# Patient Record
Sex: FEMALE | Race: WHITE | NOT HISPANIC OR LATINO | Employment: OTHER | URBAN - METROPOLITAN AREA
[De-identification: names, ages, dates, MRNs, and addresses within clinical notes are randomized per-mention and may not be internally consistent; named-entity substitution may affect disease eponyms.]

---

## 2023-05-25 ENCOUNTER — HOSPITAL ENCOUNTER (OUTPATIENT)
Dept: DATA CONVERSION | Facility: HOSPITAL | Age: 68
End: 2023-05-25
Attending: ANESTHESIOLOGY | Admitting: ANESTHESIOLOGY
Payer: COMMERCIAL

## 2023-05-25 DIAGNOSIS — Z79.52 LONG TERM (CURRENT) USE OF SYSTEMIC STEROIDS: ICD-10-CM

## 2023-05-25 DIAGNOSIS — M54.50 LOW BACK PAIN, UNSPECIFIED: ICD-10-CM

## 2023-05-25 DIAGNOSIS — Z91.040 LATEX ALLERGY STATUS: ICD-10-CM

## 2023-05-25 DIAGNOSIS — M54.16 RADICULOPATHY, LUMBAR REGION: ICD-10-CM

## 2023-09-07 VITALS — WEIGHT: 169.75 LBS | BODY MASS INDEX: 32.07 KG/M2

## 2023-09-21 ENCOUNTER — HOSPITAL ENCOUNTER (OUTPATIENT)
Dept: DATA CONVERSION | Facility: HOSPITAL | Age: 68
End: 2023-09-21
Attending: ANESTHESIOLOGY | Admitting: ANESTHESIOLOGY
Payer: COMMERCIAL

## 2023-09-21 DIAGNOSIS — M54.16 RADICULOPATHY, LUMBAR REGION: ICD-10-CM

## 2023-09-29 VITALS — WEIGHT: 169.75 LBS | BODY MASS INDEX: 32.07 KG/M2

## 2023-12-11 DIAGNOSIS — I10 PRIMARY HYPERTENSION: ICD-10-CM

## 2023-12-11 RX ORDER — CHLORTHALIDONE 25 MG/1
25 TABLET ORAL DAILY
Qty: 100 TABLET | Refills: 2 | Status: SHIPPED | OUTPATIENT
Start: 2023-12-11

## 2023-12-22 ENCOUNTER — OFFICE VISIT (OUTPATIENT)
Dept: PAIN MEDICINE | Facility: CLINIC | Age: 68
End: 2023-12-22
Payer: MEDICARE

## 2023-12-22 VITALS
HEART RATE: 66 BPM | WEIGHT: 169 LBS | TEMPERATURE: 97 F | HEIGHT: 61 IN | SYSTOLIC BLOOD PRESSURE: 148 MMHG | BODY MASS INDEX: 31.91 KG/M2 | DIASTOLIC BLOOD PRESSURE: 86 MMHG | RESPIRATION RATE: 15 BRPM

## 2023-12-22 DIAGNOSIS — M51.26 LUMBAR DISC HERNIATION: Primary | ICD-10-CM

## 2023-12-22 DIAGNOSIS — M48.061 LUMBAR FORAMINAL STENOSIS: ICD-10-CM

## 2023-12-22 DIAGNOSIS — M47.817 LUMBOSACRAL SPONDYLOSIS WITHOUT MYELOPATHY: ICD-10-CM

## 2023-12-22 DIAGNOSIS — M54.16 LUMBAR NEURITIS: ICD-10-CM

## 2023-12-22 PROCEDURE — 99214 OFFICE O/P EST MOD 30 MIN: CPT | Performed by: ANESTHESIOLOGY

## 2023-12-22 RX ORDER — TIZANIDINE 4 MG/1
TABLET ORAL 3 TIMES DAILY PRN
COMMUNITY

## 2023-12-22 RX ORDER — PREDNISONE 5 MG/1
5 TABLET ORAL
COMMUNITY
Start: 2023-12-06

## 2023-12-22 RX ORDER — DULOXETIN HYDROCHLORIDE 60 MG/1
60 CAPSULE, DELAYED RELEASE ORAL
COMMUNITY
Start: 2016-06-07 | End: 2024-09-05

## 2023-12-22 RX ORDER — DICLOFENAC SODIUM 50 MG/1
1 TABLET, DELAYED RELEASE ORAL 2 TIMES DAILY PRN
COMMUNITY
Start: 2023-12-06

## 2023-12-22 RX ORDER — ARIPIPRAZOLE 2 MG/1
1 TABLET ORAL
COMMUNITY
Start: 2022-05-02

## 2023-12-22 RX ORDER — PREDNISONE 1 MG/1
2 TABLET ORAL
COMMUNITY
Start: 2023-12-06

## 2023-12-22 RX ORDER — DULOXETIN HYDROCHLORIDE 30 MG/1
CAPSULE, DELAYED RELEASE ORAL
COMMUNITY
Start: 2018-04-10

## 2023-12-22 RX ORDER — BUPROPION HCL 300 MG
1 TABLET, EXTENDED RELEASE 24 HR ORAL DAILY
COMMUNITY
Start: 2023-03-27

## 2023-12-22 RX ORDER — GABAPENTIN 300 MG/1
300 CAPSULE ORAL 3 TIMES DAILY
COMMUNITY
Start: 2023-09-08

## 2023-12-22 RX ORDER — TRAZODONE HYDROCHLORIDE 50 MG/1
TABLET ORAL
COMMUNITY
Start: 2018-04-18

## 2023-12-22 SDOH — ECONOMIC STABILITY: FOOD INSECURITY: WITHIN THE PAST 12 MONTHS, THE FOOD YOU BOUGHT JUST DIDN'T LAST AND YOU DIDN'T HAVE MONEY TO GET MORE.: NEVER TRUE

## 2023-12-22 SDOH — ECONOMIC STABILITY: FOOD INSECURITY: WITHIN THE PAST 12 MONTHS, YOU WORRIED THAT YOUR FOOD WOULD RUN OUT BEFORE YOU GOT MONEY TO BUY MORE.: NEVER TRUE

## 2023-12-22 ASSESSMENT — COLUMBIA-SUICIDE SEVERITY RATING SCALE - C-SSRS
1. IN THE PAST MONTH, HAVE YOU WISHED YOU WERE DEAD OR WISHED YOU COULD GO TO SLEEP AND NOT WAKE UP?: NO
2. HAVE YOU ACTUALLY HAD ANY THOUGHTS OF KILLING YOURSELF?: NO
6. HAVE YOU EVER DONE ANYTHING, STARTED TO DO ANYTHING, OR PREPARED TO DO ANYTHING TO END YOUR LIFE?: NO

## 2023-12-22 ASSESSMENT — LIFESTYLE VARIABLES
HOW OFTEN DO YOU HAVE SIX OR MORE DRINKS ON ONE OCCASION: NEVER
AUDIT-C TOTAL SCORE: 0
SKIP TO QUESTIONS 9-10: 1
HOW MANY STANDARD DRINKS CONTAINING ALCOHOL DO YOU HAVE ON A TYPICAL DAY: PATIENT DOES NOT DRINK
HOW OFTEN DO YOU HAVE A DRINK CONTAINING ALCOHOL: NEVER

## 2023-12-22 ASSESSMENT — PATIENT HEALTH QUESTIONNAIRE - PHQ9
2. FEELING DOWN, DEPRESSED OR HOPELESS: SEVERAL DAYS
SUM OF ALL RESPONSES TO PHQ9 QUESTIONS 1 AND 2: 2
1. LITTLE INTEREST OR PLEASURE IN DOING THINGS: SEVERAL DAYS

## 2023-12-22 ASSESSMENT — PAIN SCALES - GENERAL: PAINLEVEL: 3

## 2023-12-22 NOTE — PROGRESS NOTES
History Of Present Illness  Jahaira Moore is a 68 y.o. female presenting with   Chief Complaint   Patient presents with    Back Pain       Patient who returns for chronic low back pain to the LEFT GROIN and LEFT HIP AREA. She continues to report improved chronic neck pain.     She also has low back pain that does RADIATE INTO HER LEFT buttock and GROIN AREA. The pain WAS in her leg and has since resolved with her injections. She reports her back pain got worse in December of 2020 after a busy holiday work schedule at [employer].      The pain is constant, worse standing or with head tilting activity and better with rest. The pain is sharp, nagging and shooting to the LUE. Denies LE paresthesias, weakness, saddle anesthesia, bowel or bladder incontinence. To manage this pain the patient has attempted over 6 weeks of physical therapy at [facility] in November/December of 2020.      The patients chronic depression on Cymbalta and Wellbutrin and Trazodone. She is on oral daily prednisone from her Rheumatologist.      PAIN SCORE: 3-8 /10.     PSHx  -Hysterectomy  -Right tympanic membrane repair  -Bilateral TKA     SocHx  -Retired occupation/ massotherapy  -Positive HX OF Tob 1 PPW QUIT AS OF DEC 2020.   -Occ EtoH 4-5 glasses of wine a week.      PCP: Dr. Rupesh Campbell   Ref: Neuro  Rheum: Dr. Edgard Patino / Dr. Haydee Wade        Past Medical History  She has a past medical history of Other specified disorders of adrenal gland (CMS/[facility]) (06/25/2018), Personal history of other diseases of the respiratory system, Personal history of other diseases of the respiratory system, Personal history of other infectious and parasitic diseases, Personal history of other mental and behavioral disorders, Personal history of other mental and behavioral disorders, Personal history of other specified conditions, Personal history of other specified conditions, Syncope and collapse, and Unspecified osteoarthritis, unspecified site.    Surgical  History  She has a past surgical history that includes Hysterectomy (11/28/2015); Other surgical history (11/28/2015); and Knee surgery (05/30/2018).     Social History  She reports that she has been smoking cigarettes. She has been smoking an average of .25 packs per day. She has never used smokeless tobacco. She reports that she does not drink alcohol and does not use drugs.    Family History  No family history on file.     Allergies  Latex    Review of Systems    All other systems reviewed and negative for any deficits. Pertinent positives and negatives were considered in the medical decision making process.        Physical Exam  /86   Pulse 66   Temp 36.1 °C (97 °F)   Resp 15   Wt 76.7 kg (169 lb)   General: Pt appears stated age     Eyes: Conjunctiva non-icteric and lids without obvious rash or drooping. Pupils are symmetric     ENT: External ears are without deformity or rash. Hearing is grossly intact     Neck: No JVD noted, tracheal position midline.      ---pain on tilting her head      Musculoskeletal: Gait is grossly normal     Digits/nails show no clubbing or cyanosis     Exam of muscles/joints/bones shows no gross atrophy and no abnormal/involuntary movements in the head/neckNo asymmetry or masses noted in the head/neck     Stability: no subluxation noted on movement of bilateral upper extremities or head/neck     Strength: 4+/5 in B/L upper extremities , 4/5 in bilateral LE      Range of Motion: WNL      Sensation: In tact      Cranial nerves 2-12 are grossly intact     Psychiatric: Pt is alert and oriented to time, place and person.       Assessment/Plan   1. Lumbar disc herniation        2. Lumbar neuritis        3. Lumbar foraminal stenosis        4. Lumbosacral spondylosis without myelopathy               Diagnoses/Problems     · Lumbosacral neuritis (724.4) (M54.17)   · Chronic low back pain (724.2,338.29) (M54.50,G89.29)   · Lumbar disc herniation (722.10) (M51.26)   · Lumbar neuritis  (724.4) (M54.16)   · Lumbar spondylosis (721.3) (M47.816)   · Lumbar stenosis (724.02) (M48.061)     Provider Impressions     1. I have previously provided the patient with a list of physical therapy exercises to learn and perform.     She does her stretches twice every day.      We also discussed leg lefts to maintain her muscle mass.      She has continued on over 6 weeks of home therapy exercises with no improvement in her low back pain. She has also failed over 6 weeks of Cymbalta and OTC NSAIDs including Voltaren and Tizanidine.      2. I again reviewed the patients MRI (2-4-2021) findings in detail, including review of the actual images and provided a detailed explanation of the findings using a spine model.      There are disc herniations at L3/4 and L4/5 with foraminal stenosis and a grade I anterolisthesis at both of these levels.      3. I previously reviewed the patients X-ray (2021) findings in detail, including review of the actual images and provided a detailed explanation of the findings using a spine model.      There is a grade I spondylolisthesis of L4 on L5, There is extensive degenerative changes at the L1/2 and L2/3 level. There is mild scoliosis centered at L3.      There was also incidental notation of a thoracic disc herniation at T11/12      4. I would recommend the pt CONTINUE on CYMBALTA to help with nerve related pain. We discussed the risks, benefits, and side effects to this medication including the mechanism of action and the pt understands and agrees.     5. I reviewed the patients cervical x-ray findings in detail, including review of the actual images and provided a detailed explanation of the findings using a spine model. There is grade I spondylolisthesis of the cervical spine at C3/4 and C5/6, multilevel cervical spondylosis and cervical foraminal stenosis.      Given the foraminal stenosis on x-ray I would recommend a Cervical MRI. There is spondylolisthesis and multilevel  degenerative changes. She has also failed PT and oral Cymbalta for over 6 weeks.      6. The patient is a candidate for an DIXON at C7/T1 versus Cervical facet to treat back and radicular pain. I spent time with the patient discussing all of the risks, benefits, and alternatives to this measure. Including but not limited to spinal infection, epidural hematoma/abscess, paralysis, nerve injury, steroid effects, and spinal headache. The patient understands and agrees to proceed as needed.      7. She quit tobacco as of December 2020 based on our advice.      8. The patient is a candidate for an LESI at L3/4 vs L5/S1 vs T11/12 TESI to treat back and radicular pain. I spent time with the patient discussing all of the risks, benefits, and alternatives to this measure. Including but not limited to spinal infection, epidural hematoma/abscess, paralysis, nerve injury, steroid effects, and spinal headache. The patient understands and agrees to proceed.     Her last lumbar injection was done on 9- (L5/S1 LESI BEST ONE YET)  5- and she reported 80% relief lasting for several weeks, however, her pain has since returned but is more towards her groin area. She reports she was able to stand and walk with less pain, however, since it has returned being active is very painful for her.      9. Recall: The pt did report left hip pain I did previously order bilateral hip joint x-rays done on 4-2-2022 and it showed mild bilateral OA.      I spent time with the patient reviewing their imaging and discussing the risks benefits and alternatives to the above plan. A total of 30 minutes was spent reviewing the data and greater than 50% of that time was with the patient during the face to face encounter discussing treatment options both surgical, non-surgical, and minimally invasive techniques.          Mario Odonnell MD

## 2024-02-05 ENCOUNTER — TELEPHONE (OUTPATIENT)
Dept: PAIN MEDICINE | Facility: CLINIC | Age: 69
End: 2024-02-05
Payer: COMMERCIAL

## 2024-02-05 DIAGNOSIS — M54.16 LUMBAR NEURITIS: Primary | ICD-10-CM

## 2024-02-07 ENCOUNTER — TELEPHONE (OUTPATIENT)
Dept: PAIN MEDICINE | Facility: CLINIC | Age: 69
End: 2024-02-07
Payer: COMMERCIAL

## 2024-02-12 ENCOUNTER — APPOINTMENT (OUTPATIENT)
Dept: PAIN MEDICINE | Facility: CLINIC | Age: 69
End: 2024-02-12
Payer: COMMERCIAL

## 2024-02-22 ENCOUNTER — HOSPITAL ENCOUNTER (OUTPATIENT)
Dept: RADIOLOGY | Facility: CLINIC | Age: 69
Discharge: HOME | End: 2024-02-22
Payer: MEDICARE

## 2024-02-22 ENCOUNTER — HOSPITAL ENCOUNTER (OUTPATIENT)
Dept: PAIN MEDICINE | Facility: CLINIC | Age: 69
Discharge: HOME | End: 2024-02-22
Payer: MEDICARE

## 2024-02-22 VITALS
DIASTOLIC BLOOD PRESSURE: 85 MMHG | SYSTOLIC BLOOD PRESSURE: 142 MMHG | RESPIRATION RATE: 18 BRPM | HEART RATE: 65 BPM | TEMPERATURE: 98.2 F | OXYGEN SATURATION: 96 %

## 2024-02-22 DIAGNOSIS — M54.16 LUMBAR NEURITIS: ICD-10-CM

## 2024-02-22 PROCEDURE — 62323 NJX INTERLAMINAR LMBR/SAC: CPT

## 2024-02-22 PROCEDURE — 2500000005 HC RX 250 GENERAL PHARMACY W/O HCPCS

## 2024-02-22 PROCEDURE — 2500000004 HC RX 250 GENERAL PHARMACY W/ HCPCS (ALT 636 FOR OP/ED)

## 2024-02-22 PROCEDURE — A4216 STERILE WATER/SALINE, 10 ML: HCPCS

## 2024-02-22 RX ORDER — SODIUM CHLORIDE 9 MG/ML
INJECTION, SOLUTION INTRAMUSCULAR; INTRAVENOUS; SUBCUTANEOUS
Status: COMPLETED
Start: 2024-02-22 | End: 2024-02-22

## 2024-02-22 RX ORDER — PREDNISONE 10 MG/1
10 TABLET ORAL DAILY
COMMUNITY

## 2024-02-22 RX ORDER — LIDOCAINE HYDROCHLORIDE 5 MG/ML
INJECTION, SOLUTION INFILTRATION; INTRAVENOUS
Status: COMPLETED
Start: 2024-02-22 | End: 2024-02-22

## 2024-02-22 RX ORDER — TRIAMCINOLONE ACETONIDE 40 MG/ML
INJECTION, SUSPENSION INTRA-ARTICULAR; INTRAMUSCULAR
Status: COMPLETED
Start: 2024-02-22 | End: 2024-02-22

## 2024-02-22 RX ADMIN — LIDOCAINE HYDROCHLORIDE 250 MG: 5 INJECTION, SOLUTION INFILTRATION at 16:03

## 2024-02-22 RX ADMIN — TRIAMCINOLONE ACETONIDE 40 MG: 40 INJECTION, SUSPENSION INTRA-ARTICULAR; INTRAMUSCULAR at 16:02

## 2024-02-22 RX ADMIN — SODIUM CHLORIDE 10 ML: 9 INJECTION, SOLUTION INTRAMUSCULAR; INTRAVENOUS; SUBCUTANEOUS at 16:02

## 2024-02-22 ASSESSMENT — ENCOUNTER SYMPTOMS
CHILLS: 0
NUMBNESS: 1
DIARRHEA: 0
WHEEZING: 0
CONSTIPATION: 0
SHORTNESS OF BREATH: 0
SEIZURES: 0
ARTHRALGIAS: 1
DIFFICULTY URINATING: 0
DIZZINESS: 0
DEPRESSION: 0
CHEST TIGHTNESS: 0
FEVER: 0
BLOOD IN STOOL: 0
EYE DISCHARGE: 0
LOSS OF SENSATION IN FEET: 0
WEAKNESS: 0
NECK STIFFNESS: 0
SPEECH DIFFICULTY: 0
VOMITING: 0
NECK PAIN: 0
COUGH: 0
BACK PAIN: 1
DIAPHORESIS: 0
OCCASIONAL FEELINGS OF UNSTEADINESS: 1
NAUSEA: 0

## 2024-02-22 ASSESSMENT — PATIENT HEALTH QUESTIONNAIRE - PHQ9
SUM OF ALL RESPONSES TO PHQ9 QUESTIONS 1 AND 2: 0
1. LITTLE INTEREST OR PLEASURE IN DOING THINGS: NOT AT ALL
2. FEELING DOWN, DEPRESSED OR HOPELESS: NOT AT ALL

## 2024-02-22 ASSESSMENT — PAIN SCALES - GENERAL
PAINLEVEL_OUTOF10: 0 - NO PAIN
PAINLEVEL_OUTOF10: 10 - WORST POSSIBLE PAIN

## 2024-02-22 ASSESSMENT — PAIN DESCRIPTION - DESCRIPTORS: DESCRIPTORS: ACHING;SORE;SHARP

## 2024-02-22 ASSESSMENT — PAIN - FUNCTIONAL ASSESSMENT: PAIN_FUNCTIONAL_ASSESSMENT: 0-10

## 2024-02-22 NOTE — H&P
History Of Present Illness  Jahaira Moore is a 68 y.o. female presenting with chronic low back pain.     Past Medical History  Past Medical History:   Diagnosis Date    Other specified disorders of adrenal gland (CMS/HCC) 06/25/2018    Adrenal nodule    Personal history of other diseases of the respiratory system     History of bronchitis    Personal history of other diseases of the respiratory system     History of pharyngitis    Personal history of other infectious and parasitic diseases     History of viral infection    Personal history of other mental and behavioral disorders     History of depression    Personal history of other mental and behavioral disorders     History of depression    Personal history of other specified conditions     History of diarrhea    Personal history of other specified conditions     History of abdominal pain    Syncope and collapse     Vasovagal syncope    Unspecified osteoarthritis, unspecified site     Osteoarthritis       Surgical History  Past Surgical History:   Procedure Laterality Date    HYSTERECTOMY  11/28/2015    Hysterectomy    KNEE SURGERY  05/30/2018    Knee Surgery    OTHER SURGICAL HISTORY  11/28/2015    Tympanic Membrane Repair        Social History  She reports that she has been smoking cigarettes. She has been smoking an average of .25 packs per day. She has never used smokeless tobacco. She reports that she does not drink alcohol and does not use drugs.    Family History  No family history on file.     Allergies  Latex    Review of Systems   Constitutional:  Negative for chills, diaphoresis and fever.   HENT:  Negative for ear discharge and tinnitus.    Eyes:  Negative for discharge.   Respiratory:  Negative for cough, chest tightness, shortness of breath and wheezing.    Cardiovascular:  Negative for chest pain.   Gastrointestinal:  Negative for blood in stool, constipation, diarrhea, nausea and vomiting.   Endocrine: Negative for polyuria.   Genitourinary:  Negative  for difficulty urinating.   Musculoskeletal:  Positive for arthralgias, back pain and gait problem. Negative for neck pain and neck stiffness.   Skin:  Negative for rash.   Neurological:  Positive for numbness. Negative for dizziness, seizures, speech difficulty and weakness.        Physical Exam  Constitutional:       Appearance: Normal appearance.   HENT:      Head: Normocephalic.      Nose: Nose normal.      Mouth/Throat:      Mouth: Mucous membranes are moist.      Pharynx: Oropharynx is clear.   Eyes:      Extraocular Movements: Extraocular movements intact.      Conjunctiva/sclera: Conjunctivae normal.      Pupils: Pupils are equal, round, and reactive to light.   Cardiovascular:      Rate and Rhythm: Normal rate.   Pulmonary:      Effort: Pulmonary effort is normal. No respiratory distress.      Breath sounds: No wheezing.   Chest:      Chest wall: No tenderness.   Abdominal:      Palpations: Abdomen is soft.   Musculoskeletal:      Cervical back: No rigidity.   Skin:     General: Skin is warm and dry.      Findings: No rash.   Neurological:      Mental Status: She is alert and oriented to person, place, and time.      Sensory: Sensory deficit present.      Motor: Weakness present.      Gait: Gait abnormal.   Psychiatric:         Mood and Affect: Mood normal.         Behavior: Behavior normal.          Last Recorded Vitals  Blood pressure 142/85, pulse 67, temperature 36.8 °C (98.2 °F), temperature source Temporal, resp. rate 16, SpO2 96 %.       Assessment/Plan   1. Lumbar neuritis  Epidural Steroid Injection    Epidural Steroid Injection           Mario Odonnell MD

## 2024-02-22 NOTE — OP NOTE
2/22/2024    Pre procedure Diagnosis: Lumbar neuritis  Post procedure Diagnosis: Lumbar neuritis    Procedure:     1. L5/S1 interlaminar epidural steroid injection   2. Fluoroscopic guidance     Complications: None    Assistants: None     EBL: None    PROCEDURE: The patient was identified in the preoperative area. After risks and benefits were explained, informed consent was obtained. The patient was brought back to the operating room and placed in the prone position on the operating table. Standard ASA monitors were applied and monitored throughout the procedure. Their vital signs remained stable throughout the procedure. The patient's lumbosacral spine was prepped and draped in usual sterile fashion. Using fluoroscopic guidance the skin overlying the trajectory to the L5/S1 space was anesthetized with a total of 5 ml of 0.5% Lidocaine. Thereafter, a 3.5 in long 20G Touhy needle was advanced through the anesthitized skin. Then, the needle was advanced into the L5/S1 ligamentum flavum under multiplanar fluoroscopic guidance.  Then using a loss of resistance syringe and technique the epidural space was accessed.  After negative aspiration for heme, or CSF a total of 4mL of Preservative Free Normal Saline and 40 mg of KENALOG was injected. The needle was removed and a sterile dressing was applied. The patient tolerated the procedure well and was transported to PACU in good condition.    PLAN: The pt will follow up in the office in one to two months to report their results with the procedure. Discharge instructions were reviewed in recovery and provided to the patient in writing. They were advised to call should they have any questions or concerns.

## 2024-05-22 ENCOUNTER — OFFICE VISIT (OUTPATIENT)
Dept: PAIN MEDICINE | Facility: CLINIC | Age: 69
End: 2024-05-22
Payer: MEDICARE

## 2024-05-22 VITALS
DIASTOLIC BLOOD PRESSURE: 86 MMHG | TEMPERATURE: 97.9 F | WEIGHT: 169 LBS | SYSTOLIC BLOOD PRESSURE: 130 MMHG | BODY MASS INDEX: 31.93 KG/M2 | HEART RATE: 76 BPM | OXYGEN SATURATION: 98 % | RESPIRATION RATE: 15 BRPM

## 2024-05-22 DIAGNOSIS — M54.50 CHRONIC LOW BACK PAIN, UNSPECIFIED BACK PAIN LATERALITY, UNSPECIFIED WHETHER SCIATICA PRESENT: ICD-10-CM

## 2024-05-22 DIAGNOSIS — M54.16 LUMBAR NEURITIS: Primary | ICD-10-CM

## 2024-05-22 DIAGNOSIS — M51.26 LUMBAR DISC HERNIATION: ICD-10-CM

## 2024-05-22 DIAGNOSIS — G89.29 CHRONIC LOW BACK PAIN, UNSPECIFIED BACK PAIN LATERALITY, UNSPECIFIED WHETHER SCIATICA PRESENT: ICD-10-CM

## 2024-05-22 DIAGNOSIS — M48.061 LUMBAR FORAMINAL STENOSIS: ICD-10-CM

## 2024-05-22 DIAGNOSIS — M47.816 LUMBAR SPONDYLOSIS: ICD-10-CM

## 2024-05-22 DIAGNOSIS — M48.062 SPINAL STENOSIS OF LUMBAR REGION WITH NEUROGENIC CLAUDICATION: ICD-10-CM

## 2024-05-22 PROCEDURE — 99214 OFFICE O/P EST MOD 30 MIN: CPT | Performed by: ANESTHESIOLOGY

## 2024-05-22 ASSESSMENT — ENCOUNTER SYMPTOMS
OCCASIONAL FEELINGS OF UNSTEADINESS: 1
LOSS OF SENSATION IN FEET: 0

## 2024-05-22 ASSESSMENT — PAIN SCALES - GENERAL: PAINLEVEL: 7

## 2024-05-22 NOTE — PROGRESS NOTES
History Of Present Illness  Jahaira Moore is a 69 y.o. female presenting with   Chief Complaint   Patient presents with    Follow-up       Patient who returns for chronic low back pain to the LEFT GROIN and LEFT HIP AREA. She continues to report improved chronic neck pain.     She also has low back pain that does RADIATE INTO HER LEFT buttock and GROIN AREA. The pain WAS in her leg and has since resolved with her injections. She reports her back pain got worse in December of 2020 after a busy holiday work schedule at [employer].      The pain is constant, worse standing or with head tilting activity and better with rest. The pain is sharp, nagging and shooting to the LUE. Denies LE paresthesias, weakness, saddle anesthesia, bowel or bladder incontinence. To manage this pain the patient has attempted over 6 weeks of physical therapy at [facility] in November/December of 2020.      The patients chronic depression on Cymbalta and Wellbutrin and Trazodone. She is on oral daily prednisone from her Rheumatologist.      PAIN SCORE: 3-8 /10.     PSHx  -Hysterectomy  -Right tympanic membrane repair  -Bilateral TKA     SocHx  -Retired occupation/ massotherapy  -Positive HX OF Tob 1 PPW QUIT AS OF DEC 2020.   -Occ EtoH 4-5 glasses of wine a week.      PCP: Dr. Rupesh Campbell   Ref: Neuro  Rheum: Dr. Edgard Patino / Dr. Haydee Wade        Past Medical History  She has a past medical history of Other specified disorders of adrenal gland ([facility]) (06/25/2018), Personal history of other diseases of the respiratory system, Personal history of other diseases of the respiratory system, Personal history of other infectious and parasitic diseases, Personal history of other mental and behavioral disorders, Personal history of other mental and behavioral disorders, Personal history of other specified conditions, Personal history of other specified conditions, Syncope and collapse, and Unspecified osteoarthritis, unspecified site.    Surgical  History  She has a past surgical history that includes Hysterectomy (11/28/2015); Other surgical history (11/28/2015); and Knee surgery (05/30/2018).     Social History  She reports that she has been smoking cigarettes. She has never used smokeless tobacco. She reports that she does not drink alcohol and does not use drugs.    Family History  No family history on file.     Allergies  Latex    Review of Systems    All other systems reviewed and negative for any deficits. Pertinent positives and negatives were considered in the medical decision making process.        Physical Exam  /86   Pulse 76   Temp 36.6 °C (97.9 °F)   Resp 15   Wt 76.7 kg (169 lb)   SpO2 98%   General: Pt appears stated age     Eyes: Conjunctiva non-icteric and lids without obvious rash or drooping. Pupils are symmetric     ENT: External ears are without deformity or rash. Hearing is grossly intact     Neck: No JVD noted, tracheal position midline.      ---pain on tilting her head      Musculoskeletal: Gait is grossly normal     Digits/nails show no clubbing or cyanosis     Exam of muscles/joints/bones shows no gross atrophy and no abnormal/involuntary movements in the head/neckNo asymmetry or masses noted in the head/neck     Stability: no subluxation noted on movement of bilateral upper extremities or head/neck     Strength: 4+/5 in B/L upper extremities , 4/5 in bilateral LE      Range of Motion: WNL      Sensation: In tact      Cranial nerves 2-12 are grossly intact     Psychiatric: Pt is alert and oriented to time, place and person.       Assessment/Plan   1. Lumbar neuritis        2. Lumbar disc herniation        3. Lumbar foraminal stenosis        4. Chronic low back pain, unspecified back pain laterality, unspecified whether sciatica present        5. Spinal stenosis of lumbar region with neurogenic claudication        6. Lumbar spondylosis             Diagnoses/Problems   · Lumbosacral neuritis (724.4) (M54.17)   · Chronic low  back pain (724.2,338.29) (M54.50,G89.29)   · Lumbar disc herniation (722.10) (M51.26)   · Lumbar neuritis (724.4) (M54.16)   · Lumbar spondylosis (721.3) (M47.816)   · Lumbar stenosis (724.02) (M48.061)     Provider Impressions     1. I have previously provided the patient with a list of physical therapy exercises to learn and perform.     She does her stretches twice every day.      We also discussed leg lefts to maintain her muscle mass.      She has continued on over 6 weeks of home therapy exercises with no improvement in her low back pain. She has also failed over 6 weeks of Cymbalta and OTC NSAIDs including Voltaren and Tizanidine.      2. I again reviewed the patients MRI (2-4-2021) findings in detail, including review of the actual images and provided a detailed explanation of the findings using a spine model.      There are disc herniations at L3/4 and L4/5 with foraminal stenosis and a grade I anterolisthesis at both of these levels.      3. I previously reviewed the patients X-ray (2021) findings in detail, including review of the actual images and provided a detailed explanation of the findings using a spine model.      There is a grade I spondylolisthesis of L4 on L5, There is extensive degenerative changes at the L1/2 and L2/3 level. There is mild scoliosis centered at L3.      There was also incidental notation of a thoracic disc herniation at T11/12      4. I would recommend the pt CONTINUE on CYMBALTA to help with nerve related pain. We discussed the risks, benefits, and side effects to this medication including the mechanism of action and the pt understands and agrees.     5. I reviewed the patients cervical x-ray findings in detail, including review of the actual images and provided a detailed explanation of the findings using a spine model. There is grade I spondylolisthesis of the cervical spine at C3/4 and C5/6, multilevel cervical spondylosis and cervical foraminal stenosis.      Given the  foraminal stenosis on x-ray I would recommend a Cervical MRI. There is spondylolisthesis and multilevel degenerative changes. She has also failed PT and oral Cymbalta for over 6 weeks.      6. The patient is a candidate for an DIXON at C7/T1 versus Cervical facet to treat back and radicular pain. I spent time with the patient discussing all of the risks, benefits, and alternatives to this measure. Including but not limited to spinal infection, epidural hematoma/abscess, paralysis, nerve injury, steroid effects, and spinal headache. The patient understands and agrees to proceed as needed.      7. She quit tobacco as of December 2020 based on our advice.      8. The patient is a candidate for an LESI at L3/4 vs L5/S1 vs T11/12 TESI to treat back and radicular pain. I spent time with the patient discussing all of the risks, benefits, and alternatives to this measure. Including but not limited to spinal infection, epidural hematoma/abscess, paralysis, nerve injury, steroid effects, and spinal headache. The patient understands and agrees to proceed.     Her last lumbar injection was done on 2-, 9- (L5/S1 LESI BEST ONE YET)  5- and she reported 50-80% relief lasting for several weeks, however, her pain has slowly started returned but is more towards her groin area. She reports she was able to stand and walk with less pain, however, since it has returned being active is very painful for her.      9. Recall: The pt did report left hip pain I did previously order bilateral hip joint x-rays done on 4-2-2022 and it showed mild bilateral OA.      I spent time with the patient reviewing their imaging and discussing the risks benefits and alternatives to the above plan. A total of 30 minutes was spent reviewing the data and greater than 50% of that time was with the patient during the face to face encounter discussing treatment options both surgical, non-surgical, and minimally invasive techniques.         Mario Odonnell MD

## 2024-05-22 NOTE — PROGRESS NOTES
Low left back pain into the buttox and hip and sometimes into the groin.  Denies Back and neck surgery

## 2024-07-18 ENCOUNTER — TELEPHONE (OUTPATIENT)
Dept: PAIN MEDICINE | Facility: CLINIC | Age: 69
End: 2024-07-18
Payer: COMMERCIAL

## 2024-07-18 DIAGNOSIS — M54.16 LUMBAR NEURITIS: Primary | ICD-10-CM

## 2024-07-18 NOTE — TELEPHONE ENCOUNTER
HISTORY OF PRESENT ILLNESS  Surya Winslow is a 77 y.o. female who comes in for follow up by Kamila Kimble MD for a thyroid nodule  Thyroid Problem   Pertinent negatives include no chest pain, no abdominal pain, no headaches and no shortness of breath. She had a cold in March 2020 and noted a tender right neck mass. She saw Kamila Kimble MD and had labs and an US. The US demonstrated a 2.2 cm mid thyroid nodule, partially cystic and several other smaller nodules. Biopsy of the larger lesion was recommended. She was euthyroid on labs. She states that the tenderness has resolved and denies dysphagia, dysphonia, neck pressure, pain, palpitations, fever, chills or sweats, prior ionizing radiation exposure, family hx thyroid disease. I did an US FNA 4/1/2020 which was non diagnostic but I had aspirated a thick viscous fluid from the lesion. She had a f/u US at [address] [place] [address] 11/17/2020 demonstrating a 7 x 10 x 5 mm hypoechoic nodule with single punctate echogenic focus that was TI_RADS 5 in the anterolateral inferior aspect of the right lobe. Past Medical History:   Diagnosis Date    Depression     Gastrointestinal disorder     gerd    GERD (gastroesophageal reflux disease)      Past Surgical History:   Procedure Laterality Date    HX CHOLECYSTECTOMY       Family History   Problem Relation Age of Onset    Heart Disease Mother     Cancer Father         lung    Cancer Paternal Aunt         melonoma    Diabetes Paternal Grandmother     Heart Disease Paternal Grandmother      Social History     Tobacco Use    Smoking status: Never Smoker    Smokeless tobacco: Never Used   Substance Use Topics    Alcohol use: No     Frequency: Never    Drug use: No     Current Outpatient Medications   Medication Sig    azelastine (ASTELIN) 137 mcg (0.1 %) nasal spray USE 2 SPRAYS IN EACH NOSTRIL TWICE A DAY    MAGNESIUM PO Take  by mouth.     pseudoephedrine CR (Sinus 12 Hour) 120 mg CR Orders placed for an L5/S1 LESI please call to schedule.  tablet Take 120 mg by mouth two (2) times daily as needed for Congestion.  acetaminophen/diphenhydramine (TYLENOL PM PO) Take  by mouth.  OTHER     lansoprazole (PREVACID) 15 mg capsule Take  by mouth Daily (before breakfast).  ciprofloxacin (CIPRO) 250 mg tablet Take 500 mg by mouth as needed. As needed for frequent bladder infections.  ascorbic acid (VITAMIN C) 250 mg tablet Take  by mouth daily.  Cholecalciferol, Vitamin D3, (VITAMIN D) 1,000 unit Cap Take  by mouth daily.  timolol (TIMOPTIC) 0.5 % ophthalmic solution timolol maleate 0.5 % eye drops    PROGESTERONE Take 150 mg by mouth daily.  fexofenadine-pseudoephedrine (ALLEGRA-D 12 HOUR)  mg Tb12 Take 1 Tab by mouth every twelve (12) hours.  fluticasone propionate (FLONASE NA) by Nasal route.  phenazopy/facial-body wipes (PHENAZOPYRIDINE) 99.5 mg by Does Not Apply route as needed.  trimethoprim-sulfamethoxazole (BACTRIM DS) 160-800 mg per tablet Take 1 Tab by mouth two (2) times a day.  latanoprost (XALATAN) 0.005 % ophthalmic solution Administer 1 Drop to both eyes nightly.  ketorolac (TORADOL) 10 mg tablet Take 1 Tab by mouth every six (6) hours as needed for Pain. (Patient not taking: Reported on 11/24/2020)     No current facility-administered medications for this visit. Allergies   Allergen Reactions    Macrodantin [Nitrofurantoin Macrocrystalline] Anaphylaxis    Pcn [Penicillins] Anaphylaxis    Tetracycline Nausea and Vomiting       Review of Systems   Constitutional: Positive for chills and malaise/fatigue. Negative for diaphoresis, fever and weight loss. HENT: Negative for sore throat. Eyes: Negative for blurred vision and discharge. Respiratory: Negative for cough, shortness of breath and wheezing. Cardiovascular: Negative for chest pain, palpitations, orthopnea, claudication and leg swelling. Gastrointestinal: Positive for constipation and heartburn.  Negative for abdominal pain, diarrhea, melena, nausea and vomiting. Genitourinary: Negative for dysuria, flank pain, frequency and hematuria. Musculoskeletal: Positive for myalgias. Negative for back pain, joint pain and neck pain. Skin: Negative for rash. Neurological: Positive for dizziness. Negative for speech change, focal weakness, seizures, loss of consciousness, weakness and headaches. Endo/Heme/Allergies: Does not bruise/bleed easily. Psychiatric/Behavioral: Positive for depression. Negative for memory loss. Visit Vitals  /74 (BP 1 Location: Left arm, BP Patient Position: Sitting)   Pulse 72   Temp 97.5 °F (36.4 °C) (Oral)   Resp 16   Ht 5' 1\" (1.549 m)   Wt 55.9 kg (123 lb 4.8 oz)   SpO2 96%   BMI 23.30 kg/m²       Physical Exam  Constitutional:       General: She is not in acute distress. Appearance: She is well-developed. She is not diaphoretic. HENT:      Head: Normocephalic and atraumatic. Mouth/Throat:      Pharynx: No oropharyngeal exudate. Eyes:      General: No scleral icterus. Conjunctiva/sclera: Conjunctivae normal.      Pupils: Pupils are equal, round, and reactive to light. Neck:      Musculoskeletal: Normal range of motion and neck supple. Thyroid: Thyroid mass (barely palpable 1 cm nodule on right) present. No thyromegaly or thyroid tenderness. Vascular: No JVD. Trachea: Trachea and phonation normal. No tracheal deviation. Cardiovascular:      Rate and Rhythm: Normal rate and regular rhythm. Heart sounds: No murmur. No friction rub. No gallop. Pulmonary:      Effort: Pulmonary effort is normal. No respiratory distress. Breath sounds: Normal breath sounds. No wheezing or rales. Abdominal:      General: Bowel sounds are normal. There is no distension. Palpations: Abdomen is soft. There is no mass. Tenderness: There is no abdominal tenderness. There is no guarding or rebound. Musculoskeletal: Normal range of motion.    Lymphadenopathy: Cervical: No cervical adenopathy. Right cervical: No superficial or deep cervical adenopathy. Left cervical: No superficial or deep cervical adenopathy. Skin:     General: Skin is warm and dry. Coloration: Skin is not pale. Findings: No erythema or rash. Neurological:      Mental Status: She is alert and oriented to person, place, and time. Cranial Nerves: No cranial nerve deficit. Psychiatric:         Behavior: Behavior normal.         Thought Content: Thought content normal.         Judgment: Judgment normal.         ASSESSMENT and PLAN  1. Right dominant thyroid nodule now with punctate calcifications. It barely meets criteria for TI-RADS5 . I suspect this to be a multinodular goiter but need to r/o malignancy. I explained about the anatomy and pathophysiology of thyroid nodules/disease. I explained about possible malignancy. I discussed FNA, observation, possible thyroid suppression pending FNA, and total thyroidectomy. Risks of surgery include bleeding (potentially requiring emergent exploration at bedside), infection, parathyroid injury/removal requiring supplemental calcium +/- vit d, recurrent laryngeal/superior laryngeal nerve injury resulting in vocal cord paralysis, voice changes and fatigue, aspiration, further surgery, need for lifelong thyroid supplementation.   2.  Fatigue is ongoing    She preferred moving forward with a  repeat US FNA of the right thyroid nodule and we will arrange that        John Carroll MD FACS

## 2024-07-18 NOTE — TELEPHONE ENCOUNTER
Pt called asking for another inj.   Please enter what you would like her to have and we will get her scheduled.    Thank you

## 2024-07-26 ENCOUNTER — HOSPITAL ENCOUNTER (OUTPATIENT)
Dept: PAIN MEDICINE | Facility: CLINIC | Age: 69
Discharge: HOME | End: 2024-07-26
Payer: MEDICARE

## 2024-07-26 VITALS
WEIGHT: 145 LBS | RESPIRATION RATE: 15 BRPM | OXYGEN SATURATION: 93 % | HEART RATE: 66 BPM | TEMPERATURE: 97.5 F | DIASTOLIC BLOOD PRESSURE: 61 MMHG | HEIGHT: 63 IN | BODY MASS INDEX: 25.69 KG/M2 | SYSTOLIC BLOOD PRESSURE: 124 MMHG

## 2024-07-26 DIAGNOSIS — M54.16 LUMBAR NEURITIS: ICD-10-CM

## 2024-07-26 PROCEDURE — 2500000004 HC RX 250 GENERAL PHARMACY W/ HCPCS (ALT 636 FOR OP/ED): Performed by: ANESTHESIOLOGY

## 2024-07-26 PROCEDURE — 2500000005 HC RX 250 GENERAL PHARMACY W/O HCPCS: Performed by: ANESTHESIOLOGY

## 2024-07-26 RX ORDER — LIDOCAINE HYDROCHLORIDE 5 MG/ML
INJECTION, SOLUTION INFILTRATION; INTRAVENOUS AS NEEDED
Status: COMPLETED | OUTPATIENT
Start: 2024-07-26 | End: 2024-07-26

## 2024-07-26 RX ORDER — SODIUM CHLORIDE 9 MG/ML
INJECTION, SOLUTION INTRAMUSCULAR; INTRAVENOUS; SUBCUTANEOUS AS NEEDED
Status: COMPLETED | OUTPATIENT
Start: 2024-07-26 | End: 2024-07-26

## 2024-07-26 RX ORDER — TRIAMCINOLONE ACETONIDE 40 MG/ML
INJECTION, SUSPENSION INTRA-ARTICULAR; INTRAMUSCULAR AS NEEDED
Status: COMPLETED | OUTPATIENT
Start: 2024-07-26 | End: 2024-07-26

## 2024-07-26 SDOH — ECONOMIC STABILITY: FOOD INSECURITY: WITHIN THE PAST 12 MONTHS, THE FOOD YOU BOUGHT JUST DIDN'T LAST AND YOU DIDN'T HAVE MONEY TO GET MORE.: NEVER TRUE

## 2024-07-26 SDOH — ECONOMIC STABILITY: FOOD INSECURITY: WITHIN THE PAST 12 MONTHS, YOU WORRIED THAT YOUR FOOD WOULD RUN OUT BEFORE YOU GOT MONEY TO BUY MORE.: NEVER TRUE

## 2024-07-26 ASSESSMENT — ENCOUNTER SYMPTOMS
DEPRESSION: 1
LOSS OF SENSATION IN FEET: 1
OCCASIONAL FEELINGS OF UNSTEADINESS: 1

## 2024-07-26 ASSESSMENT — PAIN DESCRIPTION - DESCRIPTORS: DESCRIPTORS: JABBING;SQUEEZING

## 2024-07-26 ASSESSMENT — PATIENT HEALTH QUESTIONNAIRE - PHQ9
SUM OF ALL RESPONSES TO PHQ9 QUESTIONS 1 AND 2: 0
2. FEELING DOWN, DEPRESSED OR HOPELESS: NOT AT ALL
1. LITTLE INTEREST OR PLEASURE IN DOING THINGS: NOT AT ALL

## 2024-07-26 ASSESSMENT — LIFESTYLE VARIABLES
HOW MANY STANDARD DRINKS CONTAINING ALCOHOL DO YOU HAVE ON A TYPICAL DAY: PATIENT DOES NOT DRINK
AUDIT-C TOTAL SCORE: 0
HOW OFTEN DO YOU HAVE SIX OR MORE DRINKS ON ONE OCCASION: NEVER
SKIP TO QUESTIONS 9-10: 1
HOW OFTEN DO YOU HAVE A DRINK CONTAINING ALCOHOL: NEVER

## 2024-07-26 ASSESSMENT — PAIN - FUNCTIONAL ASSESSMENT: PAIN_FUNCTIONAL_ASSESSMENT: 0-10

## 2024-07-26 ASSESSMENT — PAIN SCALES - GENERAL
PAINLEVEL_OUTOF10: 10 - WORST POSSIBLE PAIN
PAINLEVEL_OUTOF10: 7

## 2024-09-05 ENCOUNTER — APPOINTMENT (OUTPATIENT)
Dept: PRIMARY CARE | Facility: CLINIC | Age: 69
End: 2024-09-05
Payer: MEDICARE

## 2024-09-05 VITALS
TEMPERATURE: 96.8 F | OXYGEN SATURATION: 98 % | BODY MASS INDEX: 24.98 KG/M2 | HEIGHT: 63 IN | WEIGHT: 141 LBS | DIASTOLIC BLOOD PRESSURE: 76 MMHG | HEART RATE: 99 BPM | SYSTOLIC BLOOD PRESSURE: 122 MMHG

## 2024-09-05 DIAGNOSIS — Z00.00 ROUTINE GENERAL MEDICAL EXAMINATION AT HEALTH CARE FACILITY: Primary | ICD-10-CM

## 2024-09-05 DIAGNOSIS — Z87.891 PERSONAL HISTORY OF TOBACCO USE, PRESENTING HAZARDS TO HEALTH: ICD-10-CM

## 2024-09-05 DIAGNOSIS — M67.40 GANGLION CYST: ICD-10-CM

## 2024-09-05 DIAGNOSIS — Z13.6 SCREENING FOR CARDIOVASCULAR CONDITION: ICD-10-CM

## 2024-09-05 DIAGNOSIS — Z23 NEED FOR VACCINATION: ICD-10-CM

## 2024-09-05 DIAGNOSIS — F32.2 AGITATED DEPRESSION (MULTI): ICD-10-CM

## 2024-09-05 LAB
CHOLEST SERPL-MCNC: 223 MG/DL (ref 0–199)
CHOLESTEROL/HDL RATIO: 2.6
HDLC SERPL-MCNC: 86.6 MG/DL
LDLC SERPL CALC-MCNC: 114 MG/DL
NON HDL CHOLESTEROL: 136 MG/DL (ref 0–149)
TRIGL SERPL-MCNC: 111 MG/DL (ref 0–149)
VLDL: 22 MG/DL (ref 0–40)

## 2024-09-05 PROCEDURE — 1159F MED LIST DOCD IN RCRD: CPT | Performed by: FAMILY MEDICINE

## 2024-09-05 PROCEDURE — G0444 DEPRESSION SCREEN ANNUAL: HCPCS | Performed by: FAMILY MEDICINE

## 2024-09-05 PROCEDURE — 99397 PER PM REEVAL EST PAT 65+ YR: CPT | Performed by: FAMILY MEDICINE

## 2024-09-05 PROCEDURE — 81001 URINALYSIS AUTO W/SCOPE: CPT

## 2024-09-05 PROCEDURE — G0008 ADMIN INFLUENZA VIRUS VAC: HCPCS | Performed by: FAMILY MEDICINE

## 2024-09-05 PROCEDURE — 1160F RVW MEDS BY RX/DR IN RCRD: CPT | Performed by: FAMILY MEDICINE

## 2024-09-05 PROCEDURE — 90662 IIV NO PRSV INCREASED AG IM: CPT | Performed by: FAMILY MEDICINE

## 2024-09-05 PROCEDURE — 1125F AMNT PAIN NOTED PAIN PRSNT: CPT | Performed by: FAMILY MEDICINE

## 2024-09-05 PROCEDURE — G0439 PPPS, SUBSEQ VISIT: HCPCS | Performed by: FAMILY MEDICINE

## 2024-09-05 PROCEDURE — 80061 LIPID PANEL: CPT

## 2024-09-05 PROCEDURE — 3008F BODY MASS INDEX DOCD: CPT | Performed by: FAMILY MEDICINE

## 2024-09-05 PROCEDURE — 99497 ADVNCD CARE PLAN 30 MIN: CPT | Performed by: FAMILY MEDICINE

## 2024-09-05 PROCEDURE — 1170F FXNL STATUS ASSESSED: CPT | Performed by: FAMILY MEDICINE

## 2024-09-05 ASSESSMENT — ENCOUNTER SYMPTOMS
LOSS OF SENSATION IN FEET: 0
OCCASIONAL FEELINGS OF UNSTEADINESS: 0
DEPRESSION: 0

## 2024-09-05 ASSESSMENT — ACTIVITIES OF DAILY LIVING (ADL)
DRESSING: INDEPENDENT
GROCERY_SHOPPING: INDEPENDENT
MANAGING_FINANCES: INDEPENDENT
DOING_HOUSEWORK: INDEPENDENT
BATHING: INDEPENDENT
TAKING_MEDICATION: INDEPENDENT

## 2024-09-05 ASSESSMENT — PAIN SCALES - GENERAL: PAINLEVEL: 8

## 2024-09-05 ASSESSMENT — PATIENT HEALTH QUESTIONNAIRE - PHQ9
1. LITTLE INTEREST OR PLEASURE IN DOING THINGS: NOT AT ALL
SUM OF ALL RESPONSES TO PHQ9 QUESTIONS 1 AND 2: 0
2. FEELING DOWN, DEPRESSED OR HOPELESS: NOT AT ALL

## 2024-09-05 NOTE — PROGRESS NOTES
"Subjective   Reason for Visit: Jahaira Moore is an 69 y.o. female here for a Medicare Wellness visit.     Past Medical, Surgical, and Family History reviewed and updated in chart.    Reviewed all medications by prescribing practitioner or clinical pharmacist (such as prescriptions, OTCs, herbal therapies and supplements) and documented in the medical record.    HPI  69-year-old female for Medicare wellness visit and complete physical exam.  She is due for lung cancer screening.  She is due for fasting blood work.  She refuses mammograms.  She would like a flu shot  Patient Care Team:  Rupesh BHAKTA DO as PCP - General  Rupesh BHAKTA DO as PCP - [employer] PCP     Review of Systems  Constitutional: no chills, no fever and no night sweats.   Eyes: no blurred vision and no eyesight problems.   ENT: no hearing loss, no nasal congestion, no nasal discharge, no hoarseness and no sore throat.   Cardiovascular: no chest pain, no intermittent leg claudication, no lower extremity edema, no palpitations and no syncope.   Respiratory: no cough, no shortness of breath during exertion, no shortness of breath at rest and no wheezing.   Gastrointestinal: no abdominal pain, no blood in stools, no constipation, no diarrhea, no melena, no nausea, no rectal pain and no vomiting.   Genitourinary: no dysuria, no change in urinary frequency, no urinary hesitancy and no feelings of urinary urgency.   Neurological: no difficulty walking, no headache, no limb weakness, no numbness and no tingling.   Psychiatric: no anxiety, no depression, no anhedonia and no substance use disorders.   Endocrine: no recent weight gain and no recent weight loss.   Hematologic/Lymphatic: no tendency for easy bruising and no swollen glands.  Objective   Vitals:  /76 (BP Location: Left arm, Patient Position: Sitting, BP Cuff Size: Adult)   Pulse 99   Temp 36 °C (96.8 °F) (Temporal)   Ht 1.6 m (5' 3\")   Wt 64 kg (141 lb)   SpO2 98%  "  BMI 24.98 kg/m²       Physical Exam  Constitutional: Alert and in no acute distress. Well developed, well nourished.   Eyes: Pupils were equal in size, round, reactive to light (PERRL) with normal accommodation and extraocular movements intact (EOMI). Ophthalmoscopic examination: Normal.   Ears, Nose, Mouth, and Throat: External inspection of ears and nose: Normal. Otoscopic examination: Normal. Lips, teeth, and gums: Normal. Oropharynx: Normal.   Neck: No neck mass was observed. Supple. Thyroid not enlarged and there were no palpable thyroid nodules.   Cardiovascular: Heart rate and rhythm were normal, normal S1 and S2, no gallops, no murmurs and no pericardial rub. Carotid pulses: Normal with no bruits. Abdominal aorta: Normal. Pedal pulses: Normal. No peripheral edema.   Pulmonary: No respiratory distress. Clear bilateral breath sounds.   Abdomen: Soft nontender; no abdominal mass palpated. Normal bowel sounds. No organomegaly.   Skin: Normal skin color and pigmentation, normal skin turgor, and no rash.   Psychiatric: Judgment and insight: Intact. Mood and affect: Normal.  Assessment & Plan  Personal history of tobacco use, presenting hazards to health    Orders:    CT lung screening low dose; Future    Need for vaccination    Orders:    Flu vaccine, high dose seasonal, preservative free    Routine general medical examination at health care facility    Orders:    1 Year Follow Up In Primary Care - Wellness Exam; Future    Screening for cardiovascular condition    Orders:    Lipid panel; Future    Urinalysis with Reflex Microscopic; Future     Ganglion cyst    Orders:    Referral to Orthopaedic Surgery; Future        Advance Directives Discussion  16 - 20 minutes were spent discussing Advanced Care Planning (including a Living Will, Medical Power Of , as well as specific end of life choices and/or directives). The details of that discussion were documented in Advanced Directives Discussion section of the  medical record.     Depression Screening  10 - 15 minutes were spent screening for depression.

## 2024-09-05 NOTE — ACP (ADVANCE CARE PLANNING)
Confirming Previous Code Status:   Advance Care Planning Note     Discussion Date: 09/05/24   Discussion Participants: patient    The patient wishes to discuss Advance Care Planning today and the following is a brief summary of our discussion.     Patient has capacity to make their own medical decisions: Yes  Health Care Agent/Surrogate Decision Maker documented in chart: Yes    Documents on file and valid:  Advance Directive/Living Will: No   Health Care Power of : No  Other:     Communication of Medical Status/Prognosis:        Communication of Treatment Goals/Options:        Treatment Decisions  Goals of Care: quality of life is prioritized; willing to accept low-burden treatments to achieve meaningful goals     Follow Up Plan    Team Members    Time Statement: Total face to face time spent on advance care planning was 16 minutes with 16 minutes spent in counseling, including the explanation.    Rupesh Campbell DO  9/5/2024 12:00 PM

## 2024-09-06 ENCOUNTER — TELEPHONE (OUTPATIENT)
Dept: PRIMARY CARE | Facility: CLINIC | Age: 69
End: 2024-09-06
Payer: COMMERCIAL

## 2024-09-06 LAB
APPEARANCE UR: ABNORMAL
BILIRUB UR STRIP.AUTO-MCNC: NEGATIVE MG/DL
COLOR UR: YELLOW
GLUCOSE UR STRIP.AUTO-MCNC: NORMAL MG/DL
HYALINE CASTS #/AREA URNS AUTO: ABNORMAL /LPF
KETONES UR STRIP.AUTO-MCNC: ABNORMAL MG/DL
LEUKOCYTE ESTERASE UR QL STRIP.AUTO: ABNORMAL
MUCOUS THREADS #/AREA URNS AUTO: ABNORMAL /LPF
NITRITE UR QL STRIP.AUTO: NEGATIVE
PH UR STRIP.AUTO: 5.5 [PH]
PROT UR STRIP.AUTO-MCNC: ABNORMAL MG/DL
RBC # UR STRIP.AUTO: NEGATIVE /UL
RBC #/AREA URNS AUTO: ABNORMAL /HPF
SP GR UR STRIP.AUTO: 1.03
SQUAMOUS #/AREA URNS AUTO: ABNORMAL /HPF
UROBILINOGEN UR STRIP.AUTO-MCNC: ABNORMAL MG/DL
WBC #/AREA URNS AUTO: ABNORMAL /HPF

## 2024-09-06 NOTE — TELEPHONE ENCOUNTER
Patient  called she was here for appt on 9/5/2024 and had lab done she said her arm is swollen (left arm) pt just wanted to let you know.Pt said she is putting ice on it.

## 2024-09-26 ENCOUNTER — HOSPITAL ENCOUNTER (OUTPATIENT)
Dept: RADIOLOGY | Facility: CLINIC | Age: 69
Discharge: HOME | End: 2024-09-26
Payer: MEDICARE

## 2024-09-26 DIAGNOSIS — Z87.891 PERSONAL HISTORY OF TOBACCO USE, PRESENTING HAZARDS TO HEALTH: ICD-10-CM

## 2024-09-26 PROCEDURE — 71271 CT THORAX LUNG CANCER SCR C-: CPT | Performed by: RADIOLOGY

## 2024-09-26 PROCEDURE — 71271 CT THORAX LUNG CANCER SCR C-: CPT

## 2024-10-08 ENCOUNTER — APPOINTMENT (OUTPATIENT)
Dept: PRIMARY CARE | Facility: CLINIC | Age: 69
End: 2024-10-08
Payer: COMMERCIAL

## 2024-10-15 ENCOUNTER — APPOINTMENT (OUTPATIENT)
Dept: CARDIOLOGY | Facility: HOSPITAL | Age: 69
End: 2024-10-15
Payer: MEDICARE

## 2024-10-15 ENCOUNTER — APPOINTMENT (OUTPATIENT)
Dept: RADIOLOGY | Facility: HOSPITAL | Age: 69
End: 2024-10-15
Payer: MEDICARE

## 2024-10-15 ENCOUNTER — HOSPITAL ENCOUNTER (INPATIENT)
Facility: HOSPITAL | Age: 69
LOS: 3 days | Discharge: NURSING, REHAB OR LONG-TERM CARE | End: 2024-10-18
Attending: EMERGENCY MEDICINE | Admitting: INTERNAL MEDICINE
Payer: MEDICARE

## 2024-10-15 DIAGNOSIS — R11.2 NAUSEA AND VOMITING, UNSPECIFIED VOMITING TYPE: ICD-10-CM

## 2024-10-15 DIAGNOSIS — I63.211 CEREBRAL INFARCTION DUE TO OCCLUSION OF RIGHT VERTEBRAL ARTERY (MULTI): Primary | ICD-10-CM

## 2024-10-15 DIAGNOSIS — R09.89 OTHER SPECIFIED SYMPTOMS AND SIGNS INVOLVING THE CIRCULATORY AND RESPIRATORY SYSTEMS: ICD-10-CM

## 2024-10-15 DIAGNOSIS — I67.82 CEREBRAL ISCHEMIA: ICD-10-CM

## 2024-10-15 DIAGNOSIS — R20.2 FACIAL PARESTHESIA: ICD-10-CM
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
## 2024-10-15 DIAGNOSIS — R26.81 GAIT INSTABILITY: ICD-10-CM

## 2024-10-15 DIAGNOSIS — I63.211: ICD-10-CM

## 2024-10-15 DIAGNOSIS — I63.9 CEREBRAL INFARCTION, UNSPECIFIED: ICD-10-CM

## 2024-10-15 LAB
ALBUMIN SERPL BCP-MCNC: 4.6 G/DL (ref 3.4–5)
ALP SERPL-CCNC: 55 U/L (ref 33–136)
ALT SERPL W P-5'-P-CCNC: 12 U/L (ref 7–45)
ANION GAP SERPL CALC-SCNC: 11 MMOL/L (ref 10–20)
APTT PPP: 29 SECONDS (ref 27–38)
AST SERPL W P-5'-P-CCNC: 14 U/L (ref 9–39)
BASOPHILS # BLD AUTO: 0.04 X10*3/UL (ref 0–0.1)
BASOPHILS NFR BLD AUTO: 0.4 %
BILIRUB SERPL-MCNC: 0.6 MG/DL (ref 0–1.2)
BUN SERPL-MCNC: 29 MG/DL (ref 6–23)
CALCIUM SERPL-MCNC: 10.5 MG/DL (ref 8.6–10.3)
CARDIAC TROPONIN I PNL SERPL HS: 12 NG/L (ref 0–13)
CHLORIDE SERPL-SCNC: 99 MMOL/L (ref 98–107)
CHOLEST SERPL-MCNC: 180 MG/DL (ref 0–199)
CHOLESTEROL/HDL RATIO: 2.9
CO2 SERPL-SCNC: 32 MMOL/L (ref 21–32)
CREAT SERPL-MCNC: 1.24 MG/DL (ref 0.5–1.05)
EGFRCR SERPLBLD CKD-EPI 2021: 47 ML/MIN/1.73M*2
EOSINOPHIL # BLD AUTO: 0.11 X10*3/UL (ref 0–0.4)
EOSINOPHIL NFR BLD AUTO: 1 %
ERYTHROCYTE [DISTWIDTH] IN BLOOD BY AUTOMATED COUNT: 12.3 % (ref 11.5–14.5)
GLUCOSE BLD MANUAL STRIP-MCNC: 116 MG/DL (ref 74–99)
GLUCOSE BLD MANUAL STRIP-MCNC: 96 MG/DL (ref 74–99)
GLUCOSE SERPL-MCNC: 123 MG/DL (ref 74–99)
HCT VFR BLD AUTO: 42.5 % (ref 36–46)
HDLC SERPL-MCNC: 61.5 MG/DL
HGB BLD-MCNC: 14.8 G/DL (ref 12–16)
IMM GRANULOCYTES # BLD AUTO: 0.04 X10*3/UL (ref 0–0.5)
IMM GRANULOCYTES NFR BLD AUTO: 0.4 % (ref 0–0.9)
INR PPP: 0.9 (ref 0.9–1.1)
LDLC SERPL CALC-MCNC: 92 MG/DL
LYMPHOCYTES # BLD AUTO: 1.61 X10*3/UL (ref 0.8–3)
LYMPHOCYTES NFR BLD AUTO: 14.8 %
MCH RBC QN AUTO: 34.1 PG (ref 26–34)
MCHC RBC AUTO-ENTMCNC: 34.8 G/DL (ref 32–36)
MCV RBC AUTO: 98 FL (ref 80–100)
MONOCYTES # BLD AUTO: 0.9 X10*3/UL (ref 0.05–0.8)
MONOCYTES NFR BLD AUTO: 8.3 %
NEUTROPHILS # BLD AUTO: 8.16 X10*3/UL (ref 1.6–5.5)
NEUTROPHILS NFR BLD AUTO: 75.1 %
NON HDL CHOLESTEROL: 119 MG/DL (ref 0–149)
NRBC BLD-RTO: 0 /100 WBCS (ref 0–0)
PLATELET # BLD AUTO: 272 X10*3/UL (ref 150–450)
POTASSIUM SERPL-SCNC: 3.1 MMOL/L (ref 3.5–5.3)
PROT SERPL-MCNC: 6.9 G/DL (ref 6.4–8.2)
PROTHROMBIN TIME: 10.5 SECONDS (ref 9.8–12.8)
RBC # BLD AUTO: 4.34 X10*6/UL (ref 4–5.2)
SODIUM SERPL-SCNC: 139 MMOL/L (ref 136–145)
TRIGL SERPL-MCNC: 131 MG/DL (ref 0–149)
VLDL: 26 MG/DL (ref 0–40)
WBC # BLD AUTO: 10.9 X10*3/UL (ref 4.4–11.3)

## 2024-10-15 PROCEDURE — 84484 ASSAY OF TROPONIN QUANT: CPT

## 2024-10-15 PROCEDURE — 99291 CRITICAL CARE FIRST HOUR: CPT | Mod: 25 | Performed by: EMERGENCY MEDICINE

## 2024-10-15 PROCEDURE — 82947 ASSAY GLUCOSE BLOOD QUANT: CPT

## 2024-10-15 PROCEDURE — 70540 MRI ORBIT/FACE/NECK W/O DYE: CPT

## 2024-10-15 PROCEDURE — 2550000001 HC RX 255 CONTRASTS: Performed by: EMERGENCY MEDICINE

## 2024-10-15 PROCEDURE — 83036 HEMOGLOBIN GLYCOSYLATED A1C: CPT | Mod: PARLAB | Performed by: INTERNAL MEDICINE

## 2024-10-15 PROCEDURE — 72141 MRI NECK SPINE W/O DYE: CPT

## 2024-10-15 PROCEDURE — 2500000001 HC RX 250 WO HCPCS SELF ADMINISTERED DRUGS (ALT 637 FOR MEDICARE OP): Performed by: INTERNAL MEDICINE

## 2024-10-15 PROCEDURE — 70450 CT HEAD/BRAIN W/O DYE: CPT | Performed by: RADIOLOGY

## 2024-10-15 PROCEDURE — 99223 1ST HOSP IP/OBS HIGH 75: CPT | Performed by: PSYCHIATRY & NEUROLOGY

## 2024-10-15 PROCEDURE — 85610 PROTHROMBIN TIME: CPT

## 2024-10-15 PROCEDURE — 70498 CT ANGIOGRAPHY NECK: CPT | Performed by: RADIOLOGY

## 2024-10-15 PROCEDURE — 70496 CT ANGIOGRAPHY HEAD: CPT | Performed by: RADIOLOGY

## 2024-10-15 PROCEDURE — 84075 ASSAY ALKALINE PHOSPHATASE: CPT

## 2024-10-15 PROCEDURE — 70551 MRI BRAIN STEM W/O DYE: CPT

## 2024-10-15 PROCEDURE — 2500000004 HC RX 250 GENERAL PHARMACY W/ HCPCS (ALT 636 FOR OP/ED): Performed by: INTERNAL MEDICINE

## 2024-10-15 PROCEDURE — 72141 MRI NECK SPINE W/O DYE: CPT | Performed by: STUDENT IN AN ORGANIZED HEALTH CARE EDUCATION/TRAINING PROGRAM

## 2024-10-15 PROCEDURE — 85025 COMPLETE CBC W/AUTO DIFF WBC: CPT

## 2024-10-15 PROCEDURE — 70547 MR ANGIOGRAPHY NECK W/O DYE: CPT

## 2024-10-15 PROCEDURE — 96374 THER/PROPH/DIAG INJ IV PUSH: CPT

## 2024-10-15 PROCEDURE — 93005 ELECTROCARDIOGRAM TRACING: CPT

## 2024-10-15 PROCEDURE — 80061 LIPID PANEL: CPT | Performed by: INTERNAL MEDICINE

## 2024-10-15 PROCEDURE — 2500000004 HC RX 250 GENERAL PHARMACY W/ HCPCS (ALT 636 FOR OP/ED): Performed by: EMERGENCY MEDICINE

## 2024-10-15 PROCEDURE — 72148 MRI LUMBAR SPINE W/O DYE: CPT | Performed by: STUDENT IN AN ORGANIZED HEALTH CARE EDUCATION/TRAINING PROGRAM

## 2024-10-15 PROCEDURE — 36415 COLL VENOUS BLD VENIPUNCTURE: CPT

## 2024-10-15 PROCEDURE — 85730 THROMBOPLASTIN TIME PARTIAL: CPT

## 2024-10-15 PROCEDURE — 99223 1ST HOSP IP/OBS HIGH 75: CPT | Performed by: INTERNAL MEDICINE

## 2024-10-15 PROCEDURE — 70450 CT HEAD/BRAIN W/O DYE: CPT

## 2024-10-15 PROCEDURE — 1200000002 HC GENERAL ROOM WITH TELEMETRY DAILY

## 2024-10-15 PROCEDURE — 70544 MR ANGIOGRAPHY HEAD W/O DYE: CPT

## 2024-10-15 PROCEDURE — 2500000001 HC RX 250 WO HCPCS SELF ADMINISTERED DRUGS (ALT 637 FOR MEDICARE OP): Performed by: EMERGENCY MEDICINE

## 2024-10-15 PROCEDURE — 72148 MRI LUMBAR SPINE W/O DYE: CPT

## 2024-10-15 PROCEDURE — 70498 CT ANGIOGRAPHY NECK: CPT

## 2024-10-15 RX ORDER — VIT C/E/ZN/COPPR/LUTEIN/ZEAXAN 250MG-90MG
25 CAPSULE ORAL DAILY
COMMUNITY

## 2024-10-15 RX ORDER — PREDNISONE 1 MG/1
1 TABLET ORAL DAILY
COMMUNITY

## 2024-10-15 RX ORDER — HYDRALAZINE HYDROCHLORIDE 20 MG/ML
10 INJECTION INTRAMUSCULAR; INTRAVENOUS
Status: ACTIVE | OUTPATIENT
Start: 2024-10-15 | End: 2024-10-17

## 2024-10-15 RX ORDER — SARILUMAB 200 MG/1.14ML
200 INJECTION, SOLUTION SUBCUTANEOUS
COMMUNITY

## 2024-10-15 RX ORDER — ATORVASTATIN CALCIUM 40 MG/1
40 TABLET, FILM COATED ORAL NIGHTLY
Status: DISCONTINUED | OUTPATIENT
Start: 2024-10-15 | End: 2024-10-18 | Stop reason: HOSPADM

## 2024-10-15 RX ORDER — POLYETHYLENE GLYCOL 3350 17 G/17G
17 POWDER, FOR SOLUTION ORAL DAILY PRN
Status: DISCONTINUED | OUTPATIENT
Start: 2024-10-15 | End: 2024-10-18 | Stop reason: HOSPADM

## 2024-10-15 RX ORDER — ASPIRIN 325 MG
325 TABLET ORAL ONCE
Status: COMPLETED | OUTPATIENT
Start: 2024-10-15 | End: 2024-10-15

## 2024-10-15 RX ORDER — POTASSIUM CHLORIDE 1.5 G/1.58G
40 POWDER, FOR SOLUTION ORAL ONCE
Status: COMPLETED | OUTPATIENT
Start: 2024-10-15 | End: 2024-10-15

## 2024-10-15 RX ORDER — LABETALOL HYDROCHLORIDE 5 MG/ML
10 INJECTION, SOLUTION INTRAVENOUS EVERY 10 MIN PRN
Status: DISCONTINUED | OUTPATIENT
Start: 2024-10-15 | End: 2024-10-17

## 2024-10-15 RX ORDER — HYDRALAZINE HYDROCHLORIDE 25 MG/1
25 TABLET, FILM COATED ORAL EVERY 6 HOURS PRN
Status: DISCONTINUED | OUTPATIENT
Start: 2024-10-17 | End: 2024-10-18 | Stop reason: HOSPADM

## 2024-10-15 RX ORDER — CLOPIDOGREL BISULFATE 75 MG/1
75 TABLET ORAL DAILY
Status: DISCONTINUED | OUTPATIENT
Start: 2024-10-15 | End: 2024-10-18 | Stop reason: HOSPADM

## 2024-10-15 RX ORDER — ASPIRIN 81 MG/1
81 TABLET ORAL DAILY
Status: DISCONTINUED | OUTPATIENT
Start: 2024-10-16 | End: 2024-10-18 | Stop reason: HOSPADM

## 2024-10-15 RX ORDER — ACETAMINOPHEN 325 MG/1
650 TABLET ORAL EVERY 4 HOURS PRN
Status: DISCONTINUED | OUTPATIENT
Start: 2024-10-15 | End: 2024-10-18 | Stop reason: HOSPADM

## 2024-10-15 RX ORDER — ONDANSETRON HYDROCHLORIDE 2 MG/ML
4 INJECTION, SOLUTION INTRAVENOUS ONCE
Status: COMPLETED | OUTPATIENT
Start: 2024-10-15 | End: 2024-10-15

## 2024-10-15 RX ORDER — BUPROPION HYDROCHLORIDE 300 MG/1
300 TABLET ORAL DAILY
COMMUNITY

## 2024-10-15 RX ORDER — FERROUS SULFATE, DRIED 160(50) MG
1 TABLET, EXTENDED RELEASE ORAL 2 TIMES DAILY
COMMUNITY
End: 2024-10-18 | Stop reason: HOSPADM

## 2024-10-15 RX ORDER — BACLOFEN 20 MG
1 TABLET ORAL NIGHTLY
COMMUNITY

## 2024-10-15 RX ORDER — HEPARIN SODIUM 5000 [USP'U]/ML
5000 INJECTION, SOLUTION INTRAVENOUS; SUBCUTANEOUS EVERY 8 HOURS
Status: DISCONTINUED | OUTPATIENT
Start: 2024-10-15 | End: 2024-10-18 | Stop reason: HOSPADM

## 2024-10-15 SDOH — SOCIAL STABILITY: SOCIAL INSECURITY: WITHIN THE LAST YEAR, HAVE YOU BEEN HUMILIATED OR EMOTIONALLY ABUSED IN OTHER WAYS BY YOUR PARTNER OR EX-PARTNER?: NO

## 2024-10-15 SDOH — SOCIAL STABILITY: SOCIAL INSECURITY: ARE YOU OR HAVE YOU BEEN THREATENED OR ABUSED PHYSICALLY, EMOTIONALLY, OR SEXUALLY BY ANYONE?: NO

## 2024-10-15 SDOH — SOCIAL STABILITY: SOCIAL INSECURITY
WITHIN THE LAST YEAR, HAVE YOU BEEN RAPED OR FORCED TO HAVE ANY KIND OF SEXUAL ACTIVITY BY YOUR PARTNER OR EX-PARTNER?: NO

## 2024-10-15 SDOH — ECONOMIC STABILITY: FOOD INSECURITY: WITHIN THE PAST 12 MONTHS, THE FOOD YOU BOUGHT JUST DIDN'T LAST AND YOU DIDN'T HAVE MONEY TO GET MORE.: NEVER TRUE

## 2024-10-15 SDOH — SOCIAL STABILITY: SOCIAL INSECURITY: HAVE YOU HAD THOUGHTS OF HARMING ANYONE ELSE?: NO

## 2024-10-15 SDOH — SOCIAL STABILITY: SOCIAL INSECURITY: DO YOU FEEL ANYONE HAS EXPLOITED OR TAKEN ADVANTAGE OF YOU FINANCIALLY OR OF YOUR PERSONAL PROPERTY?: NO

## 2024-10-15 SDOH — SOCIAL STABILITY: SOCIAL INSECURITY: WITHIN THE LAST YEAR, HAVE YOU BEEN AFRAID OF YOUR PARTNER OR EX-PARTNER?: NO

## 2024-10-15 SDOH — ECONOMIC STABILITY: INCOME INSECURITY: IN THE PAST 12 MONTHS HAS THE ELECTRIC, GAS, OIL, OR WATER COMPANY THREATENED TO SHUT OFF SERVICES IN YOUR HOME?: NO

## 2024-10-15 SDOH — SOCIAL STABILITY: SOCIAL INSECURITY: HAS ANYONE EVER THREATENED TO HURT YOUR FAMILY OR YOUR PETS?: NO

## 2024-10-15 SDOH — ECONOMIC STABILITY: FOOD INSECURITY: WITHIN THE PAST 12 MONTHS, YOU WORRIED THAT YOUR FOOD WOULD RUN OUT BEFORE YOU GOT THE MONEY TO BUY MORE.: NEVER TRUE

## 2024-10-15 SDOH — SOCIAL STABILITY: SOCIAL INSECURITY: ARE THERE ANY APPARENT SIGNS OF INJURIES/BEHAVIORS THAT COULD BE RELATED TO ABUSE/NEGLECT?: NO

## 2024-10-15 SDOH — SOCIAL STABILITY: SOCIAL INSECURITY: DOES ANYONE TRY TO KEEP YOU FROM HAVING/CONTACTING OTHER FRIENDS OR DOING THINGS OUTSIDE YOUR HOME?: NO

## 2024-10-15 SDOH — SOCIAL STABILITY: SOCIAL INSECURITY: WERE YOU ABLE TO COMPLETE ALL THE BEHAVIORAL HEALTH SCREENINGS?: YES

## 2024-10-15 SDOH — SOCIAL STABILITY: SOCIAL INSECURITY: DO YOU FEEL UNSAFE GOING BACK TO THE PLACE WHERE YOU ARE LIVING?: NO

## 2024-10-15 SDOH — SOCIAL STABILITY: SOCIAL INSECURITY
WITHIN THE LAST YEAR, HAVE YOU BEEN KICKED, HIT, SLAPPED, OR OTHERWISE PHYSICALLY HURT BY YOUR PARTNER OR EX-PARTNER?: NO

## 2024-10-15 SDOH — SOCIAL STABILITY: SOCIAL INSECURITY: ABUSE: ADULT

## 2024-10-15 SDOH — SOCIAL STABILITY: SOCIAL INSECURITY: HAVE YOU HAD ANY THOUGHTS OF HARMING ANYONE ELSE?: NO

## 2024-10-15 ASSESSMENT — LIFESTYLE VARIABLES
HOW OFTEN DO YOU HAVE 6 OR MORE DRINKS ON ONE OCCASION: NEVER
AUDIT-C TOTAL SCORE: 0
HOW OFTEN DO YOU HAVE A DRINK CONTAINING ALCOHOL: NEVER
SKIP TO QUESTIONS 9-10: 1
HOW MANY STANDARD DRINKS CONTAINING ALCOHOL DO YOU HAVE ON A TYPICAL DAY: PATIENT DOES NOT DRINK
AUDIT-C TOTAL SCORE: 0

## 2024-10-15 ASSESSMENT — ACTIVITIES OF DAILY LIVING (ADL)
WALKS IN HOME: INDEPENDENT
BATHING: INDEPENDENT
LACK_OF_TRANSPORTATION: NO
FEEDING YOURSELF: INDEPENDENT
HEARING - LEFT EAR: FUNCTIONAL
LACK_OF_TRANSPORTATION: NO
ADEQUATE_TO_COMPLETE_ADL: YES
PATIENT'S MEMORY ADEQUATE TO SAFELY COMPLETE DAILY ACTIVITIES?: YES
JUDGMENT_ADEQUATE_SAFELY_COMPLETE_DAILY_ACTIVITIES: YES
HEARING - RIGHT EAR: FUNCTIONAL
GROOMING: INDEPENDENT
DRESSING YOURSELF: INDEPENDENT
TOILETING: INDEPENDENT

## 2024-10-15 ASSESSMENT — COGNITIVE AND FUNCTIONAL STATUS - GENERAL
CLIMB 3 TO 5 STEPS WITH RAILING: A LITTLE
MOBILITY SCORE: 20
MOVING TO AND FROM BED TO CHAIR: A LITTLE
DAILY ACTIVITIY SCORE: 24
STANDING UP FROM CHAIR USING ARMS: A LITTLE
PATIENT BASELINE BEDBOUND: NO
WALKING IN HOSPITAL ROOM: A LITTLE

## 2024-10-15 ASSESSMENT — ENCOUNTER SYMPTOMS
NAUSEA: 1
DIZZINESS: 1

## 2024-10-15 ASSESSMENT — PATIENT HEALTH QUESTIONNAIRE - PHQ9
SUM OF ALL RESPONSES TO PHQ9 QUESTIONS 1 & 2: 0
2. FEELING DOWN, DEPRESSED OR HOPELESS: NOT AT ALL
1. LITTLE INTEREST OR PLEASURE IN DOING THINGS: NOT AT ALL

## 2024-10-15 ASSESSMENT — COLUMBIA-SUICIDE SEVERITY RATING SCALE - C-SSRS
6. HAVE YOU EVER DONE ANYTHING, STARTED TO DO ANYTHING, OR PREPARED TO DO ANYTHING TO END YOUR LIFE?: NO
2. HAVE YOU ACTUALLY HAD ANY THOUGHTS OF KILLING YOURSELF?: NO
1. IN THE PAST MONTH, HAVE YOU WISHED YOU WERE DEAD OR WISHED YOU COULD GO TO SLEEP AND NOT WAKE UP?: NO

## 2024-10-15 ASSESSMENT — PAIN DESCRIPTION - ORIENTATION: ORIENTATION: LOWER;MID

## 2024-10-15 ASSESSMENT — PAIN SCALES - GENERAL
PAINLEVEL_OUTOF10: 7
PAINLEVEL_OUTOF10: 0 - NO PAIN
PAINLEVEL_OUTOF10: 7

## 2024-10-15 ASSESSMENT — PAIN DESCRIPTION - LOCATION: LOCATION: BACK

## 2024-10-15 ASSESSMENT — PAIN - FUNCTIONAL ASSESSMENT: PAIN_FUNCTIONAL_ASSESSMENT: 0-10

## 2024-10-15 NOTE — H&P
Chief Complaint   Patient presents with    Numbness    Vomiting     Patient arrives from home with complaints of nausea and vomiting since last hs.  Patient also states she has right sided facial numbness since 9am.       KAT Moore is a 69 y.o. female with a PMHx of osteoporosis who presented to [facility] on 10/16/2024 with a chief complaint of nausea vomiting right facial paresthesia and gait instability.  Her symptoms started the night before admission, and during the day she had right facial paresthesia.  She is having numbness as if she had Novocain injected to her right side of the face.  She was also having intermittent nausea and vomiting. She lost a lot of weight recently. She denies any headaches, change in vision, difficulty swallowing, change in hearing, fever/chills, chest pain, SOB, palpitation, cough, abdominal pain, change in bowel habits/urine, joint pain/swelling, weakness in any of the extremities or swelling, insomnia or any symptoms of depression.    ROS: 10 point review of systems negative with the exception of above.    ED Course:  NIH in the ED 2  ED Triage Vitals   Temperature Heart Rate Respirations BP   10/15/24 1451 10/15/24 1451 10/15/24 1451 10/15/24 1451   36.1 °C (97 °F) 67 18 152/80      Pulse Ox Temp Source Heart Rate Source Patient Position   10/15/24 1451 10/15/24 1451 10/15/24 1451 10/15/24 1600   99 % Temporal Monitor Sitting      BP Location FiO2 (%)     10/15/24 1451 --     Right arm          Labs:  Abnormal Labs Reviewed   CBC WITH AUTO DIFFERENTIAL - Abnormal; Notable for the following components:       Result Value    MCH 34.1 (*)     Neutrophils Absolute 8.16 (*)     Monocytes Absolute 0.90 (*)     All other components within normal limits   COMPREHENSIVE METABOLIC PANEL - Abnormal; Notable for the following components:    Glucose 123 (*)     Potassium 3.1 (*)     Urea Nitrogen 29 (*)     Creatinine 1.24 (*)     eGFR 47 (*)     Calcium 10.5 (*)     All other components  within normal limits   POCT GLUCOSE - Abnormal; Notable for the following components:    POCT Glucose 116 (*)     All other components within normal limits        No orders to display       CT brain attack angio head and neck W and WO IV contrast   Final Result   CTA neck:        1. Markedly diminished contrast related enhancement located within   the V2/V3 segment of the right vertebral artery with gradual loss of   contrast related enhancement within the V3 segment. On the prior MRI   cervical spine from 2020, flow voids were seen within the right   vertebral artery within these regions, therefore these findings could   reflect a sequela of an age indeterminate dissection involving the V3   segment of the right vertebral artery. Consider further evaluation   with MRI of the brain and dissection protocol.        2. Otherwise, no narrowing of the visualized arteries in the neck.        CTA head:        1. No contrast related enhancement located within the right   intradural vertebral artery until the vertebrobasilar junction.        2. Left intradural vertebral artery and basilar artery are diffusely   decreased in luminal caliber, possibly developmental variant as there   are prominent bilateral posterior communicating arteries which appear   to primarily supply the bilateral posterior cerebral arteries.        3. Otherwise, no striking narrowing of the visualized arteries in the   head.        This study was interpreted at [facility].             MACRO:   Vy Gonzalez discussed the significance and urgency of this critical   finding by telephone with  Dr. Hui on 10/15/2024 at 3:47 pm.   (**-RCF-**) Findings:  See findings.        e        Signed by: Jerod Gonzalez 10/15/2024 3:48 PM   Dictation workstation:   [id number]      CT brain attack head wo IV contrast   Final Result   No acute intracranial pathology.        MACRO:   Filomena Aquino discussed the significance  and urgency of this   critical finding plate secure chat with  GILDA MCKEON on   10/15/2024 at 3:23 pm.  (**-RCF-**) Findings:  See findings.        Signed by: Filomena Miles 10/15/2024 3:23 PM   Dictation workstation:   [id number]      MR brain wo IV contrast    (Results Pending)   MR cervical spine wo IV contrast    (Results Pending)   MR lumbar spine wo IV contrast    (Results Pending)   MR neck soft tissue only wo IV contrast    (Results Pending)   MR angio neck wo IV contrast    (Results Pending)   Transthoracic Echo (TTE) Complete    (Results Pending)   MR angio head wo IV contrast    (Results Pending)     CT brain attack angio head and neck W and WO IV contrast   Final Result   CTA neck:        1. Markedly diminished contrast related enhancement located within   the V2/V3 segment of the right vertebral artery with gradual loss of   contrast related enhancement within the V3 segment. On the prior MRI   cervical spine from 2020, flow voids were seen within the right   vertebral artery within these regions, therefore these findings could   reflect a sequela of an age indeterminate dissection involving the V3   segment of the right vertebral artery. Consider further evaluation   with MRI of the brain and dissection protocol.        2. Otherwise, no narrowing of the visualized arteries in the neck.        CTA head:        1. No contrast related enhancement located within the right   intradural vertebral artery until the vertebrobasilar junction.        2. Left intradural vertebral artery and basilar artery are diffusely   decreased in luminal caliber, possibly developmental variant as there   are prominent bilateral posterior communicating arteries which appear   to primarily supply the bilateral posterior cerebral arteries.        3. Otherwise, no striking narrowing of the visualized arteries in the   head.        This study was interpreted at [facility].              MACRO:   Vy Gonzalez discussed the significance and urgency of this critical   finding by telephone with  Dr. Hui on 10/15/2024 at 3:47 pm.   (**-RCF-**) Findings:  See findings.        e        Signed by: Jerod Gonzalez 10/15/2024 3:48 PM   Dictation workstation:   [id number]      CT brain attack head wo IV contrast   Final Result   No acute intracranial pathology.        MACRO:   Filomena Aquino discussed the significance and urgency of this   critical finding plate secure chat with  GILDA MCKEON on   10/15/2024 at 3:23 pm.  (**-RCF-**) Findings:  See findings.        Signed by: Filomena Aquino 10/15/2024 3:23 PM   Dictation workstation:   [id number]              Intervention: In ED, patient received   Medications   oxygen (O2) therapy (has no administration in time range)   labetaloL (Normodyne,Trandate) injection 10 mg (has no administration in time range)   hydrALAZINE (Apresoline) injection 10 mg (has no administration in time range)     Followed by   hydrALAZINE (Apresoline) tablet 25 mg (has no administration in time range)   polyethylene glycol (Glycolax, Miralax) packet 17 g (has no administration in time range)   heparin (porcine) injection 5,000 Units ( subcutaneous Dose Auto Held 10/19/24 1625)   aspirin EC tablet 81 mg (has no administration in time range)   atorvastatin (Lipitor) tablet 40 mg (has no administration in time range)   acetaminophen (Tylenol) tablet 650 mg (has no administration in time range)   potassium chloride (Klor-Con) packet 40 mEq (has no administration in time range)   clopidogrel (Plavix) tablet 75 mg (has no administration in time range)   iohexol (OMNIPaque) 350 mg iodine/mL solution 80 mL (80 mL intravenous Given 10/15/24 4339)   ondansetron (Zofran) injection 4 mg (4 mg intravenous Given 10/15/24 1553)   aspirin tablet 325 mg (325 mg oral Given 10/15/24 4591)      Patient was then transferred to the floor for further management      Meds:   Modified  Medications    No medications on file       Follows up with Dr. Rupesh Campbell DO      Past Medical History     Past Medical History:   Diagnosis Date    Other specified disorders of adrenal gland 06/25/2018    Adrenal nodule    Personal history of other diseases of the respiratory system     History of bronchitis    Personal history of other diseases of the respiratory system     History of pharyngitis    Personal history of other infectious and parasitic diseases     History of viral infection    Personal history of other mental and behavioral disorders     History of depression    Personal history of other mental and behavioral disorders     History of depression    Personal history of other specified conditions     History of diarrhea    Personal history of other specified conditions     History of abdominal pain    Syncope and collapse     Vasovagal syncope    Unspecified osteoarthritis, unspecified site     Osteoarthritis      Surgical History     Past Surgical History:   Procedure Laterality Date    HYSTERECTOMY  11/28/2015    Hysterectomy    KNEE SURGERY  05/30/2018    Knee Surgery    OTHER SURGICAL HISTORY  11/28/2015    Tympanic Membrane Repair     Family History   No family history on file.  Social History     Tobacco Use: High Risk (10/15/2024)    Patient History     Smoking Tobacco Use: Every Day     Smokeless Tobacco Use: Never     Passive Exposure: Not on file      Social History     Substance and Sexual Activity   Alcohol Use Never      Allergies     Allergies   Allergen Reactions    Latex Itching      Meds    Scheduled medications  [START ON 10/16/2024] aspirin, 81 mg, oral, Daily  atorvastatin, 40 mg, oral, Nightly  clopidogrel, 75 mg, oral, Daily  [Held by provider] heparin (porcine), 5,000 Units, subcutaneous, q8h  potassium chloride, 40 mEq, oral, Once      Continuous medications     PRN medications  PRN medications: acetaminophen, hydrALAZINE **FOLLOWED BY** [START ON 10/17/2024]  "hydrALAZINE, labetaloL, oxygen, polyethylene glycol   Objective     Vitals  Visit Vitals  /69   Pulse 69   Temp 36.1 °C (97 °F) (Temporal)   Resp 20   Ht 1.6 m (5' 3\")   Wt 58.1 kg (128 lb)   SpO2 94%   BMI 22.67 kg/m²   Smoking Status Every Day   BSA 1.61 m²        Review of Systems   Constitutional: Negative.    HENT: Negative.     Eyes: Negative.    Gastrointestinal:  Positive for nausea and vomiting.   Endocrine: Negative.    Genitourinary: Negative.    Musculoskeletal: Negative.    Skin: Negative.    Neurological:  Positive for weakness and numbness.   Hematological: Negative.    Psychiatric/Behavioral: Negative.       Temperature:  [36.1 °C (97 °F)] 36.1 °C (97 °F)  Heart Rate:  [55-69] 69  Respirations:  [10-20] 20  BP: (122-171)/(68-83) 122/69  No intake/output data recorded.  No intake/output data recorded.  Physical Exam  Constitutional:       Appearance: Normal appearance. She is normal weight.   HENT:      Head: Normocephalic and atraumatic.      Nose: Nose normal.   Eyes:      Extraocular Movements: Extraocular movements intact.      Conjunctiva/sclera: Conjunctivae normal.      Pupils: Pupils are equal, round, and reactive to light.   Cardiovascular:      Rate and Rhythm: Normal rate and regular rhythm.      Pulses: Normal pulses.      Heart sounds: Normal heart sounds.   Pulmonary:      Effort: Pulmonary effort is normal.      Breath sounds: Normal breath sounds.   Abdominal:      General: Abdomen is flat. Bowel sounds are normal.      Palpations: Abdomen is soft.   Musculoskeletal:         General: Normal range of motion.      Cervical back: Normal range of motion and neck supple.   Skin:     General: Skin is warm and dry.   Neurological:      General: No focal deficit present.      Mental Status: She is alert and oriented to person, place, and time. Mental status is at baseline.   Psychiatric:         Mood and Affect: Mood normal.         Behavior: Behavior normal.         Thought Content: " "Thought content normal.       Principal Problem:    Facial paresthesia  Active Problems:    Cerebral infarction due to occlusion of right vertebral artery (Multi)          I/Os  No intake or output data in the 24 hours ending 10/15/24 1916      Labs:   Results from last 72 hours   Lab Units 10/15/24  1500   SODIUM mmol/L 139   POTASSIUM mmol/L 3.1*   CHLORIDE mmol/L 99   CO2 mmol/L 32   BUN mg/dL 29*   CREATININE mg/dL 1.24*   GLUCOSE mg/dL 123*   CALCIUM mg/dL 10.5*   ANION GAP mmol/L 11   EGFR mL/min/1.73m*2 47*      Results from last 72 hours   Lab Units 10/15/24  1500   WBC AUTO x10*3/uL 10.9   HEMOGLOBIN g/dL 14.8   HEMATOCRIT % 42.5   PLATELETS AUTO x10*3/uL 272   NEUTROS PCT AUTO % 75.1   LYMPHS PCT AUTO % 14.8   MONOS PCT AUTO % 8.3   EOS PCT AUTO % 1.0      Lab Results   Component Value Date    CALCIUM 10.5 (H) 10/15/2024    PHOS 3.1 01/09/2023      Lab Results   Component Value Date    CRP 0.41 05/11/2022      [unfilled]     Micro/ID:   No results found for the last 90 days.                   No lab exists for component: \"AGALPCRNB\"   .ID  No results found for: \"URINECULTURE\", \"BLOODCULT\", \"CSFCULTSMEAR\"  Images    CT brain attack angio head and neck W and WO IV contrast  Narrative: Interpreted By:  Jerod Gonzalez,   STUDY:  CT BRAIN ATTACK ANGIO HEAD AND NECK W AND WO IV CONTRAST; ;  10/15/2024 3:15 pm      INDICATION:  Signs/Symptoms:right sided face weakness, NIH stroke scale zero.          COMPARISON:  CT head from 10/15/2024.      ACCESSION NUMBER(S):  [id number]      ORDERING CLINICIAN:  GILDA MCKEON      TECHNIQUE:  CTA of the head and neck was performed after administration of 80 mL  Omnipaque 350 intravenously. Segmented MIPs are provided.      FINDINGS:  CTA neck:      The aortic arch and origins of the great vessels arising from the  aortic arch are without luminal narrowing. There is tortuosity of the  bilateral common carotid arteries and there is no luminal narrowing.      There is " minimal atherosclerotic calcification located within the  right carotid bulb. There is 0% luminal narrowing of the bilateral  carotid bulbs, according to NASCET criteria. There is no narrowing of  the bilateral internal carotid arteries.      Bilateral vertebral arteries arise from the subclavian arteries.  There is no narrowing of the left vertebral artery.      Right vertebral artery arises from the right subclavian arteries.  Contrast related enhancement is seen within the V1 and V2 segments of  the right vertebral artery. There is diminished contrast related  enhancement within the V2 V3 junction of the right vertebral artery  with diminished contrast related enhancement located within the V3  segment. No striking contrast related enhancement is seen within the  superior-most portion of the V3 segment of the right vertebral artery.      CTA head:      There are atherosclerotic calcifications located within the  paraophthalmic segments of the bilateral internal carotid arteries  causing no luminal narrowing. There is no narrowing of the visualized  portions of the bilateral internal carotid arteries. There is no  narrowing of the visualized portions of the bilateral anterior or  middle cerebral arteries.      The left intradural vertebral artery is somewhat decreased in luminal  caliber. The right intradural vertebral artery demonstrates  essentially no contrast related enhancement until the distal most  portion at the vertebrobasilar junction. The basilar artery is mildly  diffusely decreased in luminal caliber and contains a fenestration  within the basilar tip.      There are prominent bilateral posterior communicating arteries which  appear to primarily supply the bilateral posterior cerebral arteries.  There is no striking narrowing of the visualized portions of the  bilateral posterior cerebral arteries.      There are no aneurysms.      Additional findings:      There are osseous degenerative changes of  the cervical spine. The  thyroid gland is heterogeneous in appearance and there is a partially  calcified 1 cm nodule located within the posterior portion of the  right thyroid lobe.      Impression: CTA neck:      1. Markedly diminished contrast related enhancement located within  the V2/V3 segment of the right vertebral artery with gradual loss of  contrast related enhancement within the V3 segment. On the prior MRI  cervical spine from 2020, flow voids were seen within the right  vertebral artery within these regions, therefore these findings could  reflect a sequela of an age indeterminate dissection involving the V3  segment of the right vertebral artery. Consider further evaluation  with MRI of the brain and dissection protocol.      2. Otherwise, no narrowing of the visualized arteries in the neck.      CTA head:      1. No contrast related enhancement located within the right  intradural vertebral artery until the vertebrobasilar junction.      2. Left intradural vertebral artery and basilar artery are diffusely  decreased in luminal caliber, possibly developmental variant as there  are prominent bilateral posterior communicating arteries which appear  to primarily supply the bilateral posterior cerebral arteries.      3. Otherwise, no striking narrowing of the visualized arteries in the  head.      This study was interpreted at [facility].          MACRO:  Vy Gonzalez discussed the significance and urgency of this critical  finding by telephone with  Dr. Hui on 10/15/2024 at 3:47 pm.  (**-RCF-**) Findings:  See findings.      e      Signed by: Jerod Gonzalez 10/15/2024 3:48 PM  Dictation workstation:   [id number]  CT brain attack head wo IV contrast  Narrative: Interpreted By:  Filomena Aquino,   STUDY:  CT BRAIN ATTACK HEAD WO IV CONTRAST;  10/15/2024 2:58 pm      INDICATION:  Signs/Symptoms:Stroke Evaluation.      COMPARISON:  None      ACCESSION  NUMBER(S):  [id number]      ORDERING CLINICIAN:  GILDA MCKEON      TECHNIQUE:  Examination was performed in the axial plane with sagittal and  coronal reconstructions.      FINDINGS:  INTRACRANIAL:  There is prominence of the ventricular system and cerebral sulci  consistent with cerebral atrophy. No mass or mass effect is  identified. There is no hemorrhage or subdural fluid collection.  There is no acute infarct.          EXTRACRANIAL:  Visualized paranasal sinuses and mastoids are clear.      Impression: No acute intracranial pathology.      MACRO:  Filomena Aquino discussed the significance and urgency of this  critical finding plate secure chat with  GILDA MCKEON on  10/15/2024 at 3:23 pm.  (**-RCF-**) Findings:  See findings.      Signed by: Filomena Aquino 10/15/2024 3:23 PM  Dictation workstation:   [id number]    Assessment and Plan    Jahaira Moore is a 69 y.o. female admitted for nausea, vomiting and abnormal gait(Stroke like symptoms)     Acute Medical Issues   #CVA  -NIH score on admission: 2  -Last know normal: One night before  -CT Head non-contrast No acute intracranial pathology.   -MRI brain; Discontinuous nodular foci of diffusion restriction are present  within the a geographic territory in the inferior medial right  cerebellum, consistent with acute to early subacute infarcts. There  is slight local mass effect without evidence of hemorrhagic  transformation.  -MRA head/neck; Asymmetrically diminished flow enhancement is present in the expected location of the extracranial right vertebral artery compared  to the contralateral left side proximally, with no flow enhancement identified in the vessel more distally.  -EKG; NSR  -TTE; pending  -Neuro consulted; appreciated recs;   -Neuro checks q4h (no tPA)  -Telemetry  -Start ASA 81mg daily and Start Clopidogrel 75mg PO qd  -Fasted Lipid panel; pending  -Atorvastatin 40mg qHS  -HbA1c; pending  -PT/OT consulted; pending  -Maintain blood pressure  160-180 systolic for 24 hours          Chronic Medical Issues   # Rheumatoid arthritis:  -Continue prednisone 60 mg daily      #HTN:  -Holding chlorthalidone in the setting of permissive hypertension.      # Insomnia:  -Continue trazodone 50 mg daily.    # Patient is on Wellbutrin 300 mg daily, unclear if she is taking it and what is the indication.    # Peripheral neuropathy  -Continue gabapentin 300 mg 3 times daily      #CKD:  Serum creatinine admission 1.24, which is better than her baseline.      F: PO intake & IVF PRN  E: Replete as needed  N: Regular diet  GI ppx: Protonix 40 mg daily   DVT ppx: Start heparin subcutaneous tomorrow   Antibiotics: None  Oxygenation: RA    Code Status: Full Code   Emergency Contact: Extended Emergency Contact Information  Primary Emergency Contact: Hortencia Cook  Home Phone: [phone]  Relation: Sibling   needed? No     Disposition: 69 y.o.female admitted for CVA, anticipate LOS < 2 midnights.         Kimmie Rizvi  Internal Medicine, Hospitalist   [facility]  [place]

## 2024-10-15 NOTE — PROGRESS NOTES
PHARMACY STROKE RESPONSE      Patient Name: Jahaira Moore  MRN: [id number]  Location: [place] [address]    Patient Weight (kg):   Wt Readings from Last 1 Encounters:   10/15/24 58.5 kg (128 lb 15.5 oz)        An acute Brain Attack has been activated, pharmacy participated in multidisciplinary team bedside response for Jahaira Moore.  Contraindications for fibrinolytic therapy have been reviewed by pharmacy and any issues relating to medication therapy have been discussed directly with the provider(s) caring for this patient.     Pharmacy aided in the bedside response for fibrinolytic therapy. Patient did not receive fibrinolysis.    Orders Placed This Encounter      iohexol (OMNIPaque) 350 mg iodine/mL solution 80 mL      ondansetron (Zofran) injection 4 mg      Thank you for allowing me to take part in the care of this patient.     Jocelyn Dobson, PharmD  10/15/2024  3:45 PM         References:    Neurological [place] Stroke Tools   Neurological [place] IV Thrombolysis Checklist

## 2024-10-15 NOTE — CONSULTS
Inpatient consult to Neurology  Consult performed by: Scout Roa MD  Consult ordered by: Radha Spaulding PA-C          History Of Present Illness  Jaharia Moore is a 69 y.o. female presenting with vomiting.  The patient states that she was doing well until last night.  At about 8 PM she had the onset of nausea and vomiting.  The patient went to bed and when she woke up at around 9 AM she noted that she had some right facial numbness.  The patient also states that she has had difficulties with ambulation.  The patient has still had some nausea and vomiting.  She denies any headache, vision changes, extremity numbness, bowel or bladder incontinence, upper or lower extremity weakness or problems with coordination.  The patient states that she normally ambulates independently but she uses a walker when she is outside the house.  The patient states that she fell twice today.  The patient was admitted through the ER and a stroke team was called.  The patient was not an IV TNK candidate as she has a very low NIH stroke scale score and she was out of the time window. Currently the patient is having significant difficulty with ambulation and needs assistance to stand and walk.  The patient had a CT scan of the brain done with no evidence to suggest an acute process.  The patient had  A CT angiogram of the neck that showed markedly diminished contrast enhancement noted in the V2/V3 segment of the right vertebral artery with gradual loss of contrast enhancement within the V3 segment.  There is no contrast-enhancement noted in the right intradural vertebral artery until the vertebrobasilar junction.  The left intradural vertebral artery and basilar artery are diffusely decreased in luminal caliber but the patient does have prominent bilateral posterior communicating arteries.      Past Medical History  Past Medical History:   Diagnosis Date    Other specified disorders of adrenal gland (Multi) 06/25/2018    Adrenal nodule  "   Personal history of other diseases of the respiratory system     History of bronchitis    Personal history of other diseases of the respiratory system     History of pharyngitis    Personal history of other infectious and parasitic diseases     History of viral infection    Personal history of other mental and behavioral disorders     History of depression    Personal history of other mental and behavioral disorders     History of depression    Personal history of other specified conditions     History of diarrhea    Personal history of other specified conditions     History of abdominal pain    Syncope and collapse     Vasovagal syncope    Unspecified osteoarthritis, unspecified site     Osteoarthritis     Surgical History  Past Surgical History:   Procedure Laterality Date    HYSTERECTOMY  11/28/2015    Hysterectomy    KNEE SURGERY  05/30/2018    Knee Surgery    OTHER SURGICAL HISTORY  11/28/2015    Tympanic Membrane Repair     Social History  Social History     Tobacco Use    Smoking status: Every Day     Current packs/day: 0.25     Types: Cigarettes    Smokeless tobacco: Never   Substance Use Topics    Alcohol use: Never    Drug use: Never     Allergies  Latex  (Not in a hospital admission)    Family history   The patient's family history was reviewed and is noncontributory.    Review of Systems   Gastrointestinal:  Positive for nausea.   Neurological:  Positive for dizziness.   All other systems reviewed and are negative.      Neurological Exam  Physical Exam  Last Recorded Vitals  Blood pressure 150/68, pulse 55, temperature 36.1 °C (97 °F), temperature source Temporal, resp. rate 18, height 1.6 m (5' 3\"), weight 58.5 kg (128 lb 15.5 oz), SpO2 94%.  The patient is a well developed, [well-nourished] [female] in no acute distress.    The patient's funduscopic examination shows no papilledema bilaterally.    The patient's extremity examination shows that the pulses are 2+ in the upper and lower extremities " bilaterally and there is no edema in the lower extremities bilaterally.    The patient's mental status testing is alert and oriented ×3 with no evidence of aphasia or dysarthria.  The patient's memory testing, fund of knowledge and concentration are all within normal limits.  The patient's cranial nerves 2, 3, 4, 5, 6, 7, 8, 9, 10, 11 and 12 are all within normal limits.  The patient's motor testing shows normal tone, bulk, and power in the upper and lower extremities bilaterally.  The patient's sensory testing is intact to light touch in the upper and lower extremities bilaterally.  There is no sensory level to light touch.  The patient's cerebellar testing is intact in the upper and lower extremities bilaterally.  The patient's station and gait are moderately unsteady and the patient needs assistance to stand.  The patient's reflexes are 1+ in the upper and lower extremities and symmetrical.    Relevant Results    The patient's NIHSS was 0.      Scheduled medications    Continuous medications    PRN medications    Results for orders placed or performed during the hospital encounter of 10/15/24 (from the past 96 hour(s))   POCT GLUCOSE   Result Value Ref Range    POCT Glucose 116 (H) 74 - 99 mg/dL   CBC and Auto Differential   Result Value Ref Range    WBC 10.9 4.4 - 11.3 x10*3/uL    nRBC 0.0 0.0 - 0.0 /100 WBCs    RBC 4.34 4.00 - 5.20 x10*6/uL    Hemoglobin 14.8 12.0 - 16.0 g/dL    Hematocrit 42.5 36.0 - 46.0 %    MCV 98 80 - 100 fL    MCH 34.1 (H) 26.0 - 34.0 pg    MCHC 34.8 32.0 - 36.0 g/dL    RDW 12.3 11.5 - 14.5 %    Platelets 272 150 - 450 x10*3/uL    Neutrophils % 75.1 40.0 - 80.0 %    Immature Granulocytes %, Automated 0.4 0.0 - 0.9 %    Lymphocytes % 14.8 13.0 - 44.0 %    Monocytes % 8.3 2.0 - 10.0 %    Eosinophils % 1.0 0.0 - 6.0 %    Basophils % 0.4 0.0 - 2.0 %    Neutrophils Absolute 8.16 (H) 1.60 - 5.50 x10*3/uL    Immature Granulocytes Absolute, Automated 0.04 0.00 - 0.50 x10*3/uL    Lymphocytes  Absolute 1.61 0.80 - 3.00 x10*3/uL    Monocytes Absolute 0.90 (H) 0.05 - 0.80 x10*3/uL    Eosinophils Absolute 0.11 0.00 - 0.40 x10*3/uL    Basophils Absolute 0.04 0.00 - 0.10 x10*3/uL   Comprehensive metabolic panel   Result Value Ref Range    Glucose 123 (H) 74 - 99 mg/dL    Sodium 139 136 - 145 mmol/L    Potassium 3.1 (L) 3.5 - 5.3 mmol/L    Chloride 99 98 - 107 mmol/L    Bicarbonate 32 21 - 32 mmol/L    Anion Gap 11 10 - 20 mmol/L    Urea Nitrogen 29 (H) 6 - 23 mg/dL    Creatinine 1.24 (H) 0.50 - 1.05 mg/dL    eGFR 47 (L) >60 mL/min/1.73m*2    Calcium 10.5 (H) 8.6 - 10.3 mg/dL    Albumin 4.6 3.4 - 5.0 g/dL    Alkaline Phosphatase 55 33 - 136 U/L    Total Protein 6.9 6.4 - 8.2 g/dL    AST 14 9 - 39 U/L    Bilirubin, Total 0.6 0.0 - 1.2 mg/dL    ALT 12 7 - 45 U/L   Troponin I, High Sensitivity   Result Value Ref Range    Troponin I, High Sensitivity 12 0 - 13 ng/L   Protime-INR   Result Value Ref Range    Protime 10.5 9.8 - 12.8 seconds    INR 0.9 0.9 - 1.1   APTT   Result Value Ref Range    aPTT 29 27 - 38 seconds          I have personally reviewed the following imaging results CT brain attack angio head and neck W and WO IV contrast    Result Date: 10/15/2024  Interpreted By:  Jerod Gonzalez, STUDY: CT BRAIN ATTACK ANGIO HEAD AND NECK W AND WO IV CONTRAST; ; 10/15/2024 3:15 pm   INDICATION: Signs/Symptoms:right sided face weakness, NIH stroke scale zero.     COMPARISON: CT head from 10/15/2024.   ACCESSION NUMBER(S): [id number]   ORDERING CLINICIAN: GILDA MCKEON   TECHNIQUE: CTA of the head and neck was performed after administration of 80 mL Omnipaque 350 intravenously. Segmented MIPs are provided.   FINDINGS: CTA neck:   The aortic arch and origins of the great vessels arising from the aortic arch are without luminal narrowing. There is tortuosity of the bilateral common carotid arteries and there is no luminal narrowing.   There is minimal atherosclerotic calcification located within the right carotid  bulb. There is 0% luminal narrowing of the bilateral carotid bulbs, according to NASCET criteria. There is no narrowing of the bilateral internal carotid arteries.   Bilateral vertebral arteries arise from the subclavian arteries. There is no narrowing of the left vertebral artery.   Right vertebral artery arises from the right subclavian arteries. Contrast related enhancement is seen within the V1 and V2 segments of the right vertebral artery. There is diminished contrast related enhancement within the V2 V3 junction of the right vertebral artery with diminished contrast related enhancement located within the V3 segment. No striking contrast related enhancement is seen within the superior-most portion of the V3 segment of the right vertebral artery.   CTA head:   There are atherosclerotic calcifications located within the paraophthalmic segments of the bilateral internal carotid arteries causing no luminal narrowing. There is no narrowing of the visualized portions of the bilateral internal carotid arteries. There is no narrowing of the visualized portions of the bilateral anterior or middle cerebral arteries.   The left intradural vertebral artery is somewhat decreased in luminal caliber. The right intradural vertebral artery demonstrates essentially no contrast related enhancement until the distal most portion at the vertebrobasilar junction. The basilar artery is mildly diffusely decreased in luminal caliber and contains a fenestration within the basilar tip.   There are prominent bilateral posterior communicating arteries which appear to primarily supply the bilateral posterior cerebral arteries. There is no striking narrowing of the visualized portions of the bilateral posterior cerebral arteries.   There are no aneurysms.   Additional findings:   There are osseous degenerative changes of the cervical spine. The thyroid gland is heterogeneous in appearance and there is a partially calcified 1 cm nodule located  within the posterior portion of the right thyroid lobe.       CTA neck:   1. Markedly diminished contrast related enhancement located within the V2/V3 segment of the right vertebral artery with gradual loss of contrast related enhancement within the V3 segment. On the prior MRI cervical spine from 2020, flow voids were seen within the right vertebral artery within these regions, therefore these findings could reflect a sequela of an age indeterminate dissection involving the V3 segment of the right vertebral artery. Consider further evaluation with MRI of the brain and dissection protocol.   2. Otherwise, no narrowing of the visualized arteries in the neck.   CTA head:   1. No contrast related enhancement located within the right intradural vertebral artery until the vertebrobasilar junction.   2. Left intradural vertebral artery and basilar artery are diffusely decreased in luminal caliber, possibly developmental variant as there are prominent bilateral posterior communicating arteries which appear to primarily supply the bilateral posterior cerebral arteries.   3. Otherwise, no striking narrowing of the visualized arteries in the head.   This study was interpreted at [facility].     MACRO: Vy Gonzalez discussed the significance and urgency of this critical finding by telephone with  Dr. Hui on 10/15/2024 at 3:47 pm. (**-RCF-**) Findings:  See findings.   e   Signed by: Jerod Gonzalez 10/15/2024 3:48 PM Dictation workstation:   [id number]    CT brain attack head wo IV contrast    Result Date: 10/15/2024  Interpreted By:  Filomena Aquino, STUDY: CT BRAIN ATTACK HEAD WO IV CONTRAST;  10/15/2024 2:58 pm   INDICATION: Signs/Symptoms:Stroke Evaluation.   COMPARISON: None   ACCESSION NUMBER(S): [id number]   ORDERING CLINICIAN: GILDA MCKEON   TECHNIQUE: Examination was performed in the axial plane with sagittal and coronal reconstructions.   FINDINGS: INTRACRANIAL: There is  prominence of the ventricular system and cerebral sulci consistent with cerebral atrophy. No mass or mass effect is identified. There is no hemorrhage or subdural fluid collection. There is no acute infarct.     EXTRACRANIAL: Visualized paranasal sinuses and mastoids are clear.       No acute intracranial pathology.   MACRO: Filomena Miles discussed the significance and urgency of this critical finding plate secure chat with  GILDA MCKEON on 10/15/2024 at 3:23 pm.  (**-RCF-**) Findings:  See findings.   Signed by: Filomena Aquino 10/15/2024 3:23 PM Dictation workstation:   [id number]    CT lung screening low dose    Result Date: 9/28/2024  Interpreted By:  Neil Lomeli, STUDY: CT LUNG SCREENING LOW DOSE; 9/26/2024 10:51 am   INDICATION: Signs/Symptoms:See Associated Diagnosis.   COMPARISON: None.   ACCESSION NUMBER(S): [id number]   ORDERING CLINICIAN: CHAVEZ ZHENG   TECHNIQUE: Helical data acquisition of the chest was obtained without IV contrast material.  Images were reformatted in axial, coronal, and sagittal planes.   FINDINGS: LUNGS AND AIRWAYS: The trachea and central airways are patent. No endobronchial lesion is seen.   There is mild subpleural reticulation consistent with smoking-related changes. There is bibasilar atelectatic changes most likely postinfectious/inflammatory. There is a calcified left lower lobe granuloma. There is a 2 mm subpleural right middle lobe parenchymal lung nodule. There are no suspicious lung nodules.There is no focal consolidation, pleural effusion, or pneumothorax.       MEDIASTINUM AND PHYLLIS, LOWER NECK AND AXILLA: There is mild thyromegaly with right thyroid lobe calcifications. Scattered mediastinal lymph nodes are felt to be reactive/inflammatory in nature. Calcified hilar and paraesophageal lymph nodes. Esophagus appears within normal limits as seen.   HEART AND VESSELS: The thoracic aorta normal in course and caliber. Main pulmonary artery and its branches are  normal in caliber. Estimated coronary artery calcium score is 0. The cardiac chambers are not enlarged. There is no pericardial effusion seen.   UPPER ABDOMEN: Bilateral fat containing adrenal adenomas.       CHEST WALL AND OSSEOUS STRUCTURES: Chest wall is within normal limits. No acute osseous pathology.There are no suspicious osseous lesions.       1.  Few small bilateral noncalcified pulmonary nodules measuring up to 5 mm, likely benign. Continued screening with low-dose noncontrast chest CT in 12 months (from current date) is recommended. 2. Estimated coronary artery calcium score is 0* which correlates with at least 0th percentile rank as compared to matched MATA-study subjects(https://[email or web address]-nhlbi.org/Calcium/input.aspx).   LUNG RADS CATEGORY: Lung Rad: Lung-RADS 2 (Benign Appearance or Indolent Behavior)   Recommendation: Continue annual screening with Low Dose Chest CT in 12 months, recommended as per American College of Radiology Guidelines Lung-RADS Version 2022.       *The patient's CAC score was measured with an FDA-cleared AI tool that correlates well with traditional methods. However, due to the non-gated CT scan and new algorithm, AI-powered scores should not replace traditional cardiovascular risk assessment. 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  Her neurological examination is mildly abnormal and noted above.  The differential diagnosis includes for difficulties with ambulation includes cerebral infarction, TIA, cervical  stenosis and lumbar stenosis.    Plan: The patient needs an MRI of the brain, cervical spine and lumbar spine without contrast.  The patient needs an MRA of the brain as well as an MRI of the neck without contrast with T1 fat saturation images to rule out a right vertebral artery dissection.  The patient needs an echocardiogram, lipid panel and hemoglobin A1c.  The patient needs an orthostatic blood pressure and heart rate checked.  The patient will need a Zio patch for 2 weeks.  The patient will need to be on Plavix 75 mg a day and aspirin 81 mg a day for 21 days.  After 21 days, the Plavix can be discontinued and aspirin can be continued at 81 mg a day.  The patient needs to continue stroke risk factor modification.   The patient needs a PT, OT, social service, rehab and speech therapy consult.  The patient needs DVT prophylaxis.  The patient needs neurochecks as per protocol.  I will send the note to Dr. Hui.  Thank you very much for sending me this very interesting consultation.  I discussed all these issues in detail with the patient and answered all their questions.  I will continue to follow the patient while they are in the hospital.  The patient needs follow-up with their primary care doctor within 2 weeks of discharge.  On discharge, the patient will follow up with me in the office in 4 months.

## 2024-10-15 NOTE — ED PROVIDER NOTES
HPI   Chief Complaint   Patient presents with    Numbness    Vomiting     Patient arrives from home with complaints of nausea and vomiting since last hs.  Patient also states she has right sided facial numbness since 9am.       69-year-old female presents with nausea vomiting, right facial paresthesia, and gait instability.  States she has had nausea and vomiting that began last night.  Today since 9 AM she has had right facial paresthesia.  States she feels like there is Novocain that was injected on the right side of her face.  She still has some sensation.  Ongoing intermittent nausea and vomiting.  No vision changes.  She normally ambulates independently.  She uses a walker when she is out of the house.  She is now unable to stand or walk on her own.  That also began today.  Not on oral anticoagulants.  She fell twice today.      History provided by:  Patient and medical records          Patient History   Past Medical History:   Diagnosis Date    Other specified disorders of adrenal gland (Multi) 06/25/2018    Adrenal nodule    Personal history of other diseases of the respiratory system     History of bronchitis    Personal history of other diseases of the respiratory system     History of pharyngitis    Personal history of other infectious and parasitic diseases     History of viral infection    Personal history of other mental and behavioral disorders     History of depression    Personal history of other mental and behavioral disorders     History of depression    Personal history of other specified conditions     History of diarrhea    Personal history of other specified conditions     History of abdominal pain    Syncope and collapse     Vasovagal syncope    Unspecified osteoarthritis, unspecified site     Osteoarthritis     Past Surgical History:   Procedure Laterality Date    HYSTERECTOMY  11/28/2015    Hysterectomy    KNEE SURGERY  05/30/2018    Knee Surgery    OTHER SURGICAL HISTORY  11/28/2015     Tympanic Membrane Repair     No family history on file.  Social History     Tobacco Use    Smoking status: Every Day     Current packs/day: 0.25     Types: Cigarettes    Smokeless tobacco: Never   Substance Use Topics    Alcohol use: Never    Drug use: Never       Physical Exam   ED Triage Vitals [10/15/24 1451]   Temperature Heart Rate Respirations BP   36.1 °C (97 °F) 67 18 152/80      Pulse Ox Temp Source Heart Rate Source Patient Position   99 % Temporal Monitor --      BP Location FiO2 (%)     Right arm --       Physical Exam  Vitals and nursing note reviewed.   Constitutional:       General: She is not in acute distress.     Appearance: She is well-developed.   HENT:      Head: Normocephalic and atraumatic.   Eyes:      Conjunctiva/sclera: Conjunctivae normal.   Cardiovascular:      Rate and Rhythm: Normal rate and regular rhythm.      Heart sounds: No murmur heard.  Pulmonary:      Effort: Pulmonary effort is normal. No respiratory distress.      Breath sounds: Normal breath sounds.   Abdominal:      Palpations: Abdomen is soft.      Tenderness: There is no abdominal tenderness.   Musculoskeletal:         General: No swelling.      Cervical back: Neck supple.   Skin:     General: Skin is warm and dry.      Capillary Refill: Capillary refill takes less than 2 seconds.   Neurological:      Mental Status: She is alert.      GCS: GCS eye subscore is 4. GCS verbal subscore is 5. GCS motor subscore is 6.      Cranial Nerves: Cranial nerves 2-12 are intact. No cranial nerve deficit or facial asymmetry.      Sensory: No sensory deficit.      Coordination: Coordination abnormal.      Gait: Gait abnormal.      Comments: NIH stroke scale = 2 (2 points for limb ataxia.  Abnormal heel-to-shin bilaterally)    Sensation intact to bilateral face arm or legs.  States that it feels the same when I touch both sides of her face, but she has mildly decreased sensation on the right side of the face.  When moving from wheelchair to  bed she requires full assistance and has a shuffling gait.  She is unable to stand or walk on her own.   Psychiatric:         Mood and Affect: Mood normal.           ED Course & MDM   ED Course as of 10/15/24 1824   Tue Oct 15, 2024   1453 Right facial paresthesias since 9 AM she can feel touch on the right side of her face.  2 falls today.  NIH stroke scale = 2 [BT]   1531 77-year-old female with facial paresthesia, nausea vomiting, gait instability.  Placed on stroke care path.  Stroke alert called from triage.  Full NIH completed.  She has an NIH stroke scale 2 for ataxia bilateral lower extremity.  CT head CTA head/neck.  Labs.  She did vomit here.  Zofran for nausea. [BT]   1554 Spoke with Dr. Roa.  Not TNK candidate.  No LVO on CTA.  ? Right vertebral artery dissection.  Will check MRI head, neck, spine. [BT]   1633 ECG 12 lead  EKG interpreted by me shows sinus rhythm with rate of 60.  Left axis deviation.  Nonspecific ST-T changes.  No acute injury pattern. [BT]   1823 Labs unremarkable. [BT]   1823 Spoke with medicine hospitalist Dr. Seo who will hospitalize under medicine for admit with telemetry with neurology consult for facial paresthesia, gait instability, ataxia,  Right vertebral artery occlusion [BT]      ED Course User Index  [BT] Carlos Hui DO         Diagnoses as of 10/15/24 1824   Facial paresthesia   Gait instability   Nausea and vomiting, unspecified vomiting type   Fall, initial encounter   Cereb infrc due to unsp occls or stenos of right verteb art (Multi)                 No data recorded     Adam Coma Scale Score: 15 (10/15/24 1452 : Neil Hubbard RN)       NIH Stroke Scale: 2 (10/15/24 1530 : Eula Proctor RN)                   Medical Decision Making      Procedure  Critical Care    Performed by: Carlos Hui DO  Authorized by: Carlos Hui DO    Critical care provider statement:     Critical care time (minutes):  35    Critical care start time:   10/15/2024 3:30 PM    Critical care end time:  10/15/2024 4:05 PM    Critical care was necessary to treat or prevent imminent or life-threatening deterioration of the following conditions:  CNS failure or compromise    Critical care was time spent personally by me on the following activities:  Ordering and review of radiographic studies, ordering and review of laboratory studies, discussions with primary provider, pulse oximetry, evaluation of patient's response to treatment and examination of patient    Care discussed with: admitting provider         Carlos Hui DO  10/15/24 1821

## 2024-10-15 NOTE — PROGRESS NOTES
Pharmacy Medication History Review    Jahaira Moore is a 69 y.o. female admitted for No Principal Problem: There is no principal problem currently on the Problem List. Please update the Problem List and refresh.. Pharmacy reviewed the patient's [id number] medications and allergies for accuracy.    The list below reflectives the updated PTA list. Please review each medication in order reconciliation for additional clarification and justification.  Prior to Admission medications    Medication Sig Start Date End Date Authorizing Provider   ARIPiprazole (Abilify) 2 mg tablet Take 1 tablet (2 mg) by mouth once daily.   Historical Provider, MD   buPROPion XL (Wellbutrin XL) 300 mg 24 hr tablet Take 1 tablet (300 mg) by mouth once daily. Do not crush, chew, or split.   Historical Provider, MD   calcium carbonate-vitamin D3 500 mg-5 mcg (200 unit) tablet Take 1 tablet by mouth 2 times a day.   Historical Provider, MD   chlorthalidone (Hygroton) 25 mg tablet Take 1 tablet (25 mg) by mouth once daily.   Rupesh BHAKTA DO   cholecalciferol (Vitamin D-3) 25 MCG (1000 UT) capsule Take 1 capsule (25 mcg) by mouth once daily.   Historical Provider, MD   DULoxetine (Cymbalta) 30 mg DR capsule Take 1 capsule (30 mg) by mouth once daily. With 60mg capsule   Historical Provider, MD   DULoxetine (Cymbalta) 60 mg DR capsule Take 1 capsule (60 mg) by mouth once daily. With 30mg capsule   Historical Provider, MD   gabapentin (Neurontin) 300 mg capsule Take 1 capsule (300 mg) by mouth 3 times a day.   Historical Provider, MD   magnesium oxide 500 mg magnesium tablet Take 1 tablet (500 mg) by mouth once daily at bedtime.   Historical Provider, MD   pantoprazole (ProtoNix) 40 mg EC tablet TAKE 1 TABLET BY MOUTH DAILY   Rupesh BHAKTA DO   predniSONE (Deltasone) 1 mg tablet Take 1 tablet (1 mg) by mouth once daily. With 5mg tablet   Historical Provider, MD   predniSONE (Deltasone) 5 mg tablet Take 1 tablet (5 mg) by mouth once  daily. With 1mg tablet   Historical Provider, MD   traZODone (Desyrel) 50 mg tablet Take 1 tablet (50 mg) by mouth once daily at bedtime.   Historical Provider, MD   sarilumab (Kevzara) 200 mg/1.14 mL injection Inject 1.14 mL (200 mg) under the skin every 14 (fourteen) days.   Historical Provider, MD        The list below reflectives the updated allergy list. Please review each documented allergy for additional clarification and justification.  Allergies  Reviewed by Mckenna Yen on 10/15/2024        Severity Reactions Comments    Latex Low Itching             Below are additional concerns with the patient's PTA list.      Mckenna Yen

## 2024-10-15 NOTE — ED TRIAGE NOTES
Patient arrives from home with complaints of nausea and vomiting since last hs.  Patient also states she has right sided facial numbness since 9am.

## 2024-10-16 ENCOUNTER — APPOINTMENT (OUTPATIENT)
Dept: CARDIOLOGY | Facility: HOSPITAL | Age: 69
End: 2024-10-16
Payer: MEDICARE

## 2024-10-16 ENCOUNTER — APPOINTMENT (OUTPATIENT)
Dept: VASCULAR MEDICINE | Facility: HOSPITAL | Age: 69
End: 2024-10-16
Payer: MEDICARE

## 2024-10-16 LAB
ALBUMIN SERPL BCP-MCNC: 3.8 G/DL (ref 3.4–5)
ANION GAP SERPL CALC-SCNC: 12 MMOL/L (ref 10–20)
AORTIC VALVE MEAN GRADIENT: 6 MMHG
AORTIC VALVE PEAK VELOCITY: 1.67 M/S
ATRIAL RATE: 60 BPM
AV PEAK GRADIENT: 11.2 MMHG
AVA (PEAK VEL): 2.27 CM2
AVA (VTI): 2.45 CM2
BUN SERPL-MCNC: 24 MG/DL (ref 6–23)
CALCIUM SERPL-MCNC: 9.3 MG/DL (ref 8.6–10.3)
CHLORIDE SERPL-SCNC: 103 MMOL/L (ref 98–107)
CO2 SERPL-SCNC: 29 MMOL/L (ref 21–32)
CREAT SERPL-MCNC: 1.07 MG/DL (ref 0.5–1.05)
EGFRCR SERPLBLD CKD-EPI 2021: 56 ML/MIN/1.73M*2
EJECTION FRACTION APICAL 4 CHAMBER: 57
EJECTION FRACTION: 71 %
ERYTHROCYTE [DISTWIDTH] IN BLOOD BY AUTOMATED COUNT: 12.4 % (ref 11.5–14.5)
EST. AVERAGE GLUCOSE BLD GHB EST-MCNC: 97 MG/DL
GLUCOSE SERPL-MCNC: 91 MG/DL (ref 74–99)
HBA1C MFR BLD: 5 %
HCT VFR BLD AUTO: 38 % (ref 36–46)
HGB BLD-MCNC: 12.9 G/DL (ref 12–16)
HOLD SPECIMEN: NORMAL
LEFT ATRIUM VOLUME AREA LENGTH INDEX BSA: 23.9 ML/M2
LEFT VENTRICLE INTERNAL DIMENSION DIASTOLE: 2.6 CM (ref 3.5–6)
LEFT VENTRICULAR OUTFLOW TRACT DIAMETER: 1.8 CM
MAGNESIUM SERPL-MCNC: 2.33 MG/DL (ref 1.6–2.4)
MCH RBC QN AUTO: 33.7 PG (ref 26–34)
MCHC RBC AUTO-ENTMCNC: 33.9 G/DL (ref 32–36)
MCV RBC AUTO: 99 FL (ref 80–100)
MITRAL VALVE E/A RATIO: 0.58
NRBC BLD-RTO: 0 /100 WBCS (ref 0–0)
P AXIS: 14 DEGREES
P OFFSET: 211 MS
P ONSET: 161 MS
PHOSPHATE SERPL-MCNC: 3.1 MG/DL (ref 2.5–4.9)
PLATELET # BLD AUTO: 243 X10*3/UL (ref 150–450)
POTASSIUM SERPL-SCNC: 3.1 MMOL/L (ref 3.5–5.3)
PR INTERVAL: 126 MS
Q ONSET: 224 MS
QRS COUNT: 10 BEATS
QRS DURATION: 92 MS
QT INTERVAL: 494 MS
QTC CALCULATION(BAZETT): 494 MS
QTC FREDERICIA: 494 MS
R AXIS: -29 DEGREES
RBC # BLD AUTO: 3.83 X10*6/UL (ref 4–5.2)
RIGHT VENTRICLE FREE WALL PEAK S': 11.9 CM/S
RIGHT VENTRICLE PEAK SYSTOLIC PRESSURE: 27.4 MMHG
SODIUM SERPL-SCNC: 141 MMOL/L (ref 136–145)
T AXIS: 21 DEGREES
T OFFSET: 471 MS
TRICUSPID ANNULAR PLANE SYSTOLIC EXCURSION: 1.9 CM
VENTRICULAR RATE: 60 BPM
WBC # BLD AUTO: 8.5 X10*3/UL (ref 4.4–11.3)

## 2024-10-16 PROCEDURE — 36415 COLL VENOUS BLD VENIPUNCTURE: CPT | Performed by: INTERNAL MEDICINE

## 2024-10-16 PROCEDURE — 99222 1ST HOSP IP/OBS MODERATE 55: CPT | Performed by: NURSE PRACTITIONER

## 2024-10-16 PROCEDURE — 9420000001 HC RT PATIENT EDUCATION 5 MIN

## 2024-10-16 PROCEDURE — 99232 SBSQ HOSP IP/OBS MODERATE 35: CPT | Performed by: INTERNAL MEDICINE

## 2024-10-16 PROCEDURE — 2500000001 HC RX 250 WO HCPCS SELF ADMINISTERED DRUGS (ALT 637 FOR MEDICARE OP): Performed by: INTERNAL MEDICINE

## 2024-10-16 PROCEDURE — 93880 EXTRACRANIAL BILAT STUDY: CPT

## 2024-10-16 PROCEDURE — 85027 COMPLETE CBC AUTOMATED: CPT | Performed by: INTERNAL MEDICINE

## 2024-10-16 PROCEDURE — 93306 TTE W/DOPPLER COMPLETE: CPT

## 2024-10-16 PROCEDURE — 97165 OT EVAL LOW COMPLEX 30 MIN: CPT | Mod: GO

## 2024-10-16 PROCEDURE — 93306 TTE W/DOPPLER COMPLETE: CPT | Performed by: INTERNAL MEDICINE

## 2024-10-16 PROCEDURE — 83735 ASSAY OF MAGNESIUM: CPT | Performed by: INTERNAL MEDICINE

## 2024-10-16 PROCEDURE — 94760 N-INVAS EAR/PLS OXIMETRY 1: CPT

## 2024-10-16 PROCEDURE — 2500000004 HC RX 250 GENERAL PHARMACY W/ HCPCS (ALT 636 FOR OP/ED): Performed by: INTERNAL MEDICINE

## 2024-10-16 PROCEDURE — 93880 EXTRACRANIAL BILAT STUDY: CPT | Performed by: INTERNAL MEDICINE

## 2024-10-16 PROCEDURE — 97161 PT EVAL LOW COMPLEX 20 MIN: CPT | Mod: GP

## 2024-10-16 PROCEDURE — 2500000002 HC RX 250 W HCPCS SELF ADMINISTERED DRUGS (ALT 637 FOR MEDICARE OP, ALT 636 FOR OP/ED): Performed by: INTERNAL MEDICINE

## 2024-10-16 PROCEDURE — 80069 RENAL FUNCTION PANEL: CPT | Performed by: INTERNAL MEDICINE

## 2024-10-16 PROCEDURE — 99233 SBSQ HOSP IP/OBS HIGH 50: CPT | Performed by: PSYCHIATRY & NEUROLOGY

## 2024-10-16 PROCEDURE — 1200000002 HC GENERAL ROOM WITH TELEMETRY DAILY

## 2024-10-16 RX ORDER — PREDNISONE 5 MG/1
5 TABLET ORAL DAILY
Status: DISCONTINUED | OUTPATIENT
Start: 2024-10-16 | End: 2024-10-18 | Stop reason: HOSPADM

## 2024-10-16 RX ORDER — TRAZODONE HYDROCHLORIDE 50 MG/1
50 TABLET ORAL NIGHTLY
Status: DISCONTINUED | OUTPATIENT
Start: 2024-10-16 | End: 2024-10-18 | Stop reason: HOSPADM

## 2024-10-16 RX ORDER — POTASSIUM CHLORIDE 20 MEQ/1
40 TABLET, EXTENDED RELEASE ORAL ONCE
Status: COMPLETED | OUTPATIENT
Start: 2024-10-16 | End: 2024-10-16

## 2024-10-16 RX ORDER — PANTOPRAZOLE SODIUM 40 MG/1
40 TABLET, DELAYED RELEASE ORAL DAILY
Status: DISCONTINUED | OUTPATIENT
Start: 2024-10-16 | End: 2024-10-18 | Stop reason: HOSPADM

## 2024-10-16 RX ORDER — BUPROPION HYDROCHLORIDE 150 MG/1
300 TABLET ORAL DAILY
Status: DISCONTINUED | OUTPATIENT
Start: 2024-10-16 | End: 2024-10-18 | Stop reason: HOSPADM

## 2024-10-16 RX ORDER — GABAPENTIN 300 MG/1
300 CAPSULE ORAL 3 TIMES DAILY
Status: DISCONTINUED | OUTPATIENT
Start: 2024-10-16 | End: 2024-10-18 | Stop reason: HOSPADM

## 2024-10-16 RX ORDER — PREDNISONE 1 MG/1
1 TABLET ORAL DAILY
Status: DISCONTINUED | OUTPATIENT
Start: 2024-10-16 | End: 2024-10-18 | Stop reason: HOSPADM

## 2024-10-16 ASSESSMENT — ENCOUNTER SYMPTOMS
ENDOCRINE NEGATIVE: 1
MUSCULOSKELETAL NEGATIVE: 1
VOMITING: 1
NAUSEA: 1
NUMBNESS: 1
EYES NEGATIVE: 1
CONSTITUTIONAL NEGATIVE: 1
WEAKNESS: 1
HEMATOLOGIC/LYMPHATIC NEGATIVE: 1
PSYCHIATRIC NEGATIVE: 1

## 2024-10-16 ASSESSMENT — COGNITIVE AND FUNCTIONAL STATUS - GENERAL
DRESSING REGULAR LOWER BODY CLOTHING: A LOT
STANDING UP FROM CHAIR USING ARMS: A LOT
MOBILITY SCORE: 15
MOBILITY SCORE: 21
CLIMB 3 TO 5 STEPS WITH RAILING: TOTAL
WALKING IN HOSPITAL ROOM: A LITTLE
CLIMB 3 TO 5 STEPS WITH RAILING: A LITTLE
DAILY ACTIVITIY SCORE: 15
TURNING FROM BACK TO SIDE WHILE IN FLAT BAD: A LITTLE
WALKING IN HOSPITAL ROOM: A LOT
HELP NEEDED FOR BATHING: A LOT
DRESSING REGULAR UPPER BODY CLOTHING: A LITTLE
PERSONAL GROOMING: A LITTLE
EATING MEALS: A LITTLE
TOILETING: A LOT
DAILY ACTIVITIY SCORE: 24
MOVING TO AND FROM BED TO CHAIR: A LITTLE
STANDING UP FROM CHAIR USING ARMS: A LITTLE

## 2024-10-16 ASSESSMENT — PAIN SCALES - GENERAL
PAINLEVEL_OUTOF10: 6
PAINLEVEL_OUTOF10: 0 - NO PAIN

## 2024-10-16 ASSESSMENT — PAIN - FUNCTIONAL ASSESSMENT: PAIN_FUNCTIONAL_ASSESSMENT: 0-10

## 2024-10-16 NOTE — CONSULTS
Consults  Vascular Surgery  Reason For Consult  Vertebral artery dissection    History Of Present Illness  Jahaira Moore is a 69 y.o. female presenting  to [facility] on 10/15/2024 with a chief complaint of nausea vomiting right facial paresthesia and gait instability which started on 10/14/2024 at approximately 8 PM.  Her symptoms started the night before admission, and during the day she had right facial paresthesia.  She is having numbness as if she had Novocain injected to her right side of the face.  She was also having intermittent nausea and vomiting. She lost a lot of weight recently. She denies any headaches, change in vision, difficulty swallowing, change in hearing, fever/chills, chest pain, SOB, palpitation, cough, abdominal pain, change in bowel habits/urine, joint pain/swelling, weakness in any of the extremities or swelling, insomnia or any symptoms of depression.  I evaluated this patient at bedside and obtained information from chart review and patient interview.  Patient did recently have in the past 3 weeks right upper back tooth removal.  Uncertain of possible trauma from this extraction.     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    Surgical History  She has a past surgical history that includes Hysterectomy (11/28/2015); Other surgical history (11/28/2015); and Knee surgery (05/30/2018).     Social History  She reports that she has been smoking cigarettes.  "She has never used smokeless tobacco. She reports that she does not drink alcohol and does not use drugs.    Family History  No family history on file.     Allergies  Latex    Review of Systems  A 10 point ROS was performed with the patient denying any complaint at this time aside from those listed in the HPI above.  Physical Exam  Constitutional: Well developed , awake/alert/oriented x3, in no distress,  Eyes: Clear sclera  ENMT: mucous membranes are moist, no apparent injury, no lesions seen,   Head/neck: Neck supple, trachea  is midline, no apparent injury, no bruits, no mass, no stridor  Respiratory/thorax: Breath sounds clear and equal bilaterally with good chest expansion, thorax symmetric  Cardiac/Vascular: Regular, rate and rhythm, no murmurs, 2+ radial pulses, palpable bilateral popliteals, DPs and PTs  Gastrointestinal: Nondistended soft nontender, positive bowel sounds, no bruits.  Musculoskeletal: Moves all extremities, limited range of motion , no joint swelling,   Extremities: No cyanosis, no contusions or wounds,   Neurological: Alert and oriented x3, cranial nerves II through XII grossly intact. No focal deficits, face symmetric, no facial drooping or tongue deviation, or dysarthria, normal strength,  sensation intact. Handgrasps equal, push pulls equal no lateralizing symptoms.  Patient does have numbness to right cheek and balance/dizziness  Lymphatic: No significant lymphadenopathy  Skin: Warm and dry, no lesions, no rashes  Psychological: Appropriate mood and behavior  Last Recorded Vitals  Blood pressure 130/74, pulse 58, temperature 36.5 °C (97.7 °F), resp. rate 20, height 1.6 m (5' 3\"), weight 58.1 kg (128 lb), SpO2 93%.       Results for orders placed or performed during the hospital encounter of 10/15/24 (from the past 24 hours)   POCT GLUCOSE   Result Value Ref Range    POCT Glucose 116 (H) 74 - 99 mg/dL   CBC and Auto Differential   Result Value Ref Range    WBC 10.9 4.4 - 11.3 x10*3/uL    " nRBC 0.0 0.0 - 0.0 /100 WBCs    RBC 4.34 4.00 - 5.20 x10*6/uL    Hemoglobin 14.8 12.0 - 16.0 g/dL    Hematocrit 42.5 36.0 - 46.0 %    MCV 98 80 - 100 fL    MCH 34.1 (H) 26.0 - 34.0 pg    MCHC 34.8 32.0 - 36.0 g/dL    RDW 12.3 11.5 - 14.5 %    Platelets 272 150 - 450 x10*3/uL    Neutrophils % 75.1 40.0 - 80.0 %    Immature Granulocytes %, Automated 0.4 0.0 - 0.9 %    Lymphocytes % 14.8 13.0 - 44.0 %    Monocytes % 8.3 2.0 - 10.0 %    Eosinophils % 1.0 0.0 - 6.0 %    Basophils % 0.4 0.0 - 2.0 %    Neutrophils Absolute 8.16 (H) 1.60 - 5.50 x10*3/uL    Immature Granulocytes Absolute, Automated 0.04 0.00 - 0.50 x10*3/uL    Lymphocytes Absolute 1.61 0.80 - 3.00 x10*3/uL    Monocytes Absolute 0.90 (H) 0.05 - 0.80 x10*3/uL    Eosinophils Absolute 0.11 0.00 - 0.40 x10*3/uL    Basophils Absolute 0.04 0.00 - 0.10 x10*3/uL   Comprehensive metabolic panel   Result Value Ref Range    Glucose 123 (H) 74 - 99 mg/dL    Sodium 139 136 - 145 mmol/L    Potassium 3.1 (L) 3.5 - 5.3 mmol/L    Chloride 99 98 - 107 mmol/L    Bicarbonate 32 21 - 32 mmol/L    Anion Gap 11 10 - 20 mmol/L    Urea Nitrogen 29 (H) 6 - 23 mg/dL    Creatinine 1.24 (H) 0.50 - 1.05 mg/dL    eGFR 47 (L) >60 mL/min/1.73m*2    Calcium 10.5 (H) 8.6 - 10.3 mg/dL    Albumin 4.6 3.4 - 5.0 g/dL    Alkaline Phosphatase 55 33 - 136 U/L    Total Protein 6.9 6.4 - 8.2 g/dL    AST 14 9 - 39 U/L    Bilirubin, Total 0.6 0.0 - 1.2 mg/dL    ALT 12 7 - 45 U/L   Troponin I, High Sensitivity   Result Value Ref Range    Troponin I, High Sensitivity 12 0 - 13 ng/L   Protime-INR   Result Value Ref Range    Protime 10.5 9.8 - 12.8 seconds    INR 0.9 0.9 - 1.1   APTT   Result Value Ref Range    aPTT 29 27 - 38 seconds   Lipid Panel   Result Value Ref Range    Cholesterol 180 0 - 199 mg/dL    HDL-Cholesterol 61.5 mg/dL    Cholesterol/HDL Ratio 2.9     LDL Calculated 92 <=99 mg/dL    VLDL 26 0 - 40 mg/dL    Triglycerides 131 0 - 149 mg/dL    Non HDL Cholesterol 119 0 - 149 mg/dL   ECG 12 lead    Result Value Ref Range    Ventricular Rate 60 BPM    Atrial Rate 60 BPM    TX Interval 126 ms    QRS Duration 92 ms    QT Interval 494 ms    QTC Calculation(Bazett) 494 ms    P Axis 14 degrees    R Axis -29 degrees    T Axis 21 degrees    QRS Count 10 beats    Q Onset 224 ms    P Onset 161 ms    P Offset 211 ms    T Offset 471 ms    QTC Fredericia 494 ms   POCT GLUCOSE   Result Value Ref Range    POCT Glucose 96 74 - 99 mg/dL   CBC   Result Value Ref Range    WBC 8.5 4.4 - 11.3 x10*3/uL    nRBC 0.0 0.0 - 0.0 /100 WBCs    RBC 3.83 (L) 4.00 - 5.20 x10*6/uL    Hemoglobin 12.9 12.0 - 16.0 g/dL    Hematocrit 38.0 36.0 - 46.0 %    MCV 99 80 - 100 fL    MCH 33.7 26.0 - 34.0 pg    MCHC 33.9 32.0 - 36.0 g/dL    RDW 12.4 11.5 - 14.5 %    Platelets 243 150 - 450 x10*3/uL   Renal Function Panel   Result Value Ref Range    Glucose 91 74 - 99 mg/dL    Sodium 141 136 - 145 mmol/L    Potassium 3.1 (L) 3.5 - 5.3 mmol/L    Chloride 103 98 - 107 mmol/L    Bicarbonate 29 21 - 32 mmol/L    Anion Gap 12 10 - 20 mmol/L    Urea Nitrogen 24 (H) 6 - 23 mg/dL    Creatinine 1.07 (H) 0.50 - 1.05 mg/dL    eGFR 56 (L) >60 mL/min/1.73m*2    Calcium 9.3 8.6 - 10.3 mg/dL    Phosphorus 3.1 2.5 - 4.9 mg/dL    Albumin 3.8 3.4 - 5.0 g/dL   Magnesium   Result Value Ref Range    Magnesium 2.33 1.60 - 2.40 mg/dL   SST TOP   Result Value Ref Range    Extra Tube Hold for add-ons.    Transthoracic Echo (TTE) Complete   Result Value Ref Range    BSA 1.61 m2       ECG 12 lead    Result Date: 10/16/2024  Normal sinus rhythm Inferior infarct , age undetermined Abnormal ECG No previous ECGs available    MR lumbar spine wo IV contrast    Result Date: 10/15/2024  Interpreted By:  Hyacinth Segura, STUDY: MR LUMBAR SPINE WO IV CONTRAST;  10/15/2024 8:50 pm   INDICATION: Signs/Symptoms:LE weakness/ dizziness.     COMPARISON: MRI of the lumbar spine dated 02/04/2021   ACCESSION NUMBER(S): [id number]   ORDERING CLINICIAN: SERINA PRICE   TECHNIQUE: Sagittal  T1, T2, STIR, axial T1 and T2 weighted images of the lumbar spine were acquired. No intravenous contrast was administered.   FINDINGS: There are 5 lumbar type non rib-bearing vertebral bodies with the lowest well-formed intervertebral disc space labeled L5-S1.   There is a subtle 1-2 mm retrolisthesis of L2 on L3 with a 2-3 mm anterolisthesis of the 3 on L4 and L4 on L5, similar in appearance to prior exam in February of 2021.   In the interim since prior exam in February of 2021, there has been interval worsening of compression deformity along the superior endplate of T12, with STIR hyperintense edema present along the superior endplate of T12 and along superior endplate of L1, new/increased from prior. STIR hyperintense fluid is also present within the intervertebral disc spaces of the T11-T12 and T12-L1, likely reactive/degenerative in etiology.   No compression fractures are identified in the lumbar spine, although STIR hyperintense edema is present along the superior endplates of L1 and L2.   Posterior elements of the lumbar spine do not demonstrate any new signal abnormalities. Multilevel degenerate facet osteoarthropathy is present, worst at L3-L4 and L4-L5.   Multilevel intervertebral disc desiccation is present with severe disc height loss at L1-L2, L2-L3 and L3-L4.   Visualized lower thoracic spinal cord does not demonstrate any intramedullary cord signal changes. Conus medullaris terminates at the level of T12-L1.   T12-L1: Disc osteophyte complex, hypertrophic facet changes and ligamentum flavum thickening somewhat efface the subarachnoid space in the subarticular recesses bilaterally without spinal canal stenosis. Mild-to-moderate bilateral neural foraminal narrowing is present due to hypertrophic facet changes, worse on the left.   L1-L2: Disc osteophyte complex, hypertrophic facet changes and ligamentum flavum thickening somewhat efface the subarachnoid space in the subarticular recesses bilaterally,  right-greater-than-left, without spinal canal stenosis. Moderate bilateral neural foraminal narrowing is present due to hypertrophic facet changes, slightly worse on the left, similar to prior study.   L2-L3: Disc osteophyte complex and ligamentum flavum thickening efface the subarticular recesses bilaterally, right worse than left contributes to mild spinal canal narrowing. Moderate right-sided and moderate to severe left-sided neural foraminal stenosis is present due to uncovertebral and facet joint hypertrophy, similar to prior exam.   L3-L4: Combination of disc osteophyte complex, spondylolisthesis and ligamentum flavum thickening efface the subarticular recesses and cause mild-to-moderate spinal canal stenosis, similar to prior exam. Moderate bilateral neural foraminal stenosis is present due to hypertrophic facet changes and spondylolisthesis, similar to prior study.   L4-L5: Disc osteophyte complex and spondylolisthesis efface the subarticular space and cause moderate spinal canal stenosis, similar to prior study. Moderate bilateral neural foraminal stenosis due to spondylolisthesis and hypertrophic facet changes is also similar to prior exam.   L5-S1: Disc osteophyte complex and hypertrophic facet changes somewhat efface the subarticular space without spinal canal stenosis. Mild bilateral neural foraminal narrowing is present due to hypertrophic facet changes and bulging disc.   Paraspinal soft tissues do not demonstrate any acute abnormality.       1.  New compression deformity is present along the superior endplate of T12 with some STIR hyperintense edema, likely more acute/subacute in etiology. No significant bony retropulsion is present. New STIR hyperintense edema is also present in the superior endplate of L1 and L2, without associated height loss. STIR hyperintense fluid in the T11-T12 and T12-L1 intervertebral disc spaces likely posttraumatic/reactive in etiology. 2. Multilevel degenerative changes  in the lumbar spine with associated spinal canal and neural foraminal stenosis are similar in appearance to prior exam in February of 2021, as detailed above.   MACRO: None   Signed by: Hyacinth Segura 10/15/2024 10:03 PM Dictation workstation:   [id number]    MR cervical spine wo IV contrast    Result Date: 10/15/2024  Interpreted By:  Hyacinth Segura, STUDY: MR CERVICAL SPINE WO IV CONTRAST;  10/15/2024 8:50 pm   INDICATION: Signs/Symptoms:Bilateral LE weaknes.     COMPARISON: MRI of the cervical spine dated 12/29/2020.   ACCESSION NUMBER(S): [id number]   ORDERING CLINICIAN: SERINA PRICE   TECHNIQUE: Sagittal T1, T2, STIR, axial T1 and axial T2 weighted images were acquired through the cervical spine.   FINDINGS: There is a 1-2 mm anterolisthesis present at the level of C2-C3, with a 1-2 mm retrolisthesis of C3 on C4 and C5 on C6. A 1-2 mm anterolisthesis is present at C7-T1. These are similar in appearance to prior exam in December of 2020.   Cervical vertebral body heights are preserved without evidence of new compression fractures. There is increased/new in STIR hyperintense endplate edema present along the inferior endplate of C5 and superior endplate of C6 and C7 in the interim since prior exam in December of 2020. Posterior elements of the cervical spine do not demonstrate any acute signal abnormalities.   Craniocervical junction is intact. Facet joints are preserved without evidence of traumatic subluxation or widening although multilevel degenerate facet osteoarthropathy is again present, most pronounced at C5-C6.   Multilevel intervertebral disc desiccation is present, with severe disc height loss noted at C4-C5 and C5-C6.   Cervical spinal cord does not demonstrate any discrete intramedullary cord signal abnormality. There is some effacement of the cervical spinal cord at the level of C5-C6.   C2-C3: No posterior disc contour abnormality or spinal canal stenosis is identified. No  significant neural foraminal narrowing is present bilaterally.   C3-C4: Disc osteophyte complex effaces the anterior subarachnoid space, without significant spinal canal stenosis. Severe bilateral neural foraminal stenosis is present due to uncovertebral and facet joint hypertrophy.   C4-C5: Disc osteophyte complex somewhat effaces anterior subarachnoid space without spinal canal narrowing. Severe left-sided and mild-to-moderate right-sided neural foraminal stenosis is present due to uncovertebral and facet joint hypertrophy, similar or slightly worsened since prior exam..   C5-C6: Combination of disc osteophyte complex and ligamentum flavum thickening/buckling causes mild-to-moderate spinal canal narrowing with the effacement of the anterior and posterior subarachnoid space and slight deformity of the ventral cervical spinal cord, similar in appearance to prior exam. Severe bilateral neural foraminal stenosis is present due to uncovertebral and facet joint hypertrophy, also similar in appearance to prior study.   C6-C7: Disc osteophyte complex somewhat effaces anterior subarachnoid space without spinal canal stenosis. Moderate to severe bilateral neural foraminal narrowing is present due to uncovertebral and facet joint hypertrophy.   C7-T1: No significant spinal canal stenosis is present. Mild-to-moderate bilateral neural foraminal narrowing is present due to uncovertebral and facet joint hypertrophy, similar in appearance to prior study.   Multilevel spondylolisthesis with disc osteophyte complex and ligamentum flavum thickening/buckling is visualized in the upper thoracic spine, with mild-to-moderate spinal canal narrowing likely present at the level of T2-T3 and T3-T4 but no associated intramedullary cord signal abnormality is identified in the spine.       Multilevel degenerative changes of the cervical spine as detailed above, with severe neural foraminal stenosis present at several levels due to  uncovertebral and facet joint hypertrophy.   MACRO: None   Signed by: Hyacinth Segura 10/15/2024 9:53 PM Dictation workstation:   [id number]    MR brain wo IV contrast    Result Date: 10/15/2024  Interpreted By:  Hyacinth Segura, STUDY: MR BRAIN WO IV CONTRAST; MR ANGIO NECK WO IV CONTRAST; MR NECK SOFT TISSUE ONLY WO IV CONTRAST; MR ANGIO HEAD WO IV CONTRAST;  10/15/2024 8:50 pm   INDICATION: Signs/Symptoms:CVA work up; Signs/Symptoms:Concern for CVA or dissection; Signs/Symptoms:With T1 FAT SAT images to rule out dissection; Signs/Symptoms:concern for stroke or dissection.  Stroke protocol.     COMPARISON: Same day noncontrast CT head; same day CT of the head and neck;   ACCESSION NUMBER(S): [id number]; [id number]; [id number]; [id number]   ORDERING CLINICIAN: SERINA PRICE; TAISHA GARNETT   TECHNIQUE: Axial T2, FLAIR, DWI, gradient echo T2 and  sagittal and coronal T1 weighted images of brain were acquired.   Time-of-flight MRA of the head  and neck was performed. The images were reviewed as source images and maximum intensity projections.   Additionally, fat saturated T1 weighted sequences were obtained throughout the neck under dissection protocol.   FINDINGS: Brain:   Patchy discontinuous foci of diffusion restriction are present within the geographic territory in the inferior medial right cerebellum (series 5, image 49), consistent with acute to early subacute infarct.   No additional areas of diffusion restriction are identified intracranially.   There is no evidence of acute to early subacute intracranial hemorrhage. No midline shift is identified. There may be slight local mass effect present in the right cerebellum at the area of the infarcts without evidence of hemorrhagic transformation. No hemosiderin staining is present.   No ventricular dilatation is identified. Basal cisterns are patent. No extra-axial fluid collections are identified.   Scattered foci of hyperintense FLAIR  and T2 signal are present in the periventricular and subcortical white matter of bilateral cerebral hemispheres are nonspecific, but favored to represent sequela of microvascular disease.   Small amount of mucus/secretions are present in the inferior maxillary sinuses. No abnormal fluid signal is present in the mastoid air cells.   MRA of head:   Intracranial carotid arteries demonstrate symmetric flow enhancement without evidence of occlusion or high-grade stenosis. Carotid terminals are symmetric in appearance.   Anterior cerebral arteries demonstrate symmetric flow enhancement proximally without evidence of occlusion.   No occlusion or high-grade stenosis is present in the M1 segment of the middle cerebral arteries or its more distal visualized M2 or M3 cortical branches.   No expected flow enhancement is identified in the expected location of the right vertebral artery. No occlusion is identified within the visualized left vertebral artery.   Basilar artery is diminutive in appearance, possibly in part due to posterior cerebral arteries being supplied by the posterior communicating arteries bilaterally. No occlusion is identified in the diminutive basilar artery or in the posterior cerebral arteries bilaterally.   MRA of neck:   MRA source images are somewhat degraded by motion.   Visualized common carotid arteries demonstrate symmetric flow enhancement proximally without evidence of occlusion.   Extracranial internal carotid arteries demonstrate somewhat tortuous course bilaterally without evidence of occlusion or stenosis.   There is asymmetrically diminished flow enhancement in the location of the right extracranial vertebral artery proximally compared to the contralateral left side, with no flow enhancement present in the expected location of the vessel distally.   Fat sat T1 weighted images of the neck are degraded by motion, however they demonstrate asymmetrically increased T1 signal in the region of the  lumen of the right vertebral artery compared to the contralateral left side (series 2, image 4) suggestive of underlying hematoma/dissection.       MRI Brain: 1.  Discontinuous nodular foci of diffusion restriction are present within the a geographic territory in the inferior medial right cerebellum, consistent with acute to early subacute infarcts. There is slight local mass effect without evidence of hemorrhagic transformation. 2. No supratentorial acute infarction is identified. 3. Scattered areas of hyperintense FLAIR and T2 signal are present in the periventricular and subcortical white matter of bilateral cerebral hemispheres are nonspecific, although favored to represent chronic sequela of microvascular disease.   MRA: 1.  Asymmetrically diminished flow enhancement is present in the expected location of the extracranial right vertebral artery compared to the contralateral left side proximally, with no flow enhancement identified in the vessel more distally. 2. Fat saturated T1 images of the neck are significantly degraded by motion, however they demonstrate asymmetric increased T1 signal within the lumen of the right vertebral artery compared to the contralateral left side suggestive of an underlying hematoma and dissection. 3. No significant stenosis is present in the carotid arteries in the neck or visualized more distal anterior or posterior intracranial circulation.   MACRO: None   Signed by: Hyacinth Segura 10/15/2024 9:47 PM Dictation workstation:   [id number]    MR neck soft tissue only wo IV contrast    Result Date: 10/15/2024  Interpreted By:  Hyacinth Segura, STUDY: MR BRAIN WO IV CONTRAST; MR ANGIO NECK WO IV CONTRAST; MR NECK SOFT TISSUE ONLY WO IV CONTRAST; MR ANGIO HEAD WO IV CONTRAST;  10/15/2024 8:50 pm   INDICATION: Signs/Symptoms:CVA work up; Signs/Symptoms:Concern for CVA or dissection; Signs/Symptoms:With T1 FAT SAT images to rule out dissection; Signs/Symptoms:concern for  stroke or dissection.  Stroke protocol.     COMPARISON: Same day noncontrast CT head; same day CT of the head and neck;   ACCESSION NUMBER(S): [id number]; [id number]; [id number]; [id number]   ORDERING CLINICIAN: SERINA GARNETT   TECHNIQUE: Axial T2, FLAIR, DWI, gradient echo T2 and  sagittal and coronal T1 weighted images of brain were acquired.   Time-of-flight MRA of the head  and neck was performed. The images were reviewed as source images and maximum intensity projections.   Additionally, fat saturated T1 weighted sequences were obtained throughout the neck under dissection protocol.   FINDINGS: Brain:   Patchy discontinuous foci of diffusion restriction are present within the geographic territory in the inferior medial right cerebellum (series 5, image 49), consistent with acute to early subacute infarct.   No additional areas of diffusion restriction are identified intracranially.   There is no evidence of acute to early subacute intracranial hemorrhage. No midline shift is identified. There may be slight local mass effect present in the right cerebellum at the area of the infarcts without evidence of hemorrhagic transformation. No hemosiderin staining is present.   No ventricular dilatation is identified. Basal cisterns are patent. No extra-axial fluid collections are identified.   Scattered foci of hyperintense FLAIR and T2 signal are present in the periventricular and subcortical white matter of bilateral cerebral hemispheres are nonspecific, but favored to represent sequela of microvascular disease.   Small amount of mucus/secretions are present in the inferior maxillary sinuses. No abnormal fluid signal is present in the mastoid air cells.   MRA of head:   Intracranial carotid arteries demonstrate symmetric flow enhancement without evidence of occlusion or high-grade stenosis. Carotid terminals are symmetric in appearance.   Anterior cerebral arteries demonstrate symmetric flow  enhancement proximally without evidence of occlusion.   No occlusion or high-grade stenosis is present in the M1 segment of the middle cerebral arteries or its more distal visualized M2 or M3 cortical branches.   No expected flow enhancement is identified in the expected location of the right vertebral artery. No occlusion is identified within the visualized left vertebral artery.   Basilar artery is diminutive in appearance, possibly in part due to posterior cerebral arteries being supplied by the posterior communicating arteries bilaterally. No occlusion is identified in the diminutive basilar artery or in the posterior cerebral arteries bilaterally.   MRA of neck:   MRA source images are somewhat degraded by motion.   Visualized common carotid arteries demonstrate symmetric flow enhancement proximally without evidence of occlusion.   Extracranial internal carotid arteries demonstrate somewhat tortuous course bilaterally without evidence of occlusion or stenosis.   There is asymmetrically diminished flow enhancement in the location of the right extracranial vertebral artery proximally compared to the contralateral left side, with no flow enhancement present in the expected location of the vessel distally.   Fat sat T1 weighted images of the neck are degraded by motion, however they demonstrate asymmetrically increased T1 signal in the region of the lumen of the right vertebral artery compared to the contralateral left side (series 2, image 4) suggestive of underlying hematoma/dissection.       MRI Brain: 1.  Discontinuous nodular foci of diffusion restriction are present within the a geographic territory in the inferior medial right cerebellum, consistent with acute to early subacute infarcts. There is slight local mass effect without evidence of hemorrhagic transformation. 2. No supratentorial acute infarction is identified. 3. Scattered areas of hyperintense FLAIR and T2 signal are present in the periventricular  and subcortical white matter of bilateral cerebral hemispheres are nonspecific, although favored to represent chronic sequela of microvascular disease.   MRA: 1.  Asymmetrically diminished flow enhancement is present in the expected location of the extracranial right vertebral artery compared to the contralateral left side proximally, with no flow enhancement identified in the vessel more distally. 2. Fat saturated T1 images of the neck are significantly degraded by motion, however they demonstrate asymmetric increased T1 signal within the lumen of the right vertebral artery compared to the contralateral left side suggestive of an underlying hematoma and dissection. 3. No significant stenosis is present in the carotid arteries in the neck or visualized more distal anterior or posterior intracranial circulation.   MACRO: None   Signed by: Hyacinth Segura 10/15/2024 9:47 PM Dictation workstation:   [id number]    MR angio neck wo IV contrast    Result Date: 10/15/2024  Interpreted By:  Hyacinth Segura, STUDY: MR BRAIN WO IV CONTRAST; MR ANGIO NECK WO IV CONTRAST; MR NECK SOFT TISSUE ONLY WO IV CONTRAST; MR ANGIO HEAD WO IV CONTRAST;  10/15/2024 8:50 pm   INDICATION: Signs/Symptoms:CVA work up; Signs/Symptoms:Concern for CVA or dissection; Signs/Symptoms:With T1 FAT SAT images to rule out dissection; Signs/Symptoms:concern for stroke or dissection.  Stroke protocol.     COMPARISON: Same day noncontrast CT head; same day CT of the head and neck;   ACCESSION NUMBER(S): [id number]; [id number]; [id number]; [id number]   ORDERING CLINICIAN: SERINA GARNETT   TECHNIQUE: Axial T2, FLAIR, DWI, gradient echo T2 and  sagittal and coronal T1 weighted images of brain were acquired.   Time-of-flight MRA of the head  and neck was performed. The images were reviewed as source images and maximum intensity projections.   Additionally, fat saturated T1 weighted sequences were obtained throughout the  neck under dissection protocol.   FINDINGS: Brain:   Patchy discontinuous foci of diffusion restriction are present within the geographic territory in the inferior medial right cerebellum (series 5, image 49), consistent with acute to early subacute infarct.   No additional areas of diffusion restriction are identified intracranially.   There is no evidence of acute to early subacute intracranial hemorrhage. No midline shift is identified. There may be slight local mass effect present in the right cerebellum at the area of the infarcts without evidence of hemorrhagic transformation. No hemosiderin staining is present.   No ventricular dilatation is identified. Basal cisterns are patent. No extra-axial fluid collections are identified.   Scattered foci of hyperintense FLAIR and T2 signal are present in the periventricular and subcortical white matter of bilateral cerebral hemispheres are nonspecific, but favored to represent sequela of microvascular disease.   Small amount of mucus/secretions are present in the inferior maxillary sinuses. No abnormal fluid signal is present in the mastoid air cells.   MRA of head:   Intracranial carotid arteries demonstrate symmetric flow enhancement without evidence of occlusion or high-grade stenosis. Carotid terminals are symmetric in appearance.   Anterior cerebral arteries demonstrate symmetric flow enhancement proximally without evidence of occlusion.   No occlusion or high-grade stenosis is present in the M1 segment of the middle cerebral arteries or its more distal visualized M2 or M3 cortical branches.   No expected flow enhancement is identified in the expected location of the right vertebral artery. No occlusion is identified within the visualized left vertebral artery.   Basilar artery is diminutive in appearance, possibly in part due to posterior cerebral arteries being supplied by the posterior communicating arteries bilaterally. No occlusion is identified in the  diminutive basilar artery or in the posterior cerebral arteries bilaterally.   MRA of neck:   MRA source images are somewhat degraded by motion.   Visualized common carotid arteries demonstrate symmetric flow enhancement proximally without evidence of occlusion.   Extracranial internal carotid arteries demonstrate somewhat tortuous course bilaterally without evidence of occlusion or stenosis.   There is asymmetrically diminished flow enhancement in the location of the right extracranial vertebral artery proximally compared to the contralateral left side, with no flow enhancement present in the expected location of the vessel distally.   Fat sat T1 weighted images of the neck are degraded by motion, however they demonstrate asymmetrically increased T1 signal in the region of the lumen of the right vertebral artery compared to the contralateral left side (series 2, image 4) suggestive of underlying hematoma/dissection.       MRI Brain: 1.  Discontinuous nodular foci of diffusion restriction are present within the a geographic territory in the inferior medial right cerebellum, consistent with acute to early subacute infarcts. There is slight local mass effect without evidence of hemorrhagic transformation. 2. No supratentorial acute infarction is identified. 3. Scattered areas of hyperintense FLAIR and T2 signal are present in the periventricular and subcortical white matter of bilateral cerebral hemispheres are nonspecific, although favored to represent chronic sequela of microvascular disease.   MRA: 1.  Asymmetrically diminished flow enhancement is present in the expected location of the extracranial right vertebral artery compared to the contralateral left side proximally, with no flow enhancement identified in the vessel more distally. 2. Fat saturated T1 images of the neck are significantly degraded by motion, however they demonstrate asymmetric increased T1 signal within the lumen of the right vertebral artery  compared to the contralateral left side suggestive of an underlying hematoma and dissection. 3. No significant stenosis is present in the carotid arteries in the neck or visualized more distal anterior or posterior intracranial circulation.   MACRO: None   Signed by: Hyacinth Segura 10/15/2024 9:47 PM Dictation workstation:   [id number]    MR angio head wo IV contrast    Result Date: 10/15/2024  Interpreted By:  Hyacinth Segura, STUDY: MR BRAIN WO IV CONTRAST; MR ANGIO NECK WO IV CONTRAST; MR NECK SOFT TISSUE ONLY WO IV CONTRAST; MR ANGIO HEAD WO IV CONTRAST;  10/15/2024 8:50 pm   INDICATION: Signs/Symptoms:CVA work up; Signs/Symptoms:Concern for CVA or dissection; Signs/Symptoms:With T1 FAT SAT images to rule out dissection; Signs/Symptoms:concern for stroke or dissection.  Stroke protocol.     COMPARISON: Same day noncontrast CT head; same day CT of the head and neck;   ACCESSION NUMBER(S): [id number]; [id number]; [id number]; [id number]   ORDERING CLINICIAN: SERINA GARNETT   TECHNIQUE: Axial T2, FLAIR, DWI, gradient echo T2 and  sagittal and coronal T1 weighted images of brain were acquired.   Time-of-flight MRA of the head  and neck was performed. The images were reviewed as source images and maximum intensity projections.   Additionally, fat saturated T1 weighted sequences were obtained throughout the neck under dissection protocol.   FINDINGS: Brain:   Patchy discontinuous foci of diffusion restriction are present within the geographic territory in the inferior medial right cerebellum (series 5, image 49), consistent with acute to early subacute infarct.   No additional areas of diffusion restriction are identified intracranially.   There is no evidence of acute to early subacute intracranial hemorrhage. No midline shift is identified. There may be slight local mass effect present in the right cerebellum at the area of the infarcts without evidence of hemorrhagic  transformation. No hemosiderin staining is present.   No ventricular dilatation is identified. Basal cisterns are patent. No extra-axial fluid collections are identified.   Scattered foci of hyperintense FLAIR and T2 signal are present in the periventricular and subcortical white matter of bilateral cerebral hemispheres are nonspecific, but favored to represent sequela of microvascular disease.   Small amount of mucus/secretions are present in the inferior maxillary sinuses. No abnormal fluid signal is present in the mastoid air cells.   MRA of head:   Intracranial carotid arteries demonstrate symmetric flow enhancement without evidence of occlusion or high-grade stenosis. Carotid terminals are symmetric in appearance.   Anterior cerebral arteries demonstrate symmetric flow enhancement proximally without evidence of occlusion.   No occlusion or high-grade stenosis is present in the M1 segment of the middle cerebral arteries or its more distal visualized M2 or M3 cortical branches.   No expected flow enhancement is identified in the expected location of the right vertebral artery. No occlusion is identified within the visualized left vertebral artery.   Basilar artery is diminutive in appearance, possibly in part due to posterior cerebral arteries being supplied by the posterior communicating arteries bilaterally. No occlusion is identified in the diminutive basilar artery or in the posterior cerebral arteries bilaterally.   MRA of neck:   MRA source images are somewhat degraded by motion.   Visualized common carotid arteries demonstrate symmetric flow enhancement proximally without evidence of occlusion.   Extracranial internal carotid arteries demonstrate somewhat tortuous course bilaterally without evidence of occlusion or stenosis.   There is asymmetrically diminished flow enhancement in the location of the right extracranial vertebral artery proximally compared to the contralateral left side, with no flow  enhancement present in the expected location of the vessel distally.   Fat sat T1 weighted images of the neck are degraded by motion, however they demonstrate asymmetrically increased T1 signal in the region of the lumen of the right vertebral artery compared to the contralateral left side (series 2, image 4) suggestive of underlying hematoma/dissection.       MRI Brain: 1.  Discontinuous nodular foci of diffusion restriction are present within the a geographic territory in the inferior medial right cerebellum, consistent with acute to early subacute infarcts. There is slight local mass effect without evidence of hemorrhagic transformation. 2. No supratentorial acute infarction is identified. 3. Scattered areas of hyperintense FLAIR and T2 signal are present in the periventricular and subcortical white matter of bilateral cerebral hemispheres are nonspecific, although favored to represent chronic sequela of microvascular disease.   MRA: 1.  Asymmetrically diminished flow enhancement is present in the expected location of the extracranial right vertebral artery compared to the contralateral left side proximally, with no flow enhancement identified in the vessel more distally. 2. Fat saturated T1 images of the neck are significantly degraded by motion, however they demonstrate asymmetric increased T1 signal within the lumen of the right vertebral artery compared to the contralateral left side suggestive of an underlying hematoma and dissection. 3. No significant stenosis is present in the carotid arteries in the neck or visualized more distal anterior or posterior intracranial circulation.   MACRO: None   Signed by: Hyacinth Segura 10/15/2024 9:47 PM Dictation workstation:   [id number]    CT brain attack angio head and neck W and WO IV contrast    Result Date: 10/15/2024  Interpreted By:  Jerod Gonzalez, STUDY: CT BRAIN ATTACK ANGIO HEAD AND NECK W AND WO IV CONTRAST; ; 10/15/2024 3:15 pm   INDICATION:  Signs/Symptoms:right sided face weakness, NIH stroke scale zero.     COMPARISON: CT head from 10/15/2024.   ACCESSION NUMBER(S): [id number]   ORDERING CLINICIAN: GILDA MCKEON   TECHNIQUE: CTA of the head and neck was performed after administration of 80 mL Omnipaque 350 intravenously. Segmented MIPs are provided.   FINDINGS: CTA neck:   The aortic arch and origins of the great vessels arising from the aortic arch are without luminal narrowing. There is tortuosity of the bilateral common carotid arteries and there is no luminal narrowing.   There is minimal atherosclerotic calcification located within the right carotid bulb. There is 0% luminal narrowing of the bilateral carotid bulbs, according to NASCET criteria. There is no narrowing of the bilateral internal carotid arteries.   Bilateral vertebral arteries arise from the subclavian arteries. There is no narrowing of the left vertebral artery.   Right vertebral artery arises from the right subclavian arteries. Contrast related enhancement is seen within the V1 and V2 segments of the right vertebral artery. There is diminished contrast related enhancement within the V2 V3 junction of the right vertebral artery with diminished contrast related enhancement located within the V3 segment. No striking contrast related enhancement is seen within the superior-most portion of the V3 segment of the right vertebral artery.   CTA head:   There are atherosclerotic calcifications located within the paraophthalmic segments of the bilateral internal carotid arteries causing no luminal narrowing. There is no narrowing of the visualized portions of the bilateral internal carotid arteries. There is no narrowing of the visualized portions of the bilateral anterior or middle cerebral arteries.   The left intradural vertebral artery is somewhat decreased in luminal caliber. The right intradural vertebral artery demonstrates essentially no contrast related enhancement until the  distal most portion at the vertebrobasilar junction. The basilar artery is mildly diffusely decreased in luminal caliber and contains a fenestration within the basilar tip.   There are prominent bilateral posterior communicating arteries which appear to primarily supply the bilateral posterior cerebral arteries. There is no striking narrowing of the visualized portions of the bilateral posterior cerebral arteries.   There are no aneurysms.   Additional findings:   There are osseous degenerative changes of the cervical spine. The thyroid gland is heterogeneous in appearance and there is a partially calcified 1 cm nodule located within the posterior portion of the right thyroid lobe.       CTA neck:   1. Markedly diminished contrast related enhancement located within the V2/V3 segment of the right vertebral artery with gradual loss of contrast related enhancement within the V3 segment. On the prior MRI cervical spine from 2020, flow voids were seen within the right vertebral artery within these regions, therefore these findings could reflect a sequela of an age indeterminate dissection involving the V3 segment of the right vertebral artery. Consider further evaluation with MRI of the brain and dissection protocol.   2. Otherwise, no narrowing of the visualized arteries in the neck.   CTA head:   1. No contrast related enhancement located within the right intradural vertebral artery until the vertebrobasilar junction.   2. Left intradural vertebral artery and basilar artery are diffusely decreased in luminal caliber, possibly developmental variant as there are prominent bilateral posterior communicating arteries which appear to primarily supply the bilateral posterior cerebral arteries.   3. Otherwise, no striking narrowing of the visualized arteries in the head.   This study was interpreted at [facility].     MACRO: Vy Gonzalez discussed the significance and urgency of this  critical finding by telephone with  Dr. Hui on 10/15/2024 at 3:47 pm. (**-RCF-**) Findings:  See findings.   e   Signed by: Jerod Gonzalez 10/15/2024 3:48 PM Dictation workstation:   [id number]    CT brain attack head wo IV contrast    Result Date: 10/15/2024  Interpreted By:  Filomena Aquino, STUDY: CT BRAIN ATTACK HEAD WO IV CONTRAST;  10/15/2024 2:58 pm   INDICATION: Signs/Symptoms:Stroke Evaluation.   COMPARISON: None   ACCESSION NUMBER(S): [id number]   ORDERING CLINICIAN: GILDA MCKEON   TECHNIQUE: Examination was performed in the axial plane with sagittal and coronal reconstructions.   FINDINGS: INTRACRANIAL: There is prominence of the ventricular system and cerebral sulci consistent with cerebral atrophy. No mass or mass effect is identified. There is no hemorrhage or subdural fluid collection. There is no acute infarct.     EXTRACRANIAL: Visualized paranasal sinuses and mastoids are clear.       No acute intracranial pathology.   MACRO: Filomena Aquino discussed the significance and urgency of this critical finding plate secure chat with  GILDA MCKEON on 10/15/2024 at 3:23 pm.  (**-RCF-**) Findings:  See findings.   Signed by: Filomena Aquino 10/15/2024 3:23 PM Dictation workstation:   [id number]    CT lung screening low dose    Result Date: 9/28/2024  Interpreted By:  Neil Lomeli, STUDY: CT LUNG SCREENING LOW DOSE; 9/26/2024 10:51 am   INDICATION: Signs/Symptoms:See Associated Diagnosis.   COMPARISON: None.   ACCESSION NUMBER(S): [id number]   ORDERING CLINICIAN: CHAVEZ ZHENG   TECHNIQUE: Helical data acquisition of the chest was obtained without IV contrast material.  Images were reformatted in axial, coronal, and sagittal planes.   FINDINGS: LUNGS AND AIRWAYS: The trachea and central airways are patent. No endobronchial lesion is seen.   There is mild subpleural reticulation consistent with smoking-related changes. There is bibasilar atelectatic changes most likely  postinfectious/inflammatory. There is a calcified left lower lobe granuloma. There is a 2 mm subpleural right middle lobe parenchymal lung nodule. There are no suspicious lung nodules.There is no focal consolidation, pleural effusion, or pneumothorax.       MEDIASTINUM AND PHYLLIS, LOWER NECK AND AXILLA: There is mild thyromegaly with right thyroid lobe calcifications. Scattered mediastinal lymph nodes are felt to be reactive/inflammatory in nature. Calcified hilar and paraesophageal lymph nodes. Esophagus appears within normal limits as seen.   HEART AND VESSELS: The thoracic aorta normal in course and caliber. Main pulmonary artery and its branches are normal in caliber. Estimated coronary artery calcium score is 0. The cardiac chambers are not enlarged. There is no pericardial effusion seen.   UPPER ABDOMEN: Bilateral fat containing adrenal adenomas.       CHEST WALL AND OSSEOUS STRUCTURES: Chest wall is within normal limits. No acute osseous pathology.There are no suspicious osseous lesions.       1.  Few small bilateral noncalcified pulmonary nodules measuring up to 5 mm, likely benign. Continued screening with low-dose noncontrast chest CT in 12 months (from current date) is recommended. 2. Estimated coronary artery calcium score is 0* which correlates with at least 0th percentile rank as compared to matched MATA-study subjects([email or web address]://[email or web address]-nhlbi.org/Calcium/input.aspx).   LUNG RADS CATEGORY: Lung Rad: Lung-RADS 2 (Benign Appearance or Indolent Behavior)   Recommendation: Continue annual screening with Low Dose Chest CT in 12 months, recommended as per American College of Radiology Guidelines Lung-RADS Version 2022.       *The patient's CAC score was measured with an FDA-cleared AI tool that correlates well with traditional methods. However, due to the non-gated CT scan and new algorithm, AI-powered scores should not replace traditional cardiovascular risk assessment. For further assistance, refer to the  [facility] Cardiovascular Prevention Program via an [facility] referral to 'Cardiology Prevention Program.'   MACRO: None   Signed by: Neil Lomeli 9/28/2024 8:01 AM Dictation workstation:   [id number]     Assessment/Plan  1. 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 Facial paresthesia        3. Gait instability        4. Nausea and vomiting, unspecified vomiting type        5. Fall, initial encounter        6. Cereb infrc due to unsp occls or stenos of right verteb art (Multi)  Vascular US carotid artery duplex bilateral    Vascular US carotid artery duplex bilateral         I evaluated this patient at bedside and did discuss patient case with Dr. Smith.  Dr. Smith and this practitioner did review patient's images from CTA and MRA.  Awaiting carotid duplex which was ordered.  Patient does appear to have a vertebral artery occlusion/dissection with possible hematoma.  Patient did have recent tooth extraction 3 weeks ago right upper back molar.  Uncertain if this has any pertinence to current condition.  If symptoms worsen consider neurointerventional radiologist at [facility] for intervention.  This area is not intervened by vascular surgery.  Recommend conservative medical management: Start/continue daily antiplatelet therapy, maximize statin, and antihypertensive medication for  CVA, vertebral dissection/ stenosis prophylaxis. There was a shared discussion with the patient to continue a lifestyle modification that promotes: The adherence to strict BP and glycemic control, healthy dietary habit changes, incorporation of daily exercise regimen, adherence to all prescription/OTC medication schedules, attendance to all follow-up appointments, cessation from smoking , abstinence from alcohol and illicit drug use if  applicable.  Recommend PT and OT.  Will follow-up patient is hospitalized.    Thank you very much for allowing Vascular Surgery to be involved in the care of your patient sincerely Derek HOLDER .  (This note was generated with voice recognition software and may contain errors including spelling, grammar, syntax and missed recognition of what was dictated, of which may not have been fully corrected)

## 2024-10-16 NOTE — PROGRESS NOTES
10/16/24 1048   Discharge Planning   Living Arrangements Alone   Support Systems Family members   Assistance Needed Independent   Type of Residence Private residence   Number of Stairs to Enter Residence 4   Number of Stairs Within Residence 12   Type of Animals or Pets 1 cat   Who is requesting discharge planning? Provider   Home or Post Acute Services None   Expected Discharge Disposition Home   Does the patient need discharge transport arranged? No     Met with patient at bedside. Introduced self and role as care coordinator. Demographics verified. Patient PCP is [facility]. Patient Insurance is "[employer]. Patient reports she is independent with ADL's. Patient uses a cane in the community. Patient is declining any home going needs at this time.       10/16 1320  PT rec high intensity therapy. Referral placed for [facility] per request of the AR liaison.

## 2024-10-16 NOTE — PROGRESS NOTES
"                                                                 Hospital Medicine Progress Note       Patient Name: Jahaira Moore   YOB: 1955    Subjective:    Jahaira Moore is a 69 y.o.yo female who is hospital day 1     Patient feeling okay. Has persistent numbness to her R face.      Objective:    Recent labs, imaging, vital signs and notes from other providers were reviewed     /74   Pulse 58   Temp 36.5 °C (97.7 °F)   Resp 20   Ht 1.6 m (5' 3\")   Wt 58.1 kg (128 lb)   SpO2 93%   BMI 22.67 kg/m²     Physical Exam:    GENERAL: well developed, non-toxic, NAD, alert & cooperative  HEENT: normocephalic, atraumatic, sclera clear  CARDIAC: regular rate & rhythm, S1/S2  PULMONARY: CTA b/l, no respiratory distress, - wheezes  ABDOMEN: soft, non-tender, non-distended  MSK: no peripheral edema, no obvious deformity  NEURO: A&O X 3, R facial numbness in V2-3 territory, otherwise intact  SKIN: Warm and dry, no lesions, no rashes.  PSYCH: appropriate mood & affect     Assessment/Plan:    CVA  Vertebral Artery Dissection  HTN  RA  Peripheral Neuropathy   CKD III      Cont DAPT, statin, BP in good range. Discussed vertebral artery dissection with Neurology and consulted vascular on their recs. Plan is to cont aggressive secondary prevention.       DVT Prophylaxis: heparin  Code Status: FULL CODE  Disposition: Acute Rehab     Carlos Zimmer, DO  Hospital Medicine    "

## 2024-10-16 NOTE — PROGRESS NOTES
Physical Therapy    Physical Therapy Evaluation    Patient Name: Jahaira Moore  MRN: [id number]  Today's Date: 10/16/2024   Time Calculation  Start Time: 1007  Stop Time: 1017  Time Calculation (min): 10 min  635/635-A    Assessment/Plan   PT Assessment  PT Assessment Results: Impaired balance  Rehab Prognosis: Good  Evaluation/Treatment Tolerance: Patient tolerated treatment well  Medical Staff Made Aware: Yes  Strengths: Ability to acquire knowledge  End of Session Communication: Bedside nurse  Assessment Comment: pt tolerated session. pt required modAx1 with mobility to maintain safety d/t decreased balance. pt would benefit from high int therapy in order to return to PLOF.  End of Session Patient Position: Bed, 2 rail up, Alarm on (call light in reach)  IP OR SWING BED PT PLAN  Inpatient or Swing Bed: Inpatient  PT Plan  Treatment/Interventions: Bed mobility, Gait training, Transfer training, Balance training, Stair training, Therapeutic exercise, Therapeutic activity, Strengthening  PT Plan: Ongoing PT  PT Frequency: 5 times per week  PT Discharge Recommendations: High intensity level of continued care  PT Recommended Transfer Status: Assist x1  PT - OK to Discharge: Yes (once medically cleared)    Subjective     Current Problem:  1. 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 Facial paresthesia        3. Gait instability        4. Nausea and vomiting, unspecified vomiting type        5. Fall, initial encounter        6. Cereb infrc due to unsp occls or stenos of right verteb art (Multi)  Vascular US carotid artery duplex bilateral    Vascular US carotid artery duplex bilateral        Patient Active Problem List   Diagnosis    Routine general medical examination at health care facility    Need for vaccination     Personal history of tobacco use, presenting hazards to health    Screening for cardiovascular condition    Ganglion cyst    Agitated depression (Multi)    Cerebral infarction due to occlusion of right vertebral artery (Multi)    Facial paresthesia       General Visit Information:  General  Reason for Referral: PT eval and tx  Referred By: DO Goran  Past Medical History Relevant to Rehab: Osteoporosis  Co-Treatment: OT  Co-Treatment Reason: to maximize pt safety and mobility  Prior to Session Communication: Bedside nurse  Patient Position Received: Bed, 2 rail up, Alarm on    Home Living/PLOF:   pt lives alone. pt has 2+2 ELI with rail. pt is able to stay on the first floor if needed with a half bath. however pts bed/full bath are on the 2nd. pt has tub shower with GBS and HHS. pt has raised toilet seat. pt drives. pt has had 2 falls yesterday. family is nearby for support. pt uses cane in the community. pt amb without device in home.           Precautions:  Precautions  Precautions Comment: aspiration, falls, OOB with assist  Objective     Pain:  Pain Assessment  Pain Assessment:  (8/10 chronic LBP, RN notified)    Cognition:  Cognition  Orientation Level: Oriented X4    General Assessments:         Sensation  Light Touch: No apparent deficits  Strength  Strength Comments: BLE strength and ROM WFL                   Functional Assessments:     Bed Mobility  Bed Mobility:  (sup<>sit with SBA)  Transfers  Transfer:  (modAx1 for STS from EOB, falling bwds)  Ambulation/Gait Training  Ambulation/Gait Training Performed:  (side stepping to HOB with w/w and modAx1. unsteady. falling bwds)        Outcome Measures:     [facility] Basic Mobility  Turning from your back to your side while in a flat bed without using bedrails: None  Moving from lying on your back to sitting on the side of a flat bed without using bedrails: A little  Moving to and from bed to chair (including a wheelchair): A little  Standing up from a chair using  your arms (e.g. wheelchair or bedside chair): A lot  To walk in hospital room: A lot  Climbing 3-5 steps with railing: Total  Basic Mobility - Total Score: 15                                                             Goals:  Encounter Problems       Encounter Problems (Active)       PT Problem       STG -  Pt will navigate 12 stairs using rail with minAx1  (Progressing)       Start:  10/16/24    Expected End:  10/30/24            STG - Pt will transfer STS with SBA  (Progressing)       Start:  10/16/24    Expected End:  10/30/24            STG - Pt will transition supine <> sitting IND  (Progressing)       Start:  10/16/24    Expected End:  10/30/24            STG - Pt will amb 150' using w/w with SBA  (Progressing)       Start:  10/16/24    Expected End:  10/30/24                 Education Documentation  Mobility Training, taught by Andreina Adan, PT at 10/16/2024  1:13 PM.  Learner: Patient  Readiness: Acceptance  Method: Explanation  Response: Verbalizes Understanding    Education Comments  No comments found.

## 2024-10-16 NOTE — CONSULTS
"Nutrition Initial Assessment:   Nutrition Assessment    Reason for Assessment: Admission nursing screening    Patient is a 69 y.o. female presenting with facial paresthesia.       Nutrition History:  Energy Intake: Fair 50-75 %  Food and Nutrient History: Pt reports decreased appetite x at least 1 year. States she often feels nauseated and has episode of vomiting ~1x/week. Vomiting does not occur after any specific foods or medications that pt is aware of. Does not drink any ONS at home - does not like boost/ensure. Reports constipation due to lower intake of food. Denies issues chewing/swallowing. Lunch tray observed, nearly all of entree consumed but side dishes were untouched. Pt agreeable to trying magic cup while admitted.  Vitamin/Herbal Supplement Use: calcium + D3, vitamin D3, per home med list  Food Allergies/Intolerances:  None  GI Symptoms: Constipation, Nausea, and Vomiting  Oral Problems: None       Anthropometrics:  Height: 160 cm (5' 3\")   Weight: 58.1 kg (128 lb)   BMI (Calculated): 22.68  IBW/kg (Dietitian Calculated): 52.3 kg          Weight History:   Wt Readings from Last 10 Encounters:   10/15/24 58.1 kg (128 lb)   09/05/24 64 kg (141 lb)   07/26/24 65.8 kg (145 lb)   05/22/24 76.7 kg (169 lb)   12/22/23 76.7 kg (169 lb)   09/01/23 73.9 kg (163 lb)   05/10/23 73.9 kg (163 lb)   02/16/23 73.9 kg (163 lb)   02/07/23 77 kg (169 lb 12.1 oz)   01/27/23 69.9 kg (154 lb)      Weight Change %:  Weight History / % Weight Change: Pt reprots 20# wt loss x 1 year. Based on available wt records, pt with 18.6 kg (24%) wt loss x 5 months, 7.7 kg (11.7%) wt loss x 3 months.  Significant Weight Loss: Yes  Interpretation of Weight Loss: >10% in 6 months    Nutrition Focused Physical Exam Findings:    Subcutaneous Fat Loss:   Orbital Fat Pads: Well nourished (slightly bulging fat pads)  Buccal Fat Pads: Well nourished (full, rounded cheeks)  Triceps: Well nourished (ample fat tissue)  Muscle Wasting:  Temporalis: " Mild-Moderate (slight depression)  Pectoralis (Clavicular Region): Mild-Moderate (some protrusion of clavicle)  Deltoid/Trapezius: Mild-Moderate (slight protrusion of acromion process)  Edema:  Edema: none  Physical Findings:  Mouth: Negative  Nails: Negative  Skin: Negative    Nutrition Significant Labs:    Reviewed   Nutrition Specific Medications:  Reviewed     I/O:   Last BM Date:  (UTD PT STATED OVER A WEEK AGO);      Dietary Orders (From admission, onward)       Start     Ordered    10/16/24 1359  Oral nutritional supplements  Until discontinued        Question Answer Comment   Deliver with Dinner    Select supplement: Magic Cup        10/16/24 1359    10/15/24 2102  Adult diet Regular  Diet effective now        Question:  Diet type  Answer:  Regular    10/15/24 2101                     Estimated Needs:   Total Energy Estimated Needs (kCal): 1452 kCal  Method for Estimating Needs: 25 kcal/kg ABW  Total Protein Estimated Needs (g): 58 g  Method for Estimating Needs: 1 g/kg ABW  Total Fluid Estimated Needs (mL): 1452 mL  Method for Estimating Needs: 1 ml/kcal or per MD        Nutrition Diagnosis   Malnutrition Diagnosis  Patient has Malnutrition Diagnosis: Yes  Diagnosis Status: New  Malnutrition Diagnosis: Moderate malnutrition related to chronic disease or condition  As Evidenced by: mild muscle wasting; 24% wt loss x 5 months and 11.7% wt loss x 3 months; reports of decreased appetite and intake meeting <75% of estimated needs x 1 year            Nutrition Interventions/Recommendations         Nutrition Prescription:  Individualized Nutrition Prescription Provided for : Regular diet with ONS        Nutrition Interventions:   Interventions: Meals and snacks, Medical food supplement  Meals and Snacks: General healthful diet  Goal: Consumes 3 meals per day  Medical Food Supplement: Commercial beverage  Goal: Magic cup daily (290 kcal, 9 g protein per serving).         Nutrition Education:   Discussed ways to  increase calories/protein. Discussed ONS options. Educated on importance of adequate intake. Suggested daily multivitamin usage if vomiting worsens or pt is eating even less.        Nutrition Monitoring and Evaluation   Food/Nutrient Related History Monitoring  Monitoring and Evaluation Plan: Energy intake, Amount of food, Fluid intake  Energy Intake: Estimated energy intake  Criteria: Pt meets >75% of estimated energy needs  Fluid Intake: Estimated fluid intake  Criteria: Meets >75% of estimated fluid needs  Amount of Food: Estimated amout of food, Medical food intake  Criteria: Pt consumes >75% of meals and supplements    Body Composition/Growth/Weight History  Monitoring and Evaluation Plan: Weight  Weight: Measured weight  Criteria: Maintains stable weight         Nutrition Focused Physical Findings  Monitoring and Evaluation Plan: Digestive System  Digestive System: Nausea, Vomiting, Constipation  Criteria: Improvement in GI function/symptoms         Time Spent (min): 45 minutes

## 2024-10-16 NOTE — PROGRESS NOTES
Occupational Therapy    Evaluation    Patient Name: Jahaira Moore  MRN: [id number]  Department: [facility]  Room: [address] [place] [address]  Today's Date: 10/16/2024  Time Calculation  Start Time: 1006  Stop Time: 1019  Time Calculation (min): 13 min        Assessment:  End of Session Communication: Bedside nurse  End of Session Patient Position: Bed, 2 rail up, Alarm on (call light in reach)  OT Assessment Results: Decreased ADL status, Decreased upper extremity strength, Decreased safe judgment during ADL, Decreased endurance, Decreased fine motor control, Decreased functional mobility, Decreased gross motor control  Strengths: Insight into problems, Living arrangement secure, Physical health, Premorbid level of function, Support of extended family/friends  Plan:  Treatment Interventions: ADL retraining, UE strengthening/ROM, Functional transfer training, Endurance training, Patient/family training, Equipment evaluation/education, Neuromuscular reeducation, Fine motor coordination activities, Compensatory technique education  OT Frequency: 5 times per week  OT Discharge Recommendations: High intensity level of continued care  OT - OK to Discharge: Yes (once medically appropriate to next level of care)  Treatment Interventions: ADL retraining, UE strengthening/ROM, Functional transfer training, Endurance training, Patient/family training, Equipment evaluation/education, Neuromuscular reeducation, Fine motor coordination activities, Compensatory technique education    Subjective   Current Problem:  1. 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 Facial paresthesia        3. Gait instability        4. Nausea and vomiting, unspecified vomiting type        5. Fall, initial encounter        6. Cereb infrc due to unsp occls or stenos of right verteb  art (Multi)  Vascular US carotid artery duplex bilateral    Vascular US carotid artery duplex bilateral        General:  General  Reason for Referral: OT eval and tx; ADLs. dx; CVA . CT Head non-contrast No acute intracranial pathology. MRI brain; Discontinuous nodular foci of diffusion restriction are present  within the a geographic territory in the inferior medial right  cerebellum, consistent with acute to early subacute infarcts. There  is slight local mass effect without evidence of hemorrhagic  transformation.  MRA head/neck; Asymmetrically diminished flow enhancement is present in the expected location of the extracranial right vertebral artery compared  to the contralateral left side proximally, with no flow enhancement identified in the vessel more distally.  MRI C-spine: multilevel degenerative changes of C-spine with severe foraminal stenosis. MRI L-spine: New compression deformity is present along the superior endplate  of T12 with some STIR hyperintense edema, likely more acute/subacute  in etiology. No significant bony retropulsion is present. neuro consult: cerebral infarction due to occlusion of R vertebral artery  Referred By: Goran  Past Medical History Relevant to Rehab: 69 y.o. female with a PMHx of osteoporosis who presented to [facility] on 10/16/2024 with a chief complaint of nausea vomiting right facial paresthesia and gait instability.  Her symptoms started the night before admission, and during the day she had right facial paresthesia.  She is having numbness as if she had Novocain injected to her right side of the face.  She was also having intermittent nausea and vomiting. She lost a lot of weight recently. She denies any headaches, change in vision, difficulty swallowing, change in hearing, fever/chills, chest pain, SOB, palpitation, cough, abdominal pain, change in bowel habits/urine, joint pain/swelling, weakness in any of the extremities or swelling, insomnia or any symptoms of  depression.  Co-Treatment: PT  Co-Treatment Reason: for safety and to maximize therapeutic perormance  Prior to Session Communication: Bedside nurse  Patient Position Received: Bed, 2 rail up, Alarm on  General Comment: patient pleasant and cooperative to participate  Precautions:  Medical Precautions: Fall precautions (alarms, aspiration precautions, OOB with assist)        Pain:  Pain Assessment  Pain Assessment:  (reports 8/10 chronic low back pain. repositioned EOS for comfort)    Objective   Cognition:  Overall Cognitive Status: Within Functional Limits           Home Living:  Type of Home:  (per patient report upon eval, lives alone, however family is local and very supportive. 2+2 ELI house with HR. patient can stay on 1st floor if needed- has futon to sleep on and BSC on this level. 2nd floor with full bed/bathroom)  Bathroom Shower/Tub:  (tub shower with grab bars and handheld shower)  Bathroom Toilet:  (raised toilet)  Prior Function:  Level of [place]:  (independent in ADLs/IADLs PLOF. drives. sleeps in standard bed. use of cane in community. reports 2 falls yesterday.)    ADL:  LE Dressing Assistance:  (MIN A for balance while patient doffed/donned non skid socks via figure 4 technique seated EOB)       Bed Mobility/Transfers: Bed Mobility  Bed Mobility:  (completed supine <-->sit with SBA with use of bed rail and increased time)    Transfers  Transfer:  (completed STS with MOD A x 1 with WW with LOB and retropulsion noted)      Functional Mobility:  Functional Mobility  Functional Mobility Performed:  (patient engaged in lateral side steps next to bed with MOD A x 1 with WW; retropulsive and LOB noted when completing)     Sensation:  Sensation Comment: reports right facial numbness, however per chart review patient arrived to ED with this symptom  Strength:  Strength Comments: BUE ROM/MMT WFL for patient age    Coordination:  Finger to Nose: Intact       Outcome Measures:[facility] Daily  Activity  Putting on and taking off regular lower body clothing: A lot  Bathing (including washing, rinsing, drying): A lot  Putting on and taking off regular upper body clothing: A little  Toileting, which includes using toilet, bedpan or urinal: A lot  Taking care of personal grooming such as brushing teeth: A little  Eating Meals: A little  Daily Activity - Total Score: 15        Education Documentation  Body Mechanics, taught by Mary Handy OT at 10/16/2024 12:41 PM.  Learner: Patient  Readiness: Acceptance  Method: Explanation  Response: Verbalizes Understanding, Needs Reinforcement    ADL Training, taught by Mary Handy OT at 10/16/2024 12:41 PM.  Learner: Patient  Readiness: Acceptance  Method: Explanation  Response: Verbalizes Understanding, Needs Reinforcement    Education Comments  No comments found.        OP EDUCATION:       Goals:  Encounter Problems       Encounter Problems (Active)       OT Goals       STG- patient will complete LB dressing with SBA with use of ae/ad/dme prn (Progressing)       Start:  10/16/24    Expected End:  10/30/24            STG- patient will complete toileting with SBA with use of ae/ad/dme prn (Progressing)       Start:  10/16/24    Expected End:  10/30/24            STG- patient will complete transfers to/from bed, chair, commode at SBA with use of ae/ad/dme prn (Progressing)       Start:  10/16/24    Expected End:  10/30/24            STG- patient will complete simple mobility with SBA with use of ae/ad/dme prn (Progressing)       Start:  10/16/24    Expected End:  10/30/24            STG- patient will complete grooming at SBA with use of ae/ad/dme prn (Progressing)       Start:  10/16/24    Expected End:  10/30/24

## 2024-10-16 NOTE — NURSING NOTE
Pulmonary Disease Navigator Documentation:    Comments:  This respiratory therapist met with patient to discuss smoking cessation and review educational pamphlet provided. Patient educated on nicotine addiction, dangers associated with smoking, and risk factors associated with exposure to second hand smoke. Discussed patient's behavioral triggers for smoking, behavioral interventions for successful smoking cessation, and support group availability in the area. Patient has been advised that [facility] are smoke-free facilities.

## 2024-10-16 NOTE — CARE PLAN
The patient's goals for the shift include  to void    The clinical goals for the shift include pt to remain HDS and free of falls during this shift    Over the shift, the patient did not make progress toward the following goals. Barriers to progression include n/a. Recommendations to address these barriers include n/a.

## 2024-10-16 NOTE — PROGRESS NOTES
"Jahaira Moore is a 69 y.o. female on day 1 of admission presenting with Facial paresthesia.      Subjective   Patient seen and examined this morning, she states that she is still feeling dizzy however it has improved from yesterday.  She continues to report facial numbness on the right side of her cheek just below the eye.  States that she worked with PT/OT today but was still very unsteady on her feet.  She is amenable to going to acute rehab on the fourth floor.  The patient states that she was able to ambulate to the bathroom using a walker.    Objective     Last Recorded Vitals  Blood pressure 130/74, pulse 58, temperature 36.5 °C (97.7 °F), resp. rate 20, height 1.6 m (5' 3\"), weight 58.1 kg (128 lb), SpO2 93%.    Physical Exam  Neurological Exam  The patient's mental status testing shows that the patient is alert and oriented ×3 with no evidence of any aphasia or dysarthria.  The patient's cranial nerve testing 2, 3, 4, 5, 6, and 7 are all within normal limits.  The patient's motor testing shows normal tone, bulk and power in the upper and lower extremities bilaterally.  The patient's gait is mildly unsteady and she needs assistance to stand.      Relevant Results  Scheduled medications  aspirin, 81 mg, oral, Daily  atorvastatin, 40 mg, oral, Nightly  buPROPion XL, 300 mg, oral, Daily  clopidogrel, 75 mg, oral, Daily  gabapentin, 300 mg, oral, TID  heparin (porcine), 5,000 Units, subcutaneous, q8h  pantoprazole, 40 mg, oral, Daily  perflutren lipid microspheres, 0.5-10 mL of dilution, intravenous, Once in imaging  perflutren protein A microsphere, 0.5 mL, intravenous, Once in imaging  predniSONE, 1 mg, oral, Daily  predniSONE, 5 mg, oral, Daily  sulfur hexafluoride microsphr, 2 mL, intravenous, Once in imaging  traZODone, 50 mg, oral, Nightly      Continuous medications     PRN medications  PRN medications: acetaminophen, hydrALAZINE **FOLLOWED BY** [START ON 10/17/2024] hydrALAZINE, labetaloL, oxygen, " polyethylene glycol  Results for orders placed or performed during the hospital encounter of 10/15/24 (from the past 24 hours)   ECG 12 lead   Result Value Ref Range    Ventricular Rate 60 BPM    Atrial Rate 60 BPM    NJ Interval 126 ms    QRS Duration 92 ms    QT Interval 494 ms    QTC Calculation(Bazett) 494 ms    P Axis 14 degrees    R Axis -29 degrees    T Axis 21 degrees    QRS Count 10 beats    Q Onset 224 ms    P Onset 161 ms    P Offset 211 ms    T Offset 471 ms    QTC Fredericia 494 ms   POCT GLUCOSE   Result Value Ref Range    POCT Glucose 96 74 - 99 mg/dL   CBC   Result Value Ref Range    WBC 8.5 4.4 - 11.3 x10*3/uL    nRBC 0.0 0.0 - 0.0 /100 WBCs    RBC 3.83 (L) 4.00 - 5.20 x10*6/uL    Hemoglobin 12.9 12.0 - 16.0 g/dL    Hematocrit 38.0 36.0 - 46.0 %    MCV 99 80 - 100 fL    MCH 33.7 26.0 - 34.0 pg    MCHC 33.9 32.0 - 36.0 g/dL    RDW 12.4 11.5 - 14.5 %    Platelets 243 150 - 450 x10*3/uL   Renal Function Panel   Result Value Ref Range    Glucose 91 74 - 99 mg/dL    Sodium 141 136 - 145 mmol/L    Potassium 3.1 (L) 3.5 - 5.3 mmol/L    Chloride 103 98 - 107 mmol/L    Bicarbonate 29 21 - 32 mmol/L    Anion Gap 12 10 - 20 mmol/L    Urea Nitrogen 24 (H) 6 - 23 mg/dL    Creatinine 1.07 (H) 0.50 - 1.05 mg/dL    eGFR 56 (L) >60 mL/min/1.73m*2    Calcium 9.3 8.6 - 10.3 mg/dL    Phosphorus 3.1 2.5 - 4.9 mg/dL    Albumin 3.8 3.4 - 5.0 g/dL   Magnesium   Result Value Ref Range    Magnesium 2.33 1.60 - 2.40 mg/dL   SST TOP   Result Value Ref Range    Extra Tube Hold for add-ons.    Transthoracic Echo (TTE) Complete   Result Value Ref Range    BSA 1.61 m2     I did review the images of MRI of the brain as well as MRA of the neck and brain.  I did review the images of the MRI of the cervical and lumbar spines.    Vascular US carotid artery duplex bilateral    Result Date: 10/16/2024           [facility] [address], [place], [place] [address] Tel [phone] and Fax [phone]  Vascular Lab Report [facility] US  CAROTID ARTERY DUPLEX BILATERAL  Patient Name:      DEVANTE JAIN          Chance Physician: [id number] Marlen Kumar MD Study Date:        10/16/2024         Ordering           [id number] MOHAN ANDINO                                       Physician:         ANNE MRN/PID:           [id number]           Technologist:      Lamar Vásquez T Accession#:        [id number]       Technologist 2: Date of Birth/Age: 1955 / 69      Encounter#:        [id number]                    years Gender:            F Admission Status:  Inpatient          Location           [facility]                                       Performed:  Diagnosis/ICD: Other specified symptoms and signs involving the circulatory and                respiratory systems-R09.89 CPT Codes:     46019 Cerebrovascular Carotid Duplex scan complete  CONCLUSIONS: Right Carotid: Findings are consistent with less than 50% stenosis of the right proximal internal carotid artery. Right external carotid artery appears patent with no evidence of stenosis. No evidence of hemodynamically significant stenosis of the right common carotid artery. The right vertebral artery demonstrates a high resistance waveform which may be suggestive of a more distal stenosis or occlusion. There are elevated velocities in the right subclavian artery that are suggestive of disease. Left Carotid: Findings are consistent with less than 50% stenosis of the left proximal internal carotid artery. Left external carotid artery appears patent with no evidence of stenosis. No evidence of hemodynamically significant stenosis of the left common carotid artery. The left vertebral artery is patent with antegrade flow. No evidence of hemodynamically significant stenosis in the left subclavian artery.  Imaging & Doppler Findings: Right Plaque Morph: The proximal right internal carotid artery demonstrates intimal thickening and calcified plaque. The  distal right common carotid artery demonstrates intimal thickening plaque. Left Plaque Morph: The proximal left internal carotid artery demonstrates intimal thickening plaque. The distal left common carotid artery demonstrates intimal thickening plaque.   Right                        Left   PSV      EDV                PSV       cm/s           CCA P    120 cm/s 142 cm/s           CCA D    96 cm/s 78 cm/s  14 cm/s   ICA P    91 cm/s  22 cm/s 92 cm/s  29 cm/s   ICA M    87 cm/s  22 cm/s 81 cm/s  24 cm/s   ICA D    94 cm/s  29 cm/s 87 cm/s             ECA     80 cm/s 77 cm/s  0 cm/s  Vertebral  76 cm/s  19 cm/s 253 cm/s         Subclavian 203 cm/s               Right Left ICA/CCA Ratio  0.5  0.9   [id number] Marlen Kumar MD Electronically signed by [id number] Marlen Kumar MD on 10/16/2024 at 3:08:52 PM  ** Final **     ECG 12 lead    Result Date: 10/16/2024  Normal sinus rhythm Inferior infarct , age undetermined Abnormal ECG No previous ECGs available    MR lumbar spine wo IV contrast    Result Date: 10/15/2024  Interpreted By:  Hyacinth Segura, STUDY: MR LUMBAR SPINE WO IV CONTRAST;  10/15/2024 8:50 pm   INDICATION: Signs/Symptoms:LE weakness/ dizziness.     COMPARISON: MRI of the lumbar spine dated 02/04/2021   ACCESSION NUMBER(S): [id number]   ORDERING CLINICIAN: SERINA PRICE   TECHNIQUE: Sagittal T1, T2, STIR, axial T1 and T2 weighted images of the lumbar spine were acquired. No intravenous contrast was administered.   FINDINGS: There are 5 lumbar type non rib-bearing vertebral bodies with the lowest well-formed intervertebral disc space labeled L5-S1.   There is a subtle 1-2 mm retrolisthesis of L2 on L3 with a 2-3 mm anterolisthesis of the 3 on L4 and L4 on L5, similar in appearance to prior exam in February of 2021.   In the interim since prior exam in February of 2021, there has been interval worsening of compression deformity along the superior endplate of T12, with STIR hyperintense edema  present along the superior endplate of T12 and along superior endplate of L1, new/increased from prior. STIR hyperintense fluid is also present within the intervertebral disc spaces of the T11-T12 and T12-L1, likely reactive/degenerative in etiology.   No compression fractures are identified in the lumbar spine, although STIR hyperintense edema is present along the superior endplates of L1 and L2.   Posterior elements of the lumbar spine do not demonstrate any new signal abnormalities. Multilevel degenerate facet osteoarthropathy is present, worst at L3-L4 and L4-L5.   Multilevel intervertebral disc desiccation is present with severe disc height loss at L1-L2, L2-L3 and L3-L4.   Visualized lower thoracic spinal cord does not demonstrate any intramedullary cord signal changes. Conus medullaris terminates at the level of T12-L1.   T12-L1: Disc osteophyte complex, hypertrophic facet changes and ligamentum flavum thickening somewhat efface the subarachnoid space in the subarticular recesses bilaterally without spinal canal stenosis. Mild-to-moderate bilateral neural foraminal narrowing is present due to hypertrophic facet changes, worse on the left.   L1-L2: Disc osteophyte complex, hypertrophic facet changes and ligamentum flavum thickening somewhat efface the subarachnoid space in the subarticular recesses bilaterally, right-greater-than-left, without spinal canal stenosis. Moderate bilateral neural foraminal narrowing is present due to hypertrophic facet changes, slightly worse on the left, similar to prior study.   L2-L3: Disc osteophyte complex and ligamentum flavum thickening efface the subarticular recesses bilaterally, right worse than left contributes to mild spinal canal narrowing. Moderate right-sided and moderate to severe left-sided neural foraminal stenosis is present due to uncovertebral and facet joint hypertrophy, similar to prior exam.   L3-L4: Combination of disc osteophyte complex, spondylolisthesis  and ligamentum flavum thickening efface the subarticular recesses and cause mild-to-moderate spinal canal stenosis, similar to prior exam. Moderate bilateral neural foraminal stenosis is present due to hypertrophic facet changes and spondylolisthesis, similar to prior study.   L4-L5: Disc osteophyte complex and spondylolisthesis efface the subarticular space and cause moderate spinal canal stenosis, similar to prior study. Moderate bilateral neural foraminal stenosis due to spondylolisthesis and hypertrophic facet changes is also similar to prior exam.   L5-S1: Disc osteophyte complex and hypertrophic facet changes somewhat efface the subarticular space without spinal canal stenosis. Mild bilateral neural foraminal narrowing is present due to hypertrophic facet changes and bulging disc.   Paraspinal soft tissues do not demonstrate any acute abnormality.       1.  New compression deformity is present along the superior endplate of T12 with some STIR hyperintense edema, likely more acute/subacute in etiology. No significant bony retropulsion is present. New STIR hyperintense edema is also present in the superior endplate of L1 and L2, without associated height loss. STIR hyperintense fluid in the T11-T12 and T12-L1 intervertebral disc spaces likely posttraumatic/reactive in etiology. 2. Multilevel degenerative changes in the lumbar spine with associated spinal canal and neural foraminal stenosis are similar in appearance to prior exam in February of 2021, as detailed above.   MACRO: None   Signed by: Hyacinth Segura 10/15/2024 10:03 PM Dictation workstation:   [id number]    MR cervical spine wo IV contrast    Result Date: 10/15/2024  Interpreted By:  Hyacinth Segura, STUDY: MR CERVICAL SPINE WO IV CONTRAST;  10/15/2024 8:50 pm   INDICATION: Signs/Symptoms:Bilateral LE weaknes.     COMPARISON: MRI of the cervical spine dated 12/29/2020.   ACCESSION NUMBER(S): [id number]   ORDERING CLINICIAN: SERINA  TISHMAN   TECHNIQUE: Sagittal T1, T2, STIR, axial T1 and axial T2 weighted images were acquired through the cervical spine.   FINDINGS: There is a 1-2 mm anterolisthesis present at the level of C2-C3, with a 1-2 mm retrolisthesis of C3 on C4 and C5 on C6. A 1-2 mm anterolisthesis is present at C7-T1. These are similar in appearance to prior exam in December of 2020.   Cervical vertebral body heights are preserved without evidence of new compression fractures. There is increased/new in STIR hyperintense endplate edema present along the inferior endplate of C5 and superior endplate of C6 and C7 in the interim since prior exam in December of 2020. Posterior elements of the cervical spine do not demonstrate any acute signal abnormalities.   Craniocervical junction is intact. Facet joints are preserved without evidence of traumatic subluxation or widening although multilevel degenerate facet osteoarthropathy is again present, most pronounced at C5-C6.   Multilevel intervertebral disc desiccation is present, with severe disc height loss noted at C4-C5 and C5-C6.   Cervical spinal cord does not demonstrate any discrete intramedullary cord signal abnormality. There is some effacement of the cervical spinal cord at the level of C5-C6.   C2-C3: No posterior disc contour abnormality or spinal canal stenosis is identified. No significant neural foraminal narrowing is present bilaterally.   C3-C4: Disc osteophyte complex effaces the anterior subarachnoid space, without significant spinal canal stenosis. Severe bilateral neural foraminal stenosis is present due to uncovertebral and facet joint hypertrophy.   C4-C5: Disc osteophyte complex somewhat effaces anterior subarachnoid space without spinal canal narrowing. Severe left-sided and mild-to-moderate right-sided neural foraminal stenosis is present due to uncovertebral and facet joint hypertrophy, similar or slightly worsened since prior exam..   C5-C6: Combination of disc  osteophyte complex and ligamentum flavum thickening/buckling causes mild-to-moderate spinal canal narrowing with the effacement of the anterior and posterior subarachnoid space and slight deformity of the ventral cervical spinal cord, similar in appearance to prior exam. Severe bilateral neural foraminal stenosis is present due to uncovertebral and facet joint hypertrophy, also similar in appearance to prior study.   C6-C7: Disc osteophyte complex somewhat effaces anterior subarachnoid space without spinal canal stenosis. Moderate to severe bilateral neural foraminal narrowing is present due to uncovertebral and facet joint hypertrophy.   C7-T1: No significant spinal canal stenosis is present. Mild-to-moderate bilateral neural foraminal narrowing is present due to uncovertebral and facet joint hypertrophy, similar in appearance to prior study.   Multilevel spondylolisthesis with disc osteophyte complex and ligamentum flavum thickening/buckling is visualized in the upper thoracic spine, with mild-to-moderate spinal canal narrowing likely present at the level of T2-T3 and T3-T4 but no associated intramedullary cord signal abnormality is identified in the spine.       Multilevel degenerative changes of the cervical spine as detailed above, with severe neural foraminal stenosis present at several levels due to uncovertebral and facet joint hypertrophy.   MACRO: None   Signed by: Hyacinth Segura 10/15/2024 9:53 PM Dictation workstation:   [id number]    MR brain wo IV contrast    Result Date: 10/15/2024  Interpreted By:  Hyacinth Segura, STUDY: MR BRAIN WO IV CONTRAST; MR ANGIO NECK WO IV CONTRAST; MR NECK SOFT TISSUE ONLY WO IV CONTRAST; MR ANGIO HEAD WO IV CONTRAST;  10/15/2024 8:50 pm   INDICATION: Signs/Symptoms:CVA work up; Signs/Symptoms:Concern for CVA or dissection; Signs/Symptoms:With T1 FAT SAT images to rule out dissection; Signs/Symptoms:concern for stroke or dissection.  Stroke protocol.      COMPARISON: Same day noncontrast CT head; same day CT of the head and neck;   ACCESSION NUMBER(S): [id number]; [id number]; [id number]; [id number]   ORDERING CLINICIAN: SERINA GARNETT   TECHNIQUE: Axial T2, FLAIR, DWI, gradient echo T2 and  sagittal and coronal T1 weighted images of brain were acquired.   Time-of-flight MRA of the head  and neck was performed. The images were reviewed as source images and maximum intensity projections.   Additionally, fat saturated T1 weighted sequences were obtained throughout the neck under dissection protocol.   FINDINGS: Brain:   Patchy discontinuous foci of diffusion restriction are present within the geographic territory in the inferior medial right cerebellum (series 5, image 49), consistent with acute to early subacute infarct.   No additional areas of diffusion restriction are identified intracranially.   There is no evidence of acute to early subacute intracranial hemorrhage. No midline shift is identified. There may be slight local mass effect present in the right cerebellum at the area of the infarcts without evidence of hemorrhagic transformation. No hemosiderin staining is present.   No ventricular dilatation is identified. Basal cisterns are patent. No extra-axial fluid collections are identified.   Scattered foci of hyperintense FLAIR and T2 signal are present in the periventricular and subcortical white matter of bilateral cerebral hemispheres are nonspecific, but favored to represent sequela of microvascular disease.   Small amount of mucus/secretions are present in the inferior maxillary sinuses. No abnormal fluid signal is present in the mastoid air cells.   MRA of head:   Intracranial carotid arteries demonstrate symmetric flow enhancement without evidence of occlusion or high-grade stenosis. Carotid terminals are symmetric in appearance.   Anterior cerebral arteries demonstrate symmetric flow enhancement proximally without evidence of  occlusion.   No occlusion or high-grade stenosis is present in the M1 segment of the middle cerebral arteries or its more distal visualized M2 or M3 cortical branches.   No expected flow enhancement is identified in the expected location of the right vertebral artery. No occlusion is identified within the visualized left vertebral artery.   Basilar artery is diminutive in appearance, possibly in part due to posterior cerebral arteries being supplied by the posterior communicating arteries bilaterally. No occlusion is identified in the diminutive basilar artery or in the posterior cerebral arteries bilaterally.   MRA of neck:   MRA source images are somewhat degraded by motion.   Visualized common carotid arteries demonstrate symmetric flow enhancement proximally without evidence of occlusion.   Extracranial internal carotid arteries demonstrate somewhat tortuous course bilaterally without evidence of occlusion or stenosis.   There is asymmetrically diminished flow enhancement in the location of the right extracranial vertebral artery proximally compared to the contralateral left side, with no flow enhancement present in the expected location of the vessel distally.   Fat sat T1 weighted images of the neck are degraded by motion, however they demonstrate asymmetrically increased T1 signal in the region of the lumen of the right vertebral artery compared to the contralateral left side (series 2, image 4) suggestive of underlying hematoma/dissection.       MRI Brain: 1.  Discontinuous nodular foci of diffusion restriction are present within the a geographic territory in the inferior medial right cerebellum, consistent with acute to early subacute infarcts. There is slight local mass effect without evidence of hemorrhagic transformation. 2. No supratentorial acute infarction is identified. 3. Scattered areas of hyperintense FLAIR and T2 signal are present in the periventricular and subcortical white matter of bilateral  cerebral hemispheres are nonspecific, although favored to represent chronic sequela of microvascular disease.   MRA: 1.  Asymmetrically diminished flow enhancement is present in the expected location of the extracranial right vertebral artery compared to the contralateral left side proximally, with no flow enhancement identified in the vessel more distally. 2. Fat saturated T1 images of the neck are significantly degraded by motion, however they demonstrate asymmetric increased T1 signal within the lumen of the right vertebral artery compared to the contralateral left side suggestive of an underlying hematoma and dissection. 3. No significant stenosis is present in the carotid arteries in the neck or visualized more distal anterior or posterior intracranial circulation.   MACRO: None   Signed by: Hyacinth Segura 10/15/2024 9:47 PM Dictation workstation:   [id number]    MR neck soft tissue only wo IV contrast    Result Date: 10/15/2024  Interpreted By:  Hyacinth Segura, STUDY: MR BRAIN WO IV CONTRAST; MR ANGIO NECK WO IV CONTRAST; MR NECK SOFT TISSUE ONLY WO IV CONTRAST; MR ANGIO HEAD WO IV CONTRAST;  10/15/2024 8:50 pm   INDICATION: Signs/Symptoms:CVA work up; Signs/Symptoms:Concern for CVA or dissection; Signs/Symptoms:With T1 FAT SAT images to rule out dissection; Signs/Symptoms:concern for stroke or dissection.  Stroke protocol.     COMPARISON: Same day noncontrast CT head; same day CT of the head and neck;   ACCESSION NUMBER(S): [id number]; [id number]; [id number]; [id number]   ORDERING CLINICIAN: SERINA GARNETT   TECHNIQUE: Axial T2, FLAIR, DWI, gradient echo T2 and  sagittal and coronal T1 weighted images of brain were acquired.   Time-of-flight MRA of the head  and neck was performed. The images were reviewed as source images and maximum intensity projections.   Additionally, fat saturated T1 weighted sequences were obtained throughout the neck under dissection protocol.    FINDINGS: Brain:   Patchy discontinuous foci of diffusion restriction are present within the geographic territory in the inferior medial right cerebellum (series 5, image 49), consistent with acute to early subacute infarct.   No additional areas of diffusion restriction are identified intracranially.   There is no evidence of acute to early subacute intracranial hemorrhage. No midline shift is identified. There may be slight local mass effect present in the right cerebellum at the area of the infarcts without evidence of hemorrhagic transformation. No hemosiderin staining is present.   No ventricular dilatation is identified. Basal cisterns are patent. No extra-axial fluid collections are identified.   Scattered foci of hyperintense FLAIR and T2 signal are present in the periventricular and subcortical white matter of bilateral cerebral hemispheres are nonspecific, but favored to represent sequela of microvascular disease.   Small amount of mucus/secretions are present in the inferior maxillary sinuses. No abnormal fluid signal is present in the mastoid air cells.   MRA of head:   Intracranial carotid arteries demonstrate symmetric flow enhancement without evidence of occlusion or high-grade stenosis. Carotid terminals are symmetric in appearance.   Anterior cerebral arteries demonstrate symmetric flow enhancement proximally without evidence of occlusion.   No occlusion or high-grade stenosis is present in the M1 segment of the middle cerebral arteries or its more distal visualized M2 or M3 cortical branches.   No expected flow enhancement is identified in the expected location of the right vertebral artery. No occlusion is identified within the visualized left vertebral artery.   Basilar artery is diminutive in appearance, possibly in part due to posterior cerebral arteries being supplied by the posterior communicating arteries bilaterally. No occlusion is identified in the diminutive basilar artery or in the  posterior cerebral arteries bilaterally.   MRA of neck:   MRA source images are somewhat degraded by motion.   Visualized common carotid arteries demonstrate symmetric flow enhancement proximally without evidence of occlusion.   Extracranial internal carotid arteries demonstrate somewhat tortuous course bilaterally without evidence of occlusion or stenosis.   There is asymmetrically diminished flow enhancement in the location of the right extracranial vertebral artery proximally compared to the contralateral left side, with no flow enhancement present in the expected location of the vessel distally.   Fat sat T1 weighted images of the neck are degraded by motion, however they demonstrate asymmetrically increased T1 signal in the region of the lumen of the right vertebral artery compared to the contralateral left side (series 2, image 4) suggestive of underlying hematoma/dissection.       MRI Brain: 1.  Discontinuous nodular foci of diffusion restriction are present within the a geographic territory in the inferior medial right cerebellum, consistent with acute to early subacute infarcts. There is slight local mass effect without evidence of hemorrhagic transformation. 2. No supratentorial acute infarction is identified. 3. Scattered areas of hyperintense FLAIR and T2 signal are present in the periventricular and subcortical white matter of bilateral cerebral hemispheres are nonspecific, although favored to represent chronic sequela of microvascular disease.   MRA: 1.  Asymmetrically diminished flow enhancement is present in the expected location of the extracranial right vertebral artery compared to the contralateral left side proximally, with no flow enhancement identified in the vessel more distally. 2. Fat saturated T1 images of the neck are significantly degraded by motion, however they demonstrate asymmetric increased T1 signal within the lumen of the right vertebral artery compared to the contralateral left  side suggestive of an underlying hematoma and dissection. 3. No significant stenosis is present in the carotid arteries in the neck or visualized more distal anterior or posterior intracranial circulation.   MACRO: None   Signed by: Hyacinth Segura 10/15/2024 9:47 PM Dictation workstation:   [id number]    MR angio neck wo IV contrast    Result Date: 10/15/2024  Interpreted By:  Hyacinth Segura, STUDY: MR BRAIN WO IV CONTRAST; MR ANGIO NECK WO IV CONTRAST; MR NECK SOFT TISSUE ONLY WO IV CONTRAST; MR ANGIO HEAD WO IV CONTRAST;  10/15/2024 8:50 pm   INDICATION: Signs/Symptoms:CVA work up; Signs/Symptoms:Concern for CVA or dissection; Signs/Symptoms:With T1 FAT SAT images to rule out dissection; Signs/Symptoms:concern for stroke or dissection.  Stroke protocol.     COMPARISON: Same day noncontrast CT head; same day CT of the head and neck;   ACCESSION NUMBER(S): [id number]; [id number]; [id number]; [id number]   ORDERING CLINICIAN: SERINA PRICE; TAISHA GARNETT   TECHNIQUE: Axial T2, FLAIR, DWI, gradient echo T2 and  sagittal and coronal T1 weighted images of brain were acquired.   Time-of-flight MRA of the head  and neck was performed. The images were reviewed as source images and maximum intensity projections.   Additionally, fat saturated T1 weighted sequences were obtained throughout the neck under dissection protocol.   FINDINGS: Brain:   Patchy discontinuous foci of diffusion restriction are present within the geographic territory in the inferior medial right cerebellum (series 5, image 49), consistent with acute to early subacute infarct.   No additional areas of diffusion restriction are identified intracranially.   There is no evidence of acute to early subacute intracranial hemorrhage. No midline shift is identified. There may be slight local mass effect present in the right cerebellum at the area of the infarcts without evidence of hemorrhagic transformation. No hemosiderin staining is  present.   No ventricular dilatation is identified. Basal cisterns are patent. No extra-axial fluid collections are identified.   Scattered foci of hyperintense FLAIR and T2 signal are present in the periventricular and subcortical white matter of bilateral cerebral hemispheres are nonspecific, but favored to represent sequela of microvascular disease.   Small amount of mucus/secretions are present in the inferior maxillary sinuses. No abnormal fluid signal is present in the mastoid air cells.   MRA of head:   Intracranial carotid arteries demonstrate symmetric flow enhancement without evidence of occlusion or high-grade stenosis. Carotid terminals are symmetric in appearance.   Anterior cerebral arteries demonstrate symmetric flow enhancement proximally without evidence of occlusion.   No occlusion or high-grade stenosis is present in the M1 segment of the middle cerebral arteries or its more distal visualized M2 or M3 cortical branches.   No expected flow enhancement is identified in the expected location of the right vertebral artery. No occlusion is identified within the visualized left vertebral artery.   Basilar artery is diminutive in appearance, possibly in part due to posterior cerebral arteries being supplied by the posterior communicating arteries bilaterally. No occlusion is identified in the diminutive basilar artery or in the posterior cerebral arteries bilaterally.   MRA of neck:   MRA source images are somewhat degraded by motion.   Visualized common carotid arteries demonstrate symmetric flow enhancement proximally without evidence of occlusion.   Extracranial internal carotid arteries demonstrate somewhat tortuous course bilaterally without evidence of occlusion or stenosis.   There is asymmetrically diminished flow enhancement in the location of the right extracranial vertebral artery proximally compared to the contralateral left side, with no flow enhancement present in the expected location of  the vessel distally.   Fat sat T1 weighted images of the neck are degraded by motion, however they demonstrate asymmetrically increased T1 signal in the region of the lumen of the right vertebral artery compared to the contralateral left side (series 2, image 4) suggestive of underlying hematoma/dissection.       MRI Brain: 1.  Discontinuous nodular foci of diffusion restriction are present within the a geographic territory in the inferior medial right cerebellum, consistent with acute to early subacute infarcts. There is slight local mass effect without evidence of hemorrhagic transformation. 2. No supratentorial acute infarction is identified. 3. Scattered areas of hyperintense FLAIR and T2 signal are present in the periventricular and subcortical white matter of bilateral cerebral hemispheres are nonspecific, although favored to represent chronic sequela of microvascular disease.   MRA: 1.  Asymmetrically diminished flow enhancement is present in the expected location of the extracranial right vertebral artery compared to the contralateral left side proximally, with no flow enhancement identified in the vessel more distally. 2. Fat saturated T1 images of the neck are significantly degraded by motion, however they demonstrate asymmetric increased T1 signal within the lumen of the right vertebral artery compared to the contralateral left side suggestive of an underlying hematoma and dissection. 3. No significant stenosis is present in the carotid arteries in the neck or visualized more distal anterior or posterior intracranial circulation.   MACRO: None   Signed by: Hyacinth Segura 10/15/2024 9:47 PM Dictation workstation:   [id number]    MR angio head wo IV contrast    Result Date: 10/15/2024  Interpreted By:  Hyacinth Segura, STUDY: MR BRAIN WO IV CONTRAST; MR ANGIO NECK WO IV CONTRAST; MR NECK SOFT TISSUE ONLY WO IV CONTRAST; MR ANGIO HEAD WO IV CONTRAST;  10/15/2024 8:50 pm   INDICATION:  Signs/Symptoms:CVA work up; Signs/Symptoms:Concern for CVA or dissection; Signs/Symptoms:With T1 FAT SAT images to rule out dissection; Signs/Symptoms:concern for stroke or dissection.  Stroke protocol.     COMPARISON: Same day noncontrast CT head; same day CT of the head and neck;   ACCESSION NUMBER(S): [id number]; [id number]; [id number]; [id number]   ORDERING CLINICIAN: SERINA PRICE; TAISHA GARNETT   TECHNIQUE: Axial T2, FLAIR, DWI, gradient echo T2 and  sagittal and coronal T1 weighted images of brain were acquired.   Time-of-flight MRA of the head  and neck was performed. The images were reviewed as source images and maximum intensity projections.   Additionally, fat saturated T1 weighted sequences were obtained throughout the neck under dissection protocol.   FINDINGS: Brain:   Patchy discontinuous foci of diffusion restriction are present within the geographic territory in the inferior medial right cerebellum (series 5, image 49), consistent with acute to early subacute infarct.   No additional areas of diffusion restriction are identified intracranially.   There is no evidence of acute to early subacute intracranial hemorrhage. No midline shift is identified. There may be slight local mass effect present in the right cerebellum at the area of the infarcts without evidence of hemorrhagic transformation. No hemosiderin staining is present.   No ventricular dilatation is identified. Basal cisterns are patent. No extra-axial fluid collections are identified.   Scattered foci of hyperintense FLAIR and T2 signal are present in the periventricular and subcortical white matter of bilateral cerebral hemispheres are nonspecific, but favored to represent sequela of microvascular disease.   Small amount of mucus/secretions are present in the inferior maxillary sinuses. No abnormal fluid signal is present in the mastoid air cells.   MRA of head:   Intracranial carotid arteries demonstrate symmetric flow  enhancement without evidence of occlusion or high-grade stenosis. Carotid terminals are symmetric in appearance.   Anterior cerebral arteries demonstrate symmetric flow enhancement proximally without evidence of occlusion.   No occlusion or high-grade stenosis is present in the M1 segment of the middle cerebral arteries or its more distal visualized M2 or M3 cortical branches.   No expected flow enhancement is identified in the expected location of the right vertebral artery. No occlusion is identified within the visualized left vertebral artery.   Basilar artery is diminutive in appearance, possibly in part due to posterior cerebral arteries being supplied by the posterior communicating arteries bilaterally. No occlusion is identified in the diminutive basilar artery or in the posterior cerebral arteries bilaterally.   MRA of neck:   MRA source images are somewhat degraded by motion.   Visualized common carotid arteries demonstrate symmetric flow enhancement proximally without evidence of occlusion.   Extracranial internal carotid arteries demonstrate somewhat tortuous course bilaterally without evidence of occlusion or stenosis.   There is asymmetrically diminished flow enhancement in the location of the right extracranial vertebral artery proximally compared to the contralateral left side, with no flow enhancement present in the expected location of the vessel distally.   Fat sat T1 weighted images of the neck are degraded by motion, however they demonstrate asymmetrically increased T1 signal in the region of the lumen of the right vertebral artery compared to the contralateral left side (series 2, image 4) suggestive of underlying hematoma/dissection.       MRI Brain: 1.  Discontinuous nodular foci of diffusion restriction are present within the a geographic territory in the inferior medial right cerebellum, consistent with acute to early subacute infarcts. There is slight local mass effect without evidence of  hemorrhagic transformation. 2. No supratentorial acute infarction is identified. 3. Scattered areas of hyperintense FLAIR and T2 signal are present in the periventricular and subcortical white matter of bilateral cerebral hemispheres are nonspecific, although favored to represent chronic sequela of microvascular disease.   MRA: 1.  Asymmetrically diminished flow enhancement is present in the expected location of the extracranial right vertebral artery compared to the contralateral left side proximally, with no flow enhancement identified in the vessel more distally. 2. Fat saturated T1 images of the neck are significantly degraded by motion, however they demonstrate asymmetric increased T1 signal within the lumen of the right vertebral artery compared to the contralateral left side suggestive of an underlying hematoma and dissection. 3. No significant stenosis is present in the carotid arteries in the neck or visualized more distal anterior or posterior intracranial circulation.   MACRO: None   Signed by: Hyacinth Segura 10/15/2024 9:47 PM Dictation workstation:   [id number]    CT brain attack angio head and neck W and WO IV contrast    Result Date: 10/15/2024  Interpreted By:  Jerod Gonzalez, STUDY: CT BRAIN ATTACK ANGIO HEAD AND NECK W AND WO IV CONTRAST; ; 10/15/2024 3:15 pm   INDICATION: Signs/Symptoms:right sided face weakness, NIH stroke scale zero.     COMPARISON: CT head from 10/15/2024.   ACCESSION NUMBER(S): [id number]   ORDERING CLINICIAN: GILDA MCKEON   TECHNIQUE: CTA of the head and neck was performed after administration of 80 mL Omnipaque 350 intravenously. Segmented MIPs are provided.   FINDINGS: CTA neck:   The aortic arch and origins of the great vessels arising from the aortic arch are without luminal narrowing. There is tortuosity of the bilateral common carotid arteries and there is no luminal narrowing.   There is minimal atherosclerotic calcification located within the right carotid  bulb. There is 0% luminal narrowing of the bilateral carotid bulbs, according to NASCET criteria. There is no narrowing of the bilateral internal carotid arteries.   Bilateral vertebral arteries arise from the subclavian arteries. There is no narrowing of the left vertebral artery.   Right vertebral artery arises from the right subclavian arteries. Contrast related enhancement is seen within the V1 and V2 segments of the right vertebral artery. There is diminished contrast related enhancement within the V2 V3 junction of the right vertebral artery with diminished contrast related enhancement located within the V3 segment. No striking contrast related enhancement is seen within the superior-most portion of the V3 segment of the right vertebral artery.   CTA head:   There are atherosclerotic calcifications located within the paraophthalmic segments of the bilateral internal carotid arteries causing no luminal narrowing. There is no narrowing of the visualized portions of the bilateral internal carotid arteries. There is no narrowing of the visualized portions of the bilateral anterior or middle cerebral arteries.   The left intradural vertebral artery is somewhat decreased in luminal caliber. The right intradural vertebral artery demonstrates essentially no contrast related enhancement until the distal most portion at the vertebrobasilar junction. The basilar artery is mildly diffusely decreased in luminal caliber and contains a fenestration within the basilar tip.   There are prominent bilateral posterior communicating arteries which appear to primarily supply the bilateral posterior cerebral arteries. There is no striking narrowing of the visualized portions of the bilateral posterior cerebral arteries.   There are no aneurysms.   Additional findings:   There are osseous degenerative changes of the cervical spine. The thyroid gland is heterogeneous in appearance and there is a partially calcified 1 cm nodule located  within the posterior portion of the right thyroid lobe.       CTA neck:   1. Markedly diminished contrast related enhancement located within the V2/V3 segment of the right vertebral artery with gradual loss of contrast related enhancement within the V3 segment. On the prior MRI cervical spine from 2020, flow voids were seen within the right vertebral artery within these regions, therefore these findings could reflect a sequela of an age indeterminate dissection involving the V3 segment of the right vertebral artery. Consider further evaluation with MRI of the brain and dissection protocol.   2. Otherwise, no narrowing of the visualized arteries in the neck.   CTA head:   1. No contrast related enhancement located within the right intradural vertebral artery until the vertebrobasilar junction.   2. Left intradural vertebral artery and basilar artery are diffusely decreased in luminal caliber, possibly developmental variant as there are prominent bilateral posterior communicating arteries which appear to primarily supply the bilateral posterior cerebral arteries.   3. Otherwise, no striking narrowing of the visualized arteries in the head.   This study was interpreted at [facility].     MACRO: Vy Gonzalez discussed the significance and urgency of this critical finding by telephone with  Dr. Hui on 10/15/2024 at 3:47 pm. (**-RCF-**) Findings:  See findings.   e   Signed by: Jerod Gonzalez 10/15/2024 3:48 PM Dictation workstation:   [id number]    CT brain attack head wo IV contrast    Result Date: 10/15/2024  Interpreted By:  Filomena Aquino, STUDY: CT BRAIN ATTACK HEAD WO IV CONTRAST;  10/15/2024 2:58 pm   INDICATION: Signs/Symptoms:Stroke Evaluation.   COMPARISON: None   ACCESSION NUMBER(S): [id number]   ORDERING CLINICIAN: GILDA MCKEON   TECHNIQUE: Examination was performed in the axial plane with sagittal and coronal reconstructions.   FINDINGS: INTRACRANIAL: There is  prominence of the ventricular system and cerebral sulci consistent with cerebral atrophy. No mass or mass effect is identified. There is no hemorrhage or subdural fluid collection. There is no acute infarct.     EXTRACRANIAL: Visualized paranasal sinuses and mastoids are clear.       No acute intracranial pathology.   MACRO: Filomnea Miles discussed the significance and urgency of this critical finding plate secure chat with  GILDA MCKEON on 10/15/2024 at 3:23 pm.  (**-RCF-**) Findings:  See findings.   Signed by: Filomena Aquino 10/15/2024 3:23 PM Dictation workstation:   [id number]    CT lung screening low dose    Result Date: 9/28/2024  Interpreted By:  Neil Lomeli, STUDY: CT LUNG SCREENING LOW DOSE; 9/26/2024 10:51 am   INDICATION: Signs/Symptoms:See Associated Diagnosis.   COMPARISON: None.   ACCESSION NUMBER(S): [id number]   ORDERING CLINICIAN: CHAVEZ ZHENG   TECHNIQUE: Helical data acquisition of the chest was obtained without IV contrast material.  Images were reformatted in axial, coronal, and sagittal planes.   FINDINGS: LUNGS AND AIRWAYS: The trachea and central airways are patent. No endobronchial lesion is seen.   There is mild subpleural reticulation consistent with smoking-related changes. There is bibasilar atelectatic changes most likely postinfectious/inflammatory. There is a calcified left lower lobe granuloma. There is a 2 mm subpleural right middle lobe parenchymal lung nodule. There are no suspicious lung nodules.There is no focal consolidation, pleural effusion, or pneumothorax.       MEDIASTINUM AND PHYLLIS, LOWER NECK AND AXILLA: There is mild thyromegaly with right thyroid lobe calcifications. Scattered mediastinal lymph nodes are felt to be reactive/inflammatory in nature. Calcified hilar and paraesophageal lymph nodes. Esophagus appears within normal limits as seen.   HEART AND VESSELS: The thoracic aorta normal in course and caliber. Main pulmonary artery and its branches are  normal in caliber. Estimated coronary artery calcium score is 0. The cardiac chambers are not enlarged. There is no pericardial effusion seen.   UPPER ABDOMEN: Bilateral fat containing adrenal adenomas.       CHEST WALL AND OSSEOUS STRUCTURES: Chest wall is within normal limits. No acute osseous pathology.There are no suspicious osseous lesions.       1.  Few small bilateral noncalcified pulmonary nodules measuring up to 5 mm, likely benign. Continued screening with low-dose noncontrast chest CT in 12 months (from current date) is recommended. 2. Estimated coronary artery calcium score is 0* which correlates with at least 0th percentile rank as compared to matched MATA-study subjects([email or web address]-[email or web address]/[email or web address]).   LUNG RADS CATEGORY: Lung Rad: Lung-RADS 2 (Benign Appearance or Indolent Behavior)   Recommendation: Continue annual screening with Low Dose Chest CT in 12 months, recommended as per American College of Radiology Guidelines Lung-RADS Version 2022.       *The patient's CAC score was measured with an FDA-cleared AI tool that correlates well with traditional methods. However, due to the non-gated CT scan and new algorithm, AI-powered scores should not replace traditional cardiovascular risk assessment. 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  The MRI of the brain does show a right cerebellar stroke.  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  The patient needs a vascular surgery consult.  The patient needs to be on Plavix and aspirin and we will continue this for 3 months.  After  that, I will obtain a repeat CT angiogram of the neck and brain to see if the dissection has improved.  The patient does not wear a cardiac monitor.  The patient needs to stop smoking.  The patient does need to be on a statin for her elevated LDL.  The patient would benefit from a stay in acute rehab.  The patient's MRI of the cervical spine shows severe cervical stenosis.  The patient needs a neurosurgery consult.  The patient does need to continue stroke risk factor modification.  I discussed all these issues in detail with the patient and answered all her questions.  I will sign off for now.  The patient follow-up with me office in 4 months.

## 2024-10-17 ENCOUNTER — APPOINTMENT (OUTPATIENT)
Dept: PRIMARY CARE | Facility: CLINIC | Age: 69
End: 2024-10-17
Payer: COMMERCIAL

## 2024-10-17 LAB
ALBUMIN SERPL BCP-MCNC: 3.9 G/DL (ref 3.4–5)
ANION GAP SERPL CALC-SCNC: 12 MMOL/L (ref 10–20)
BUN SERPL-MCNC: 24 MG/DL (ref 6–23)
CALCIUM SERPL-MCNC: 9.6 MG/DL (ref 8.6–10.3)
CHLORIDE SERPL-SCNC: 105 MMOL/L (ref 98–107)
CO2 SERPL-SCNC: 29 MMOL/L (ref 21–32)
CREAT SERPL-MCNC: 1.23 MG/DL (ref 0.5–1.05)
EGFRCR SERPLBLD CKD-EPI 2021: 47 ML/MIN/1.73M*2
ERYTHROCYTE [DISTWIDTH] IN BLOOD BY AUTOMATED COUNT: 12.2 % (ref 11.5–14.5)
GLUCOSE BLD MANUAL STRIP-MCNC: 104 MG/DL (ref 74–99)
GLUCOSE BLD MANUAL STRIP-MCNC: 147 MG/DL (ref 74–99)
GLUCOSE BLD MANUAL STRIP-MCNC: 191 MG/DL (ref 74–99)
GLUCOSE SERPL-MCNC: 94 MG/DL (ref 74–99)
HCT VFR BLD AUTO: 39.5 % (ref 36–46)
HGB BLD-MCNC: 13.4 G/DL (ref 12–16)
MAGNESIUM SERPL-MCNC: 2.22 MG/DL (ref 1.6–2.4)
MCH RBC QN AUTO: 33.8 PG (ref 26–34)
MCHC RBC AUTO-ENTMCNC: 33.9 G/DL (ref 32–36)
MCV RBC AUTO: 100 FL (ref 80–100)
NRBC BLD-RTO: 0 /100 WBCS (ref 0–0)
PHOSPHATE SERPL-MCNC: 3 MG/DL (ref 2.5–4.9)
PLATELET # BLD AUTO: 245 X10*3/UL (ref 150–450)
POTASSIUM SERPL-SCNC: 3.4 MMOL/L (ref 3.5–5.3)
RBC # BLD AUTO: 3.96 X10*6/UL (ref 4–5.2)
SODIUM SERPL-SCNC: 143 MMOL/L (ref 136–145)
WBC # BLD AUTO: 9.2 X10*3/UL (ref 4.4–11.3)

## 2024-10-17 PROCEDURE — 82947 ASSAY GLUCOSE BLOOD QUANT: CPT

## 2024-10-17 PROCEDURE — 2500000001 HC RX 250 WO HCPCS SELF ADMINISTERED DRUGS (ALT 637 FOR MEDICARE OP): Performed by: INTERNAL MEDICINE

## 2024-10-17 PROCEDURE — 99232 SBSQ HOSP IP/OBS MODERATE 35: CPT | Performed by: NURSE PRACTITIONER

## 2024-10-17 PROCEDURE — 36415 COLL VENOUS BLD VENIPUNCTURE: CPT | Performed by: INTERNAL MEDICINE

## 2024-10-17 PROCEDURE — 85027 COMPLETE CBC AUTOMATED: CPT | Performed by: INTERNAL MEDICINE

## 2024-10-17 PROCEDURE — 2500000002 HC RX 250 W HCPCS SELF ADMINISTERED DRUGS (ALT 637 FOR MEDICARE OP, ALT 636 FOR OP/ED): Performed by: INTERNAL MEDICINE

## 2024-10-17 PROCEDURE — 80069 RENAL FUNCTION PANEL: CPT | Performed by: INTERNAL MEDICINE

## 2024-10-17 PROCEDURE — 97530 THERAPEUTIC ACTIVITIES: CPT | Mod: GP

## 2024-10-17 PROCEDURE — 2500000004 HC RX 250 GENERAL PHARMACY W/ HCPCS (ALT 636 FOR OP/ED): Performed by: INTERNAL MEDICINE

## 2024-10-17 PROCEDURE — 1200000002 HC GENERAL ROOM WITH TELEMETRY DAILY

## 2024-10-17 PROCEDURE — 94760 N-INVAS EAR/PLS OXIMETRY 1: CPT

## 2024-10-17 PROCEDURE — 83735 ASSAY OF MAGNESIUM: CPT | Performed by: INTERNAL MEDICINE

## 2024-10-17 PROCEDURE — 97535 SELF CARE MNGMENT TRAINING: CPT | Mod: GO

## 2024-10-17 PROCEDURE — 99239 HOSP IP/OBS DSCHRG MGMT >30: CPT | Performed by: INTERNAL MEDICINE

## 2024-10-17 RX ORDER — ASPIRIN 81 MG/1
81 TABLET ORAL DAILY
Start: 2024-10-18 | End: 2024-11-17

## 2024-10-17 RX ORDER — POTASSIUM CHLORIDE 20 MEQ/1
40 TABLET, EXTENDED RELEASE ORAL ONCE
Status: COMPLETED | OUTPATIENT
Start: 2024-10-17 | End: 2024-10-17

## 2024-10-17 RX ORDER — ATORVASTATIN CALCIUM 40 MG/1
40 TABLET, FILM COATED ORAL NIGHTLY
Status: ON HOLD
Start: 2024-10-17 | End: 2024-10-30

## 2024-10-17 RX ORDER — CLOPIDOGREL BISULFATE 75 MG/1
75 TABLET ORAL DAILY
Status: ON HOLD
Start: 2024-10-18 | End: 2024-10-30

## 2024-10-17 ASSESSMENT — PAIN SCALES - GENERAL
PAINLEVEL_OUTOF10: 3
PAINLEVEL_OUTOF10: 0 - NO PAIN
PAINLEVEL_OUTOF10: 3
PAINLEVEL_OUTOF10: 0 - NO PAIN
PAINLEVEL_OUTOF10: 0 - NO PAIN

## 2024-10-17 ASSESSMENT — COGNITIVE AND FUNCTIONAL STATUS - GENERAL
EATING MEALS: A LITTLE
DRESSING REGULAR UPPER BODY CLOTHING: A LITTLE
STANDING UP FROM CHAIR USING ARMS: A LITTLE
TOILETING: A LOT
CLIMB 3 TO 5 STEPS WITH RAILING: A LITTLE
WALKING IN HOSPITAL ROOM: A LOT
MOBILITY SCORE: 20
CLIMB 3 TO 5 STEPS WITH RAILING: TOTAL
STANDING UP FROM CHAIR USING ARMS: A LITTLE
MOVING TO AND FROM BED TO CHAIR: A LITTLE
TURNING FROM BACK TO SIDE WHILE IN FLAT BAD: A LITTLE
DRESSING REGULAR LOWER BODY CLOTHING: A LOT
WALKING IN HOSPITAL ROOM: A LITTLE
MOBILITY SCORE: 16
HELP NEEDED FOR BATHING: A LOT
DAILY ACTIVITIY SCORE: 24
PERSONAL GROOMING: A LITTLE
MOBILITY SCORE: 20
MOVING TO AND FROM BED TO CHAIR: A LITTLE
STANDING UP FROM CHAIR USING ARMS: A LITTLE
DAILY ACTIVITIY SCORE: 15
DAILY ACTIVITIY SCORE: 24
CLIMB 3 TO 5 STEPS WITH RAILING: A LITTLE
WALKING IN HOSPITAL ROOM: A LITTLE
MOVING TO AND FROM BED TO CHAIR: A LITTLE

## 2024-10-17 ASSESSMENT — PAIN SCALES - WONG BAKER: WONGBAKER_NUMERICALRESPONSE: NO HURT

## 2024-10-17 ASSESSMENT — ACTIVITIES OF DAILY LIVING (ADL): HOME_MANAGEMENT_TIME_ENTRY: 12

## 2024-10-17 ASSESSMENT — PAIN DESCRIPTION - DESCRIPTORS: DESCRIPTORS: ACHING

## 2024-10-17 ASSESSMENT — PAIN - FUNCTIONAL ASSESSMENT
PAIN_FUNCTIONAL_ASSESSMENT: 0-10
PAIN_FUNCTIONAL_ASSESSMENT: 0-10

## 2024-10-17 ASSESSMENT — PAIN DESCRIPTION - LOCATION: LOCATION: HEAD

## 2024-10-17 NOTE — PROGRESS NOTES
"Jahaira Moore is a 69 y.o. female on day 2 of admission presenting with Facial paresthesia.    Subjective   Patient was evaluated by this practitioner and Dr. Smith.  Patient's symptoms have reduced since yesterday.  She was able to ambulate in the hallway with a walker.  The numbness on her right cheek is also decreased.         Objective     Vitals:    10/17/24 1200   BP:    Pulse:    Resp:    Temp:    SpO2: 94%      Physical Exam  Constitutional: Well developed , awake/alert/oriented x3, in no distress,  Eyes: Clear sclera  ENMT: mucous membranes are moist, no apparent injury, no lesions seen,   Head/neck: Neck supple, trachea  is midline, no apparent injury, no bruits, no mass, no stridor  Respiratory/thorax: Breath sounds clear and equal bilaterally with good chest expansion, thorax symmetric  Cardiac/Vascular: Regular, rate and rhythm, no murmurs, 2+ radial pulses, palpable bilateral popliteals, DPs and PTs  Gastrointestinal: Nondistended soft nontender, positive bowel sounds, no bruits.  Musculoskeletal: Moves all extremities, limited range of motion , no joint swelling,   Extremities: No cyanosis, no contusions or wounds,   Neurological: Alert and oriented x3, cranial nerves II through XII grossly intact. No focal deficits, face symmetric, no facial drooping or tongue deviation, or dysarthria, normal strength,  sensation intact. Handgrasps equal, push pulls equal no lateralizing symptoms.  Patient does have numbness to right cheek and balance/dizziness  Lymphatic: No significant lymphadenopathy  Skin: Warm and dry, no lesions, no rashes  Psychological: Appropriate mood and behavior  Blood pressure 117/77, pulse 83, temperature 37.1 °C (98.8 °F), resp. rate 20, height 1.6 m (5' 3\"), weight 58.1 kg (128 lb), SpO2 94%.        Intake/Output last 3 Shifts:  No intake/output data recorded.    Patient Active Problem List    Diagnosis Date Noted    Cerebral infarction due to occlusion of right vertebral artery " (Multi) 10/15/2024    Facial paresthesia 10/15/2024    Routine general medical examination at health care facility 09/05/2024    Need for vaccination 09/05/2024    Personal history of tobacco use, presenting hazards to health 09/05/2024    Screening for cardiovascular condition 09/05/2024    Ganglion cyst 09/05/2024    Agitated depression (Multi) 09/05/2024          Current Facility-Administered Medications:     acetaminophen (Tylenol) tablet 650 mg, 650 mg, oral, q4h PRN, Kimmie Rizvi, DO, 650 mg at 10/15/24 2305    aspirin EC tablet 81 mg, 81 mg, oral, Daily, Judit Seo, DO, 81 mg at 10/17/24 0841    atorvastatin (Lipitor) tablet 40 mg, 40 mg, oral, Nightly, Judit Seo, DO, 40 mg at 10/16/24 2029    buPROPion XL (Wellbutrin XL) 24 hr tablet 300 mg, 300 mg, oral, Daily, Kimmie Rizvi, DO, 300 mg at 10/17/24 0842    clopidogrel (Plavix) tablet 75 mg, 75 mg, oral, Daily, Kimmie Rizvi, DO, 75 mg at 10/17/24 0842    gabapentin (Neurontin) capsule 300 mg, 300 mg, oral, TID, Kimmie Rizvi, DO, 300 mg at 10/17/24 0842    heparin (porcine) injection 5,000 Units, 5,000 Units, subcutaneous, q8h, Carlos Zimmer DO, 5,000 Units at 10/17/24 1030    hydrALAZINE (Apresoline) injection 10 mg, 10 mg, intravenous, q20 min PRN **FOLLOWED BY** hydrALAZINE (Apresoline) tablet 25 mg, 25 mg, oral, q6h PRN, Judit Seo DO    oxygen (O2) therapy, , inhalation, Continuous PRN - O2/gases, Judit Seo DO    pantoprazole (ProtoNix) EC tablet 40 mg, 40 mg, oral, Daily, Kimmie Rizvi, DO, 40 mg at 10/17/24 0842    perflutren lipid microspheres (Definity) injection 0.5-10 mL of dilution, 0.5-10 mL of dilution, intravenous, Once in imaging, Judit Seo DO    perflutren protein A microsphere (Optison) injection 0.5 mL, 0.5 mL, intravenous, Once in imaging, Judit Seo DO    polyethylene glycol (Glycolax, Miralax) packet 17 g, 17 g, oral, Daily PRN, uJdit Seo DO, 17 g at 10/17/24 0905    predniSONE  (Deltasone) tablet 1 mg, 1 mg, oral, Daily, Kimmie Randolphed, DO, 1 mg at 10/17/24 0842    predniSONE (Deltasone) tablet 5 mg, 5 mg, oral, Daily, Kimmie Randolphed, DO, 5 mg at 10/17/24 0843    sulfur hexafluoride microsphr (Lumason) injection 24.28 mg, 2 mL, intravenous, Once in imaging, Taisha Seo DO    traZODone (Desyrel) tablet 50 mg, 50 mg, oral, Nightly, Kimmie Rizvi, DO, 50 mg at 10/16/24 2029     Lab Results   Component Value Date    WBC 9.2 10/17/2024    HGB 13.4 10/17/2024    HCT 39.5 10/17/2024     10/17/2024    CHOL 180 10/15/2024    TRIG 131 10/15/2024    HDL 61.5 10/15/2024    ALT 12 10/15/2024    AST 14 10/15/2024     10/17/2024    K 3.4 (L) 10/17/2024     10/17/2024    CREATININE 1.23 (H) 10/17/2024    BUN 24 (H) 10/17/2024    CO2 29 10/17/2024    TSH 1.71 05/11/2022    INR 0.9 10/15/2024    HGBA1C 5.0 10/15/2024       ECG 12 lead    Result Date: 10/16/2024  Normal sinus rhythm Inferior infarct , age undetermined Abnormal ECG No previous ECGs available See ED provider note for full interpretation and clinical correlation Confirmed by Charlee Marshall ([id number]) on 10/16/2024 4:33:46 PM    Transthoracic Echo (TTE) Complete    Result Date: 10/16/2024   [facility], [address] [place] [address]           Tel [phone] and Fax [phone] TRANSTHORACIC ECHOCARDIOGRAM REPORT  Patient Name:      DEVANTEESCOBAR Fletcher Physician:    [id number] Kana Gan DO Study Date:        10/16/2024           Ordering Provider:    [id number] TAISHA SEO MRN/PID:           [id number]             Fellow: Accession#:        [id number]         Nurse:                Eve Perez RN Date of Birth/Age: 1955 / 69 years  Sonographer:          Mikael Tracey                                                                RDCS Gender:            F                    Additional Staff: Height:            160.00 cm            Admit Date:           10/15/2024 Weight:            58.00 kg             Admission Status:     Inpatient -                                                               Routine BSA / BMI:         1.60 m2 / 22.66      Encounter#:           [id number]                    kg/m2 Blood Pressure:    131/82 mmHg          Department Location:  [address]                                                               [facility] Study Type:    TRANSTHORACIC ECHO (TTE) COMPLETE Diagnosis/ICD: Cerebral ischemia-I67.82; Cerebral Infarction, unspecified-I63.9 Indication:    Cerebral infarction due to occlusion of right vertebral artery                (Multi) (I63.211) ; CPT Code:      Echo Complete w Full Doppler-[id number] Patient History: Pertinent History: PMHx of osteoporosis who presented to [facility] on 10/16/2024                    with a chief complaint of nausea vomiting right facial                    paresthesia and gait instability. Study Detail: The following Echo studies were performed: 2D, M-Mode, Doppler,               color flow and 3D. Technically challenging study due to body               habitus. Agitated saline used as a contrast agent for intraseptal               flow evaluation.  PHYSICIAN INTERPRETATION: Left Ventricle: The left ventricular systolic function is normal, with a Walker's biplane calculated ejection fraction of 71%. There are no regional left ventricular wall motion abnormalities. The left ventricular cavity size is normal. There is a false tendon visualized in the left ventricle. There is mild to moderate concentric left ventricular hypertrophy. Spectral Doppler shows an impaired relaxation pattern of left ventricular diastolic filling. Left Atrium: The left atrium is normal in size. There is no evidence of a patent foramen ovale. A bubble study using  agitated saline was performed. Bubble study is negative. Right Ventricle: The right ventricle is normal in size. There is normal right ventricular global systolic function. Right Atrium: The right atrium is normal in size. Aortic Valve: The aortic valve is trileaflet. There is mild aortic valve thickening. The aortic valve dimensionless index is 0.96. There is no evidence of aortic valve regurgitation. The peak instantaneous gradient of the aortic valve is 11.2 mmHg. The mean gradient of the aortic valve is 6.0 mmHg. Mitral Valve: The mitral valve is mildly thickened. There is trace mitral valve regurgitation. Tricuspid Valve: The tricuspid valve is structurally normal. No evidence of tricuspid regurgitation. Pulmonic Valve: The pulmonic valve is structurally normal. There is mild to moderate pulmonic valve regurgitation. Pericardium: There is no pericardial effusion noted. Aorta: The aortic root is abnormal. There is mild dilatation of the ascending aorta. Systemic Veins: The inferior vena cava appears normal in size.  CONCLUSIONS:  1. The left ventricular systolic function is normal, with a Walker's biplane calculated ejection fraction of 71%.  2. Spectral Doppler shows an impaired relaxation pattern of left ventricular diastolic filling.  3. There is normal right ventricular global systolic function.  4. There is mild to moderate pulmonic valve regurgitation.  5. There is no evidence of a patent foramen ovale. QUANTITATIVE DATA SUMMARY:  2D MEASUREMENTS:          Normal Ranges: LAs:             2.50 cm  (2.7-4.0cm) IVSd:            1.30 cm  (0.6-1.1cm) LVPWd:           1.60 cm  (0.6-1.1cm) LVIDd:           2.60 cm  (3.9-5.9cm) LVIDs:           1.70 cm LV Mass Index:   78 g/m2 LVEDV Index:     41 ml/m2 LV % FS          34.6 %  LA VOLUME:                    Normal Ranges: LA Vol A4C:        27.7 ml    (22+/-6mL/m2) LA Vol A2C:        46.1 ml LA Vol BP:         38.2 ml LA Vol Index A4C:  17.3ml/m2 LA Vol Index A2C:   28.8 ml/m2 LA Vol Index BP:   23.9 ml/m2 LA Area A4C:       12.4 cm2 LA Area A2C:       17.1 cm2 LA Major Axis A4C: 4.7 cm LA Major Axis A2C: 5.4 cm LA Volume Index:   23.9 ml/m2  RA VOLUME BY A/L METHOD:            Normal Ranges: RA Vol A4C:              28.6 ml    (8.3-19.5ml) RA Vol Index A4C:        17.9 ml/m2 RA Area A4C:             12.3 cm2 RA Major Axis A4C:       4.5 cm  M-MODE MEASUREMENTS:         Normal Ranges: Ao Root:             3.30 cm (2.0-3.7cm) LAs:                 4.50 cm (2.7-4.0cm)  AORTA MEASUREMENTS:         Normal Ranges: Ao Sinus, d:        3.30 cm (2.1-3.5cm) Ao STJ, d:          2.90 cm (1.7-3.4cm) Asc Ao, d:          4.10 cm (2.1-3.4cm)  LV SYSTOLIC FUNCTION BY 2D PLANIMETRY (MOD):                      Normal Ranges: EF-A4C View:    57 % (>=55%) EF-A2C View:    80 % EF-Biplane:     71 % LV EF Reported: 71 %  LV DIASTOLIC FUNCTION:             Normal Ranges: MV Peak E:             0.64 m/s    (0.7-1.2 m/s) MV Peak A:             1.12 m/s    (0.42-0.7 m/s) E/A Ratio:             0.58        (1.0-2.2) MV e'                  0.070 m/s   (>8.0) MV lateral e'          0.07 m/s MV medial e'           0.07 m/s MV A Dur:              135.00 msec E/e' Ratio:            9.26        (<8.0) PulmV Sys Gil:         61.60 cm/s PulmV Banerjee Gil:        33.40 cm/s PulmV S/D Gil:         1.80 PulmV A Revs Gil:      36.90 cm/s PulmV A Revs Dur:      121.00 msec  MITRAL VALVE:          Normal Ranges: MV Vmax:      1.13 m/s (<=1.3m/s) MV peak P.1 mmHg (<5mmHg) MV mean P.0 mmHg (<2mmHg) MV DT:        274 msec (150-240msec)  AORTIC VALVE:                      Normal Ranges: AoV Vmax:                1.67 m/s  (<=1.7m/s) AoV Peak P.2 mmHg (<20mmHg) AoV Mean P.0 mmHg  (1.7-11.5mmHg) LVOT Max Gil:            1.49 m/s  (<=1.1m/s) AoV VTI:                 36.40 cm  (18-25cm) LVOT VTI:                35.00 cm LVOT Diameter:           1.80 cm   (1.8-2.4cm) AoV Area, VTI:            2.45 cm2  (2.5-5.5cm2) AoV Area,Vmax:           2.27 cm2  (2.5-4.5cm2) AoV Dimensionless Index: 0.96  RIGHT VENTRICLE: RV Basal 2.70 cm RV Mid   2.60 cm RV Major 6.3 cm TAPSE:   18.8 mm RV s'    0.12 m/s  TRICUSPID VALVE/RVSP:          Normal Ranges: Peak TR Velocity:     2.47 m/s Est. RA Pressure:     3 mmHg RV Syst Pressure:     27 mmHg  (< 30mmHg) IVC Diam:             1.40 cm  PULMONIC VALVE:          Normal Ranges: PV Max Gil:     1.0 m/s  (0.6-0.9m/s) PV Max P.3 mmHg  Pulmonary Veins: PulmV A Revs Dur: 121.00 msec PulmV A Revs Gil: 36.90 cm/s PulmV Banerjee Gil:   33.40 cm/s PulmV S/D Gil:    1.80 PulmV Sys Gil:    61.60 cm/s  [id number] Kana Gan DO Electronically signed on 10/16/2024 at 4:18:37 PM  ** Final **     Vascular US carotid artery duplex bilateral    Result Date: 10/16/2024           [place] [address] Tel [phone] and Fax [phone]  Vascular Lab Report [facility] US CAROTID ARTERY DUPLEX BILATERAL  Patient Name:      DEVANTE Fletcher Physician: [id number] Marlen Kumar MD Study Date:        10/16/2024         Ordering           [id number] MOHAN ANDINO                                       Physician:         ANNE MRN/PID:           [id number]           Technologist:      Lamar Vásquez RVT Accession#:        [id number]       Technologist 2: Date of Birth/Age: 1955 / 69      Encounter#:        [id number]                    years Gender:            F Admission Status:  Inpatient          Location           [facility]                                       Performed:  Diagnosis/ICD: Other specified symptoms and signs involving the circulatory and                respiratory systems-R09.89 CPT Codes:     58819 Cerebrovascular Carotid Duplex scan complete  CONCLUSIONS: Right Carotid: Findings are consistent with less than 50% stenosis of the right proximal internal carotid artery.  Right external carotid artery appears patent with no evidence of stenosis. No evidence of hemodynamically significant stenosis of the right common carotid artery. The right vertebral artery demonstrates a high resistance waveform which may be suggestive of a more distal stenosis or occlusion. There are elevated velocities in the right subclavian artery that are suggestive of disease. Left Carotid: Findings are consistent with less than 50% stenosis of the left proximal internal carotid artery. Left external carotid artery appears patent with no evidence of stenosis. No evidence of hemodynamically significant stenosis of the left common carotid artery. The left vertebral artery is patent with antegrade flow. No evidence of hemodynamically significant stenosis in the left subclavian artery.  Imaging & Doppler Findings: Right Plaque Morph: The proximal right internal carotid artery demonstrates intimal thickening and calcified plaque. The distal right common carotid artery demonstrates intimal thickening plaque. Left Plaque Morph: The proximal left internal carotid artery demonstrates intimal thickening plaque. The distal left common carotid artery demonstrates intimal thickening plaque.   Right                        Left   PSV      EDV                PSV       cm/s           CCA P    120 cm/s 142 cm/s           CCA D    96 cm/s 78 cm/s  14 cm/s   ICA P    91 cm/s  22 cm/s 92 cm/s  29 cm/s   ICA M    87 cm/s  22 cm/s 81 cm/s  24 cm/s   ICA D    94 cm/s  29 cm/s 87 cm/s             ECA     80 cm/s 77 cm/s  0 cm/s  Vertebral  76 cm/s  19 cm/s 253 cm/s         Subclavian 203 cm/s               Right Left ICA/CCA Ratio  0.5  0.9   [id number] Marlen Kumar MD Electronically signed by [id number] Marlen Kumar MD on 10/16/2024 at 3:08:52 PM  ** Final **     MR lumbar spine wo IV contrast    Result Date: 10/15/2024  Interpreted By:  Hyacinth Segura, STUDY: MR LUMBAR SPINE WO IV CONTRAST;  10/15/2024 8:50 pm    INDICATION: Signs/Symptoms:LE weakness/ dizziness.     COMPARISON: MRI of the lumbar spine dated 02/04/2021   ACCESSION NUMBER(S): [id number]   ORDERING CLINICIAN: SERINA PRICE   TECHNIQUE: Sagittal T1, T2, STIR, axial T1 and T2 weighted images of the lumbar spine were acquired. No intravenous contrast was administered.   FINDINGS: There are 5 lumbar type non rib-bearing vertebral bodies with the lowest well-formed intervertebral disc space labeled L5-S1.   There is a subtle 1-2 mm retrolisthesis of L2 on L3 with a 2-3 mm anterolisthesis of the 3 on L4 and L4 on L5, similar in appearance to prior exam in February of 2021.   In the interim since prior exam in February of 2021, there has been interval worsening of compression deformity along the superior endplate of T12, with STIR hyperintense edema present along the superior endplate of T12 and along superior endplate of L1, new/increased from prior. STIR hyperintense fluid is also present within the intervertebral disc spaces of the T11-T12 and T12-L1, likely reactive/degenerative in etiology.   No compression fractures are identified in the lumbar spine, although STIR hyperintense edema is present along the superior endplates of L1 and L2.   Posterior elements of the lumbar spine do not demonstrate any new signal abnormalities. Multilevel degenerate facet osteoarthropathy is present, worst at L3-L4 and L4-L5.   Multilevel intervertebral disc desiccation is present with severe disc height loss at L1-L2, L2-L3 and L3-L4.   Visualized lower thoracic spinal cord does not demonstrate any intramedullary cord signal changes. Conus medullaris terminates at the level of T12-L1.   T12-L1: Disc osteophyte complex, hypertrophic facet changes and ligamentum flavum thickening somewhat efface the subarachnoid space in the subarticular recesses bilaterally without spinal canal stenosis. Mild-to-moderate bilateral neural foraminal narrowing is present due to hypertrophic  facet changes, worse on the left.   L1-L2: Disc osteophyte complex, hypertrophic facet changes and ligamentum flavum thickening somewhat efface the subarachnoid space in the subarticular recesses bilaterally, right-greater-than-left, without spinal canal stenosis. Moderate bilateral neural foraminal narrowing is present due to hypertrophic facet changes, slightly worse on the left, similar to prior study.   L2-L3: Disc osteophyte complex and ligamentum flavum thickening efface the subarticular recesses bilaterally, right worse than left contributes to mild spinal canal narrowing. Moderate right-sided and moderate to severe left-sided neural foraminal stenosis is present due to uncovertebral and facet joint hypertrophy, similar to prior exam.   L3-L4: Combination of disc osteophyte complex, spondylolisthesis and ligamentum flavum thickening efface the subarticular recesses and cause mild-to-moderate spinal canal stenosis, similar to prior exam. Moderate bilateral neural foraminal stenosis is present due to hypertrophic facet changes and spondylolisthesis, similar to prior study.   L4-L5: Disc osteophyte complex and spondylolisthesis efface the subarticular space and cause moderate spinal canal stenosis, similar to prior study. Moderate bilateral neural foraminal stenosis due to spondylolisthesis and hypertrophic facet changes is also similar to prior exam.   L5-S1: Disc osteophyte complex and hypertrophic facet changes somewhat efface the subarticular space without spinal canal stenosis. Mild bilateral neural foraminal narrowing is present due to hypertrophic facet changes and bulging disc.   Paraspinal soft tissues do not demonstrate any acute abnormality.       1.  New compression deformity is present along the superior endplate of T12 with some STIR hyperintense edema, likely more acute/subacute in etiology. No significant bony retropulsion is present. New STIR hyperintense edema is also present in the superior  endplate of L1 and L2, without associated height loss. STIR hyperintense fluid in the T11-T12 and T12-L1 intervertebral disc spaces likely posttraumatic/reactive in etiology. 2. Multilevel degenerative changes in the lumbar spine with associated spinal canal and neural foraminal stenosis are similar in appearance to prior exam in February of 2021, as detailed above.   MACRO: None   Signed by: Hyacinth Segura 10/15/2024 10:03 PM Dictation workstation:   [id number]    MR cervical spine wo IV contrast    Result Date: 10/15/2024  Interpreted By:  Hyacinth Segura, STUDY: MR CERVICAL SPINE WO IV CONTRAST;  10/15/2024 8:50 pm   INDICATION: Signs/Symptoms:Bilateral LE weaknes.     COMPARISON: MRI of the cervical spine dated 12/29/2020.   ACCESSION NUMBER(S): [id number]   ORDERING CLINICIAN: SERINA PRICE   TECHNIQUE: Sagittal T1, T2, STIR, axial T1 and axial T2 weighted images were acquired through the cervical spine.   FINDINGS: There is a 1-2 mm anterolisthesis present at the level of C2-C3, with a 1-2 mm retrolisthesis of C3 on C4 and C5 on C6. A 1-2 mm anterolisthesis is present at C7-T1. These are similar in appearance to prior exam in December of 2020.   Cervical vertebral body heights are preserved without evidence of new compression fractures. There is increased/new in STIR hyperintense endplate edema present along the inferior endplate of C5 and superior endplate of C6 and C7 in the interim since prior exam in December of 2020. Posterior elements of the cervical spine do not demonstrate any acute signal abnormalities.   Craniocervical junction is intact. Facet joints are preserved without evidence of traumatic subluxation or widening although multilevel degenerate facet osteoarthropathy is again present, most pronounced at C5-C6.   Multilevel intervertebral disc desiccation is present, with severe disc height loss noted at C4-C5 and C5-C6.   Cervical spinal cord does not demonstrate any discrete  intramedullary cord signal abnormality. There is some effacement of the cervical spinal cord at the level of C5-C6.   C2-C3: No posterior disc contour abnormality or spinal canal stenosis is identified. No significant neural foraminal narrowing is present bilaterally.   C3-C4: Disc osteophyte complex effaces the anterior subarachnoid space, without significant spinal canal stenosis. Severe bilateral neural foraminal stenosis is present due to uncovertebral and facet joint hypertrophy.   C4-C5: Disc osteophyte complex somewhat effaces anterior subarachnoid space without spinal canal narrowing. Severe left-sided and mild-to-moderate right-sided neural foraminal stenosis is present due to uncovertebral and facet joint hypertrophy, similar or slightly worsened since prior exam..   C5-C6: Combination of disc osteophyte complex and ligamentum flavum thickening/buckling causes mild-to-moderate spinal canal narrowing with the effacement of the anterior and posterior subarachnoid space and slight deformity of the ventral cervical spinal cord, similar in appearance to prior exam. Severe bilateral neural foraminal stenosis is present due to uncovertebral and facet joint hypertrophy, also similar in appearance to prior study.   C6-C7: Disc osteophyte complex somewhat effaces anterior subarachnoid space without spinal canal stenosis. Moderate to severe bilateral neural foraminal narrowing is present due to uncovertebral and facet joint hypertrophy.   C7-T1: No significant spinal canal stenosis is present. Mild-to-moderate bilateral neural foraminal narrowing is present due to uncovertebral and facet joint hypertrophy, similar in appearance to prior study.   Multilevel spondylolisthesis with disc osteophyte complex and ligamentum flavum thickening/buckling is visualized in the upper thoracic spine, with mild-to-moderate spinal canal narrowing likely present at the level of T2-T3 and T3-T4 but no associated intramedullary cord  signal abnormality is identified in the spine.       Multilevel degenerative changes of the cervical spine as detailed above, with severe neural foraminal stenosis present at several levels due to uncovertebral and facet joint hypertrophy.   MACRO: None   Signed by: Hyacinth Segura 10/15/2024 9:53 PM Dictation workstation:   [id number]    MR brain wo IV contrast    Result Date: 10/15/2024  Interpreted By:  Hyacinth Segura, STUDY: MR BRAIN WO IV CONTRAST; MR ANGIO NECK WO IV CONTRAST; MR NECK SOFT TISSUE ONLY WO IV CONTRAST; MR ANGIO HEAD WO IV CONTRAST;  10/15/2024 8:50 pm   INDICATION: Signs/Symptoms:CVA work up; Signs/Symptoms:Concern for CVA or dissection; Signs/Symptoms:With T1 FAT SAT images to rule out dissection; Signs/Symptoms:concern for stroke or dissection.  Stroke protocol.     COMPARISON: Same day noncontrast CT head; same day CT of the head and neck;   ACCESSION NUMBER(S): [id number]; [id number]; [id number]; [id number]   ORDERING CLINICIAN: SERINA GARNETT   TECHNIQUE: Axial T2, FLAIR, DWI, gradient echo T2 and  sagittal and coronal T1 weighted images of brain were acquired.   Time-of-flight MRA of the head  and neck was performed. The images were reviewed as source images and maximum intensity projections.   Additionally, fat saturated T1 weighted sequences were obtained throughout the neck under dissection protocol.   FINDINGS: Brain:   Patchy discontinuous foci of diffusion restriction are present within the geographic territory in the inferior medial right cerebellum (series 5, image 49), consistent with acute to early subacute infarct.   No additional areas of diffusion restriction are identified intracranially.   There is no evidence of acute to early subacute intracranial hemorrhage. No midline shift is identified. There may be slight local mass effect present in the right cerebellum at the area of the infarcts without evidence of hemorrhagic transformation.  No hemosiderin staining is present.   No ventricular dilatation is identified. Basal cisterns are patent. No extra-axial fluid collections are identified.   Scattered foci of hyperintense FLAIR and T2 signal are present in the periventricular and subcortical white matter of bilateral cerebral hemispheres are nonspecific, but favored to represent sequela of microvascular disease.   Small amount of mucus/secretions are present in the inferior maxillary sinuses. No abnormal fluid signal is present in the mastoid air cells.   MRA of head:   Intracranial carotid arteries demonstrate symmetric flow enhancement without evidence of occlusion or high-grade stenosis. Carotid terminals are symmetric in appearance.   Anterior cerebral arteries demonstrate symmetric flow enhancement proximally without evidence of occlusion.   No occlusion or high-grade stenosis is present in the M1 segment of the middle cerebral arteries or its more distal visualized M2 or M3 cortical branches.   No expected flow enhancement is identified in the expected location of the right vertebral artery. No occlusion is identified within the visualized left vertebral artery.   Basilar artery is diminutive in appearance, possibly in part due to posterior cerebral arteries being supplied by the posterior communicating arteries bilaterally. No occlusion is identified in the diminutive basilar artery or in the posterior cerebral arteries bilaterally.   MRA of neck:   MRA source images are somewhat degraded by motion.   Visualized common carotid arteries demonstrate symmetric flow enhancement proximally without evidence of occlusion.   Extracranial internal carotid arteries demonstrate somewhat tortuous course bilaterally without evidence of occlusion or stenosis.   There is asymmetrically diminished flow enhancement in the location of the right extracranial vertebral artery proximally compared to the contralateral left side, with no flow enhancement present in  the expected location of the vessel distally.   Fat sat T1 weighted images of the neck are degraded by motion, however they demonstrate asymmetrically increased T1 signal in the region of the lumen of the right vertebral artery compared to the contralateral left side (series 2, image 4) suggestive of underlying hematoma/dissection.       MRI Brain: 1.  Discontinuous nodular foci of diffusion restriction are present within the a geographic territory in the inferior medial right cerebellum, consistent with acute to early subacute infarcts. There is slight local mass effect without evidence of hemorrhagic transformation. 2. No supratentorial acute infarction is identified. 3. Scattered areas of hyperintense FLAIR and T2 signal are present in the periventricular and subcortical white matter of bilateral cerebral hemispheres are nonspecific, although favored to represent chronic sequela of microvascular disease.   MRA: 1.  Asymmetrically diminished flow enhancement is present in the expected location of the extracranial right vertebral artery compared to the contralateral left side proximally, with no flow enhancement identified in the vessel more distally. 2. Fat saturated T1 images of the neck are significantly degraded by motion, however they demonstrate asymmetric increased T1 signal within the lumen of the right vertebral artery compared to the contralateral left side suggestive of an underlying hematoma and dissection. 3. No significant stenosis is present in the carotid arteries in the neck or visualized more distal anterior or posterior intracranial circulation.   MACRO: None   Signed by: Hyacinth Segura 10/15/2024 9:47 PM Dictation workstation:   [id number]    MR neck soft tissue only wo IV contrast    Result Date: 10/15/2024  Interpreted By:  Hyacinth Segura, STUDY: MR BRAIN WO IV CONTRAST; MR ANGIO NECK WO IV CONTRAST; MR NECK SOFT TISSUE ONLY WO IV CONTRAST; MR ANGIO HEAD WO IV CONTRAST;   10/15/2024 8:50 pm   INDICATION: Signs/Symptoms:CVA work up; Signs/Symptoms:Concern for CVA or dissection; Signs/Symptoms:With T1 FAT SAT images to rule out dissection; Signs/Symptoms:concern for stroke or dissection.  Stroke protocol.     COMPARISON: Same day noncontrast CT head; same day CT of the head and neck;   ACCESSION NUMBER(S): [id number]; [id number]; [id number]; [id number]   ORDERING CLINICIAN: SERINA GARNETT   TECHNIQUE: Axial T2, FLAIR, DWI, gradient echo T2 and  sagittal and coronal T1 weighted images of brain were acquired.   Time-of-flight MRA of the head  and neck was performed. The images were reviewed as source images and maximum intensity projections.   Additionally, fat saturated T1 weighted sequences were obtained throughout the neck under dissection protocol.   FINDINGS: Brain:   Patchy discontinuous foci of diffusion restriction are present within the geographic territory in the inferior medial right cerebellum (series 5, image 49), consistent with acute to early subacute infarct.   No additional areas of diffusion restriction are identified intracranially.   There is no evidence of acute to early subacute intracranial hemorrhage. No midline shift is identified. There may be slight local mass effect present in the right cerebellum at the area of the infarcts without evidence of hemorrhagic transformation. No hemosiderin staining is present.   No ventricular dilatation is identified. Basal cisterns are patent. No extra-axial fluid collections are identified.   Scattered foci of hyperintense FLAIR and T2 signal are present in the periventricular and subcortical white matter of bilateral cerebral hemispheres are nonspecific, but favored to represent sequela of microvascular disease.   Small amount of mucus/secretions are present in the inferior maxillary sinuses. No abnormal fluid signal is present in the mastoid air cells.   MRA of head:   Intracranial carotid arteries  demonstrate symmetric flow enhancement without evidence of occlusion or high-grade stenosis. Carotid terminals are symmetric in appearance.   Anterior cerebral arteries demonstrate symmetric flow enhancement proximally without evidence of occlusion.   No occlusion or high-grade stenosis is present in the M1 segment of the middle cerebral arteries or its more distal visualized M2 or M3 cortical branches.   No expected flow enhancement is identified in the expected location of the right vertebral artery. No occlusion is identified within the visualized left vertebral artery.   Basilar artery is diminutive in appearance, possibly in part due to posterior cerebral arteries being supplied by the posterior communicating arteries bilaterally. No occlusion is identified in the diminutive basilar artery or in the posterior cerebral arteries bilaterally.   MRA of neck:   MRA source images are somewhat degraded by motion.   Visualized common carotid arteries demonstrate symmetric flow enhancement proximally without evidence of occlusion.   Extracranial internal carotid arteries demonstrate somewhat tortuous course bilaterally without evidence of occlusion or stenosis.   There is asymmetrically diminished flow enhancement in the location of the right extracranial vertebral artery proximally compared to the contralateral left side, with no flow enhancement present in the expected location of the vessel distally.   Fat sat T1 weighted images of the neck are degraded by motion, however they demonstrate asymmetrically increased T1 signal in the region of the lumen of the right vertebral artery compared to the contralateral left side (series 2, image 4) suggestive of underlying hematoma/dissection.       MRI Brain: 1.  Discontinuous nodular foci of diffusion restriction are present within the a geographic territory in the inferior medial right cerebellum, consistent with acute to early subacute infarcts. There is slight local mass  effect without evidence of hemorrhagic transformation. 2. No supratentorial acute infarction is identified. 3. Scattered areas of hyperintense FLAIR and T2 signal are present in the periventricular and subcortical white matter of bilateral cerebral hemispheres are nonspecific, although favored to represent chronic sequela of microvascular disease.   MRA: 1.  Asymmetrically diminished flow enhancement is present in the expected location of the extracranial right vertebral artery compared to the contralateral left side proximally, with no flow enhancement identified in the vessel more distally. 2. Fat saturated T1 images of the neck are significantly degraded by motion, however they demonstrate asymmetric increased T1 signal within the lumen of the right vertebral artery compared to the contralateral left side suggestive of an underlying hematoma and dissection. 3. No significant stenosis is present in the carotid arteries in the neck or visualized more distal anterior or posterior intracranial circulation.   MACRO: None   Signed by: Hyacinth Segura 10/15/2024 9:47 PM Dictation workstation:   [id number]    MR angio neck wo IV contrast    Result Date: 10/15/2024  Interpreted By:  Hyacinth Segura, STUDY: MR BRAIN WO IV CONTRAST; MR ANGIO NECK WO IV CONTRAST; MR NECK SOFT TISSUE ONLY WO IV CONTRAST; MR ANGIO HEAD WO IV CONTRAST;  10/15/2024 8:50 pm   INDICATION: Signs/Symptoms:CVA work up; Signs/Symptoms:Concern for CVA or dissection; Signs/Symptoms:With T1 FAT SAT images to rule out dissection; Signs/Symptoms:concern for stroke or dissection.  Stroke protocol.     COMPARISON: Same day noncontrast CT head; same day CT of the head and neck;   ACCESSION NUMBER(S): [id number]; [id number]; [id number]; [id number]   ORDERING CLINICIAN: SERINA GARNETT   TECHNIQUE: Axial T2, FLAIR, DWI, gradient echo T2 and  sagittal and coronal T1 weighted images of brain were acquired.   Time-of-flight  MRA of the head  and neck was performed. The images were reviewed as source images and maximum intensity projections.   Additionally, fat saturated T1 weighted sequences were obtained throughout the neck under dissection protocol.   FINDINGS: Brain:   Patchy discontinuous foci of diffusion restriction are present within the geographic territory in the inferior medial right cerebellum (series 5, image 49), consistent with acute to early subacute infarct.   No additional areas of diffusion restriction are identified intracranially.   There is no evidence of acute to early subacute intracranial hemorrhage. No midline shift is identified. There may be slight local mass effect present in the right cerebellum at the area of the infarcts without evidence of hemorrhagic transformation. No hemosiderin staining is present.   No ventricular dilatation is identified. Basal cisterns are patent. No extra-axial fluid collections are identified.   Scattered foci of hyperintense FLAIR and T2 signal are present in the periventricular and subcortical white matter of bilateral cerebral hemispheres are nonspecific, but favored to represent sequela of microvascular disease.   Small amount of mucus/secretions are present in the inferior maxillary sinuses. No abnormal fluid signal is present in the mastoid air cells.   MRA of head:   Intracranial carotid arteries demonstrate symmetric flow enhancement without evidence of occlusion or high-grade stenosis. Carotid terminals are symmetric in appearance.   Anterior cerebral arteries demonstrate symmetric flow enhancement proximally without evidence of occlusion.   No occlusion or high-grade stenosis is present in the M1 segment of the middle cerebral arteries or its more distal visualized M2 or M3 cortical branches.   No expected flow enhancement is identified in the expected location of the right vertebral artery. No occlusion is identified within the visualized left vertebral artery.    Basilar artery is diminutive in appearance, possibly in part due to posterior cerebral arteries being supplied by the posterior communicating arteries bilaterally. No occlusion is identified in the diminutive basilar artery or in the posterior cerebral arteries bilaterally.   MRA of neck:   MRA source images are somewhat degraded by motion.   Visualized common carotid arteries demonstrate symmetric flow enhancement proximally without evidence of occlusion.   Extracranial internal carotid arteries demonstrate somewhat tortuous course bilaterally without evidence of occlusion or stenosis.   There is asymmetrically diminished flow enhancement in the location of the right extracranial vertebral artery proximally compared to the contralateral left side, with no flow enhancement present in the expected location of the vessel distally.   Fat sat T1 weighted images of the neck are degraded by motion, however they demonstrate asymmetrically increased T1 signal in the region of the lumen of the right vertebral artery compared to the contralateral left side (series 2, image 4) suggestive of underlying hematoma/dissection.       MRI Brain: 1.  Discontinuous nodular foci of diffusion restriction are present within the a geographic territory in the inferior medial right cerebellum, consistent with acute to early subacute infarcts. There is slight local mass effect without evidence of hemorrhagic transformation. 2. No supratentorial acute infarction is identified. 3. Scattered areas of hyperintense FLAIR and T2 signal are present in the periventricular and subcortical white matter of bilateral cerebral hemispheres are nonspecific, although favored to represent chronic sequela of microvascular disease.   MRA: 1.  Asymmetrically diminished flow enhancement is present in the expected location of the extracranial right vertebral artery compared to the contralateral left side proximally, with no flow enhancement identified in the  vessel more distally. 2. Fat saturated T1 images of the neck are significantly degraded by motion, however they demonstrate asymmetric increased T1 signal within the lumen of the right vertebral artery compared to the contralateral left side suggestive of an underlying hematoma and dissection. 3. No significant stenosis is present in the carotid arteries in the neck or visualized more distal anterior or posterior intracranial circulation.   MACRO: None   Signed by: Hyacinth Segura 10/15/2024 9:47 PM Dictation workstation:   [id number]    MR angio head wo IV contrast    Result Date: 10/15/2024  Interpreted By:  Hyacinth Segura, STUDY: MR BRAIN WO IV CONTRAST; MR ANGIO NECK WO IV CONTRAST; MR NECK SOFT TISSUE ONLY WO IV CONTRAST; MR ANGIO HEAD WO IV CONTRAST;  10/15/2024 8:50 pm   INDICATION: Signs/Symptoms:CVA work up; Signs/Symptoms:Concern for CVA or dissection; Signs/Symptoms:With T1 FAT SAT images to rule out dissection; Signs/Symptoms:concern for stroke or dissection.  Stroke protocol.     COMPARISON: Same day noncontrast CT head; same day CT of the head and neck;   ACCESSION NUMBER(S): [id number]; [id number]; [id number]; [id number]   ORDERING CLINICIAN: SERINA GARNETT   TECHNIQUE: Axial T2, FLAIR, DWI, gradient echo T2 and  sagittal and coronal T1 weighted images of brain were acquired.   Time-of-flight MRA of the head  and neck was performed. The images were reviewed as source images and maximum intensity projections.   Additionally, fat saturated T1 weighted sequences were obtained throughout the neck under dissection protocol.   FINDINGS: Brain:   Patchy discontinuous foci of diffusion restriction are present within the geographic territory in the inferior medial right cerebellum (series 5, image 49), consistent with acute to early subacute infarct.   No additional areas of diffusion restriction are identified intracranially.   There is no evidence of acute to early  subacute intracranial hemorrhage. No midline shift is identified. There may be slight local mass effect present in the right cerebellum at the area of the infarcts without evidence of hemorrhagic transformation. No hemosiderin staining is present.   No ventricular dilatation is identified. Basal cisterns are patent. No extra-axial fluid collections are identified.   Scattered foci of hyperintense FLAIR and T2 signal are present in the periventricular and subcortical white matter of bilateral cerebral hemispheres are nonspecific, but favored to represent sequela of microvascular disease.   Small amount of mucus/secretions are present in the inferior maxillary sinuses. No abnormal fluid signal is present in the mastoid air cells.   MRA of head:   Intracranial carotid arteries demonstrate symmetric flow enhancement without evidence of occlusion or high-grade stenosis. Carotid terminals are symmetric in appearance.   Anterior cerebral arteries demonstrate symmetric flow enhancement proximally without evidence of occlusion.   No occlusion or high-grade stenosis is present in the M1 segment of the middle cerebral arteries or its more distal visualized M2 or M3 cortical branches.   No expected flow enhancement is identified in the expected location of the right vertebral artery. No occlusion is identified within the visualized left vertebral artery.   Basilar artery is diminutive in appearance, possibly in part due to posterior cerebral arteries being supplied by the posterior communicating arteries bilaterally. No occlusion is identified in the diminutive basilar artery or in the posterior cerebral arteries bilaterally.   MRA of neck:   MRA source images are somewhat degraded by motion.   Visualized common carotid arteries demonstrate symmetric flow enhancement proximally without evidence of occlusion.   Extracranial internal carotid arteries demonstrate somewhat tortuous course bilaterally without evidence of occlusion  or stenosis.   There is asymmetrically diminished flow enhancement in the location of the right extracranial vertebral artery proximally compared to the contralateral left side, with no flow enhancement present in the expected location of the vessel distally.   Fat sat T1 weighted images of the neck are degraded by motion, however they demonstrate asymmetrically increased T1 signal in the region of the lumen of the right vertebral artery compared to the contralateral left side (series 2, image 4) suggestive of underlying hematoma/dissection.       MRI Brain: 1.  Discontinuous nodular foci of diffusion restriction are present within the a geographic territory in the inferior medial right cerebellum, consistent with acute to early subacute infarcts. There is slight local mass effect without evidence of hemorrhagic transformation. 2. No supratentorial acute infarction is identified. 3. Scattered areas of hyperintense FLAIR and T2 signal are present in the periventricular and subcortical white matter of bilateral cerebral hemispheres are nonspecific, although favored to represent chronic sequela of microvascular disease.   MRA: 1.  Asymmetrically diminished flow enhancement is present in the expected location of the extracranial right vertebral artery compared to the contralateral left side proximally, with no flow enhancement identified in the vessel more distally. 2. Fat saturated T1 images of the neck are significantly degraded by motion, however they demonstrate asymmetric increased T1 signal within the lumen of the right vertebral artery compared to the contralateral left side suggestive of an underlying hematoma and dissection. 3. No significant stenosis is present in the carotid arteries in the neck or visualized more distal anterior or posterior intracranial circulation.   MACRO: None   Signed by: Hyacinth Segura 10/15/2024 9:47 PM Dictation workstation:   [id number]    CT brain attack angio head and neck  W and WO IV contrast    Result Date: 10/15/2024  Interpreted By:  Jerod Gonzalez, STUDY: CT BRAIN ATTACK ANGIO HEAD AND NECK W AND WO IV CONTRAST; ; 10/15/2024 3:15 pm   INDICATION: Signs/Symptoms:right sided face weakness, NIH stroke scale zero.     COMPARISON: CT head from 10/15/2024.   ACCESSION NUMBER(S): [id number]   ORDERING CLINICIAN: GILDA MCKEON   TECHNIQUE: CTA of the head and neck was performed after administration of 80 mL Omnipaque 350 intravenously. Segmented MIPs are provided.   FINDINGS: CTA neck:   The aortic arch and origins of the great vessels arising from the aortic arch are without luminal narrowing. There is tortuosity of the bilateral common carotid arteries and there is no luminal narrowing.   There is minimal atherosclerotic calcification located within the right carotid bulb. There is 0% luminal narrowing of the bilateral carotid bulbs, according to NASCET criteria. There is no narrowing of the bilateral internal carotid arteries.   Bilateral vertebral arteries arise from the subclavian arteries. There is no narrowing of the left vertebral artery.   Right vertebral artery arises from the right subclavian arteries. Contrast related enhancement is seen within the V1 and V2 segments of the right vertebral artery. There is diminished contrast related enhancement within the V2 V3 junction of the right vertebral artery with diminished contrast related enhancement located within the V3 segment. No striking contrast related enhancement is seen within the superior-most portion of the V3 segment of the right vertebral artery.   CTA head:   There are atherosclerotic calcifications located within the paraophthalmic segments of the bilateral internal carotid arteries causing no luminal narrowing. There is no narrowing of the visualized portions of the bilateral internal carotid arteries. There is no narrowing of the visualized portions of the bilateral anterior or middle cerebral arteries.   The  left intradural vertebral artery is somewhat decreased in luminal caliber. The right intradural vertebral artery demonstrates essentially no contrast related enhancement until the distal most portion at the vertebrobasilar junction. The basilar artery is mildly diffusely decreased in luminal caliber and contains a fenestration within the basilar tip.   There are prominent bilateral posterior communicating arteries which appear to primarily supply the bilateral posterior cerebral arteries. There is no striking narrowing of the visualized portions of the bilateral posterior cerebral arteries.   There are no aneurysms.   Additional findings:   There are osseous degenerative changes of the cervical spine. The thyroid gland is heterogeneous in appearance and there is a partially calcified 1 cm nodule located within the posterior portion of the right thyroid lobe.       CTA neck:   1. Markedly diminished contrast related enhancement located within the V2/V3 segment of the right vertebral artery with gradual loss of contrast related enhancement within the V3 segment. On the prior MRI cervical spine from 2020, flow voids were seen within the right vertebral artery within these regions, therefore these findings could reflect a sequela of an age indeterminate dissection involving the V3 segment of the right vertebral artery. Consider further evaluation with MRI of the brain and dissection protocol.   2. Otherwise, no narrowing of the visualized arteries in the neck.   CTA head:   1. No contrast related enhancement located within the right intradural vertebral artery until the vertebrobasilar junction.   2. Left intradural vertebral artery and basilar artery are diffusely decreased in luminal caliber, possibly developmental variant as there are prominent bilateral posterior communicating arteries which appear to primarily supply the bilateral posterior cerebral arteries.   3. Otherwise, no striking narrowing of the visualized  arteries in the head.   This study was interpreted at [facility].     MACRO: Vy Gonzalez discussed the significance and urgency of this critical finding by telephone with  Dr. Hui on 10/15/2024 at 3:47 pm. (**-RCF-**) Findings:  See findings.   e   Signed by: Jerod Gonzalez 10/15/2024 3:48 PM Dictation workstation:   [id number]    CT brain attack head wo IV contrast    Result Date: 10/15/2024  Interpreted By:  Filomena Aquino, STUDY: CT BRAIN ATTACK HEAD WO IV CONTRAST;  10/15/2024 2:58 pm   INDICATION: Signs/Symptoms:Stroke Evaluation.   COMPARISON: None   ACCESSION NUMBER(S): [id number]   ORDERING CLINICIAN: GILDA MCKEON   TECHNIQUE: Examination was performed in the axial plane with sagittal and coronal reconstructions.   FINDINGS: INTRACRANIAL: There is prominence of the ventricular system and cerebral sulci consistent with cerebral atrophy. No mass or mass effect is identified. There is no hemorrhage or subdural fluid collection. There is no acute infarct.     EXTRACRANIAL: Visualized paranasal sinuses and mastoids are clear.       No acute intracranial pathology.   MACRO: Filomena Aquino discussed the significance and urgency of this critical finding plate secure chat with  GILDA MCKEON on 10/15/2024 at 3:23 pm.  (**-RCF-**) Findings:  See findings.   Signed by: Filomena Aquino 10/15/2024 3:23 PM Dictation workstation:   [id number]               Assessment/Plan   Principal Problem:    Facial paresthesia  Active Problems:    Cerebral infarction due to occlusion of right vertebral artery (Multi)  Evaluated imaging including carotid duplex with Dr. Smith.  Patient was evaluated at bedside and patient case was discussed.  Patient's overall symptoms have reduced.  No planned surgical intervention from vascular surgery at this time.  Vertebral artery intervention is not a procedure that our vascular surgery department performs.  Patient will require neuro  interventional radiologist at [facility] if symptoms increase.  Patient did have extraction of tooth upper back molar approximately 3 weeks ago which may or may not have a contributing factor.  Patient to follow-up with Dr. Smith in approximately 3 months.    Patient's carotid duplex corroborates CTA findings less than 50% stenosis.  Right vertebral distal stenosis was noted.  Patient is asymptomatic of subclavian stenosis and has adequate arterial perfusion.  Continue PT and OT.  Continue daily antiplatelet therapy, maximize statin, and antihypertensive medication for  CVA and Carotid stenosis prophylaxis and vertebral artery dissection treatment. There was a shared discussion with the patient to continue a lifestyle modification that promotes: The adherence to strict BP and glycemic control, healthy dietary habit changes, incorporation of daily exercise regimen, adherence to all prescription/OTC medication schedules, attendance to all follow-up appointments, cessation from smoking , abstinence from alcohol and illicit drug use if applicable.  Patient can follow-up with Dr. Smith in 3 months this information was placed in discharge.  From a vascular surgery standpoint there are no objections for this patient to be discharged.    Thank you very much for allowing Vascular Surgery to be involved in the care of your patient sincerely Derek LOPEZ-CNP .  (This note was generated with voice recognition software and may contain errors including spelling, grammar, syntax and missed recognition of what was dictated, of which may not have been fully corrected)    JESSICA Freed-CNP

## 2024-10-17 NOTE — DISCHARGE SUMMARY
"                                                                 Hospital Medicine Discharge Summary       Patient Name: Jahaira Moore   YOB: 1955    Discharge Diagnosis: CVA, vertebral artery dissection   Discharge Date: 10/17/24  Discharge Location: Acute Rehab    Hospital Course:    Jahaira Moore is a 69 y.o. female with a PMHx of osteoporosis who presented to [facility] on 10/16/2024 with a chief complaint of nausea/vomiting, right facial paresthesia and gait instability. CT head on arrival was negative for infarct or hemorrhage.  CT angiography of the neck was concerning for right vertebral artery occlusion.  Subsequent brain MRI showed a acute to subacute infarct in the right cerebellum and MRI seems to confirm the right vertebral artery dissection/thrombus.  Patient was seen by neurology and vascular surgery.  She was initiated on dual antiplatelet therapy as well as a high intensity statin.  Her symptoms of improved quite drastically.  She continues to have some right facial paresthesia and gait instability and is eager to work with physical therapy to regain her function.  She needs neurology follow-up in about 3 months and has vascular follow-up in about the same amount of time.  This information was placed on discharge paperwork.  She will be discharged on dual antiplatelet therapy and high intensity statin which she should continue for at least the next 3 months according to neurology.  She had chlorthalidone listed on her home medications, not sure she was taking it.  While she has been here she was not on this medication & her blood pressure has been in excellent control, so this medication will be discontinued.    Time Spent: 39 minutes      Physical Exam:     /75   Pulse 69   Temp 35.7 °C (96.3 °F)   Resp 20   Ht 1.6 m (5' 3\")   Wt 58.1 kg (128 lb)   SpO2 92%   BMI 22.67 kg/m²     GENERAL: well developed, non-toxic, NAD, alert & cooperative  HEENT: normocephalic, atraumatic, sclera " clear  CARDIAC: regular rate & rhythm, S1/S2  PULMONARY: CTA b/l, no respiratory distress, - wheezes  ABDOMEN: soft, non-tender, non-distended  MSK: no peripheral edema, no obvious deformity  NEURO: A&O X 3, R V2-V3 distribution numbness  SKIN: Warm and dry, no lesions, no rashes.  PSYCH: appropriate mood & affect      Discharge Medications:     Your medication list        START taking these medications        Instructions Last Dose Given Next Dose Due   aspirin 81 mg EC tablet  Start taking on: October 18, 2024      Take 1 tablet (81 mg) by mouth once daily.       atorvastatin 40 mg tablet  Commonly known as: Lipitor      Take 1 tablet (40 mg) by mouth once daily at bedtime.       clopidogrel 75 mg tablet  Commonly known as: Plavix  Start taking on: October 18, 2024      Take 1 tablet (75 mg) by mouth once daily.              CONTINUE taking these medications        Instructions Last Dose Given Next Dose Due   ARIPiprazole 2 mg tablet  Commonly known as: Abilify           buPROPion  mg 24 hr tablet  Commonly known as: Wellbutrin XL           cholecalciferol 25 MCG (1000 UT) capsule  Commonly known as: Vitamin D-3           DULoxetine 60 mg DR capsule  Commonly known as: Cymbalta           DULoxetine 30 mg DR capsule  Commonly known as: Cymbalta           gabapentin 300 mg capsule  Commonly known as: Neurontin           Kevzara 200 mg/1.14 mL injection  Generic drug: sarilumab           magnesium oxide 500 mg magnesium tablet           pantoprazole 40 mg EC tablet  Commonly known as: ProtoNix      TAKE 1 TABLET BY MOUTH DAILY       predniSONE 5 mg tablet  Commonly known as: Deltasone           predniSONE 1 mg tablet  Commonly known as: Deltasone           traZODone 50 mg tablet  Commonly known as: Desyrel                  STOP taking these medications      calcium carbonate-vitamin D3 500 mg-5 mcg (200 unit) tablet        chlorthalidone 25 mg tablet  Commonly known as: [place]                  Where to Get  Your Medications        Information about where to get these medications is not yet available    Ask your nurse or doctor about these medications  aspirin 81 mg EC tablet  atorvastatin 40 mg tablet  clopidogrel 75 mg tablet               Carlos Zimmer, DO  [facility]

## 2024-10-17 NOTE — PROGRESS NOTES
Met with patient at bedside. Patient agreeable to Ila Gongora requested and started by direct precert team on 10/16.

## 2024-10-17 NOTE — CARE PLAN
The patient's goals for the shift include      The clinical goals for the shift include pt to remain HDS and free of falls during this shift    Over the shift, the patient did make progress toward the following goals.

## 2024-10-17 NOTE — PROGRESS NOTES
Physical Therapy    Physical Therapy Treatment    Patient Name: Jahaira Moore  MRN: [id number]  Today's Date: 10/17/2024  Time Calculation  Start Time: 1119  Stop Time: 1143  Time Calculation (min): 24 min     635/635-A    Assessment/Plan   PT Assessment  PT Assessment Results: Impaired balance  Rehab Prognosis: Good  Evaluation/Treatment Tolerance: Patient tolerated treatment well  Medical Staff Made Aware: Yes  Strengths: Ability to acquire knowledge  End of Session Communication: Bedside nurse  Assessment Comment: pt tolerated session. pt required modAx1 with mobility to maintain safety d/t decreased balance. pt would benefit from high int therapy in order to return to PLOF.  End of Session Patient Position: Bed, 2 rail up, Alarm on (call light in reach)     PT Plan  Treatment/Interventions: Bed mobility, Gait training, Transfer training, Balance training, Stair training, Therapeutic exercise, Therapeutic activity, Strengthening  PT Plan: Ongoing PT  PT Frequency: 5 times per week  PT Discharge Recommendations: High intensity level of continued care  PT Recommended Transfer Status: Assist x1  PT - OK to Discharge: Yes (once medically cleared)    Current Problem:  Patient Active Problem List   Diagnosis    Routine general medical examination at health care facility    Need for vaccination    Personal history of tobacco use, presenting hazards to health    Screening for cardiovascular condition    Ganglion cyst    Agitated depression (Multi)    Cerebral infarction due to occlusion of right vertebral artery (Multi)    Facial paresthesia       General Visit Information:   PT  Visit  PT Received On: 10/17/24  Response to Previous Treatment: Patient with no complaints from previous session.  General  Reason for Referral: PT eval and tx  Referred By: DO Goran  Past Medical History Relevant to Rehab: Osteoporosis  Co-Treatment: OT  Co-Treatment Reason: to maximize pt safety and mobility  Prior to Session Communication:  Bedside nurse  Patient Position Received: Bed, 2 rail up, Alarm on  Subjective     Precautions:  Precautions  Precautions Comment: aspiration, falls, OOB with assist       Objective     Pain:  Pain Assessment  Pain Assessment:  (8/10 chronic LBP, RN notified)  0-10 (Numeric) Pain Score: 0 - No pain    Cognition:  Cognition  Orientation Level: Oriented X4           Treatments:           Bed Mobility  Bed Mobility:  (sup <>sit with SBA)  Ambulation/Gait Training  Ambulation/Gait Training Performed:  (amb of 300 ft with w/w and modAx1. v/c for safety and sequencing. uncordinated while amb and having difficulty advancing limbs. 3 seated rest breaks through amb. pt needed max A x1 for stand to sit  to prevent fall for 1 of her seated rest breaks)  Transfers  Transfer:  (STS from bed with minAx1 and w/w.)          Outcome Measures:     [facility] Basic Mobility  Turning from your back to your side while in a flat bed without using bedrails: None  Moving from lying on your back to sitting on the side of a flat bed without using bedrails: A little  Moving to and from bed to chair (including a wheelchair): A little  Standing up from a chair using your arms (e.g. wheelchair or bedside chair): A little  To walk in hospital room: A lot  Climbing 3-5 steps with railing: Total  Basic Mobility - Total Score: 16                 Education Documentation  Mobility Training, taught by Andreina Adan PT at 10/17/2024  1:57 PM.  Learner: Patient  Readiness: Acceptance  Method: Explanation  Response: Verbalizes Understanding    Education Comments  No comments found.           EDUCATION:     Encounter Problems       Encounter Problems (Active)       PT Problem       STG -  Pt will navigate 12 stairs using rail with minAx1  (Progressing)       Start:  10/16/24    Expected End:  10/30/24            STG - Pt will transfer STS with SBA  (Met)       Start:  10/16/24    Expected End:  10/30/24    Resolved:  10/17/24         STG - Pt will transition  supine <> sitting IND  (Progressing)       Start:  10/16/24    Expected End:  10/30/24            STG - Pt will amb 150' using w/w with SBA  (Progressing)       Start:  10/16/24    Expected End:  10/30/24

## 2024-10-17 NOTE — PROGRESS NOTES
Occupational Therapy    OT Treatment    Patient Name: Jahaira Moore  MRN: [id number]  Department: [facility] VASCULAR  Room: [facility]635-A  Today's Date: 10/17/2024  Time Calculation  Start Time: 1118  Stop Time: 1142  Time Calculation (min): 24 min        Assessment:  End of Session Communication: Bedside nurse  End of Session Patient Position: Up in chair, Alarm on (call light in reach)  OT Assessment Results: Decreased ADL status, Decreased upper extremity strength, Decreased safe judgment during ADL, Decreased endurance, Decreased functional mobility  Strengths: Insight into problems  Barriers to Participation: Comorbidities  Plan:  Treatment Interventions: ADL retraining, UE strengthening/ROM, Functional transfer training, Endurance training, Patient/family training, Equipment evaluation/education, Neuromuscular reeducation, Fine motor coordination activities, Compensatory technique education  OT Frequency: 5 times per week  OT Discharge Recommendations: High intensity level of continued care  OT - OK to Discharge: Yes (once medically appropriate to next level of care)  Treatment Interventions: ADL retraining, UE strengthening/ROM, Functional transfer training, Endurance training, Patient/family training, Equipment evaluation/education, Neuromuscular reeducation, Fine motor coordination activities, Compensatory technique education    Subjective   Previous Visit Info:  OT Last Visit  OT Received On: 10/17/24  General:  General  Co-Treatment: PT  Co-Treatment Reason: for safety and to maximize therapeutic performance  Prior to Session Communication: Bedside nurse  Patient Position Received: Bed, 2 rail up, Alarm on  General Comment: patient pleasant and cooperative; however, declining ADLs as reports already completed this AM  Precautions:  Medical Precautions: Fall precautions (alarms, aspiration precautions, tele, OOB with assist)      Pain:  Pain Assessment  Pain Assessment:  (patient reports chronic back pain, did not  rate)    Objective    Cognition:  Cognition  Overall Cognitive Status: Within Functional Limits    Bed Mobility/Transfers: Bed Mobility  Bed Mobility:  (completed supine <-->sit with SBA with use of bed rail and increased time)    Transfers  Transfer:  (completed STS with MIN A x 1 with WW)      Functional Mobility:  Functional Mobility  Functional Mobility Performed:  (patient engaged in extensive bout of simple mobility around nursing unit with MOD A x 1 overall with support of WW and chair follow for safety. patient with difficulty advancing LLE noted and also difficulty with motor planning overall. Requires cues throughout for proper body mechanics and proper hand/foot placement with fair-poor carryover overall. Patient requiring 2 seated rest breaks throughout task. First seated rest break, patient with LOB, requiring MAX A to safely lower to chair. Patient completing to increase safety/independence with mobility for ADLs/IADLs.          Outcome Measures:[facility] Daily Activity  Putting on and taking off regular lower body clothing: A lot  Bathing (including washing, rinsing, drying): A lot  Putting on and taking off regular upper body clothing: A little  Toileting, which includes using toilet, bedpan or urinal: A lot  Taking care of personal grooming such as brushing teeth: A little  Eating Meals: A little  Daily Activity - Total Score: 15        Education Documentation  Body Mechanics, taught by Mary Handy OT at 10/17/2024  2:23 PM.  Learner: Patient  Readiness: Acceptance  Method: Explanation  Response: Verbalizes Understanding, Needs Reinforcement    ADL Training, taught by Mary Handy OT at 10/17/2024  2:23 PM.  Learner: Patient  Readiness: Acceptance  Method: Explanation  Response: Verbalizes Understanding, Needs Reinforcement    Education Comments  No comments found.        OP EDUCATION:       Goals:  Encounter Problems       Encounter Problems (Active)       OT Goals       STG- patient will  complete LB dressing with SBA with use of ae/ad/dme prn (Progressing)       Start:  10/16/24    Expected End:  10/30/24            STG- patient will complete toileting with SBA with use of ae/ad/dme prn (Progressing)       Start:  10/16/24    Expected End:  10/30/24            STG- patient will complete transfers to/from bed, chair, commode at SBA with use of ae/ad/dme prn (Progressing)       Start:  10/16/24    Expected End:  10/30/24            STG- patient will complete simple mobility with SBA with use of ae/ad/dme prn (Progressing)       Start:  10/16/24    Expected End:  10/30/24            STG- patient will complete grooming at SBA with use of ae/ad/dme prn (Progressing)       Start:  10/16/24    Expected End:  10/30/24

## 2024-10-17 NOTE — CARE PLAN
The patient's goals for the shift include      The clinical goals for the shift include pt to remain HDS and free of falls during this shift    Over the shift, the patient did not make progress toward the following goals. Barriers to progression include n/a. Recommendations to address these barriers include n/a.

## 2024-10-17 NOTE — PROGRESS NOTES
10/17/24 1020   Discharge Planning   Living Arrangements Alone   Support Systems Family members   Assistance Needed cane in community   Type of Residence Private residence   Number of Stairs to Enter Residence 5   Number of Stairs Within Residence 12   Do you have animals or pets at home? Yes   Type of Animals or Pets cat   Who is requesting discharge planning? Provider   Home or Post Acute Services Post acute facilities (Rehab/SNF/etc)   Type of Post Acute Facility Services Rehab   Expected Discharge Disposition Rehab   Does the patient need discharge transport arranged? Yes   RoundTrip coordination needed? Yes   Has discharge transport been arranged? No     Pt has been accepted to [facility] and precert was submitted. [facility] 15/15 per therapy. ADOD is 10/17.  Pending auth from insurance at this time. CT to follow. Celia Almeida BSN/RN-TCC

## 2024-10-18 ENCOUNTER — HOSPITAL ENCOUNTER (INPATIENT)
Facility: HOSPITAL | Age: 69
DRG: 057 | End: 2024-10-18
Attending: PHYSICAL MEDICINE & REHABILITATION | Admitting: PHYSICAL MEDICINE & REHABILITATION
Payer: MEDICARE

## 2024-10-18 VITALS
BODY MASS INDEX: 22.68 KG/M2 | WEIGHT: 128 LBS | HEIGHT: 63 IN | SYSTOLIC BLOOD PRESSURE: 121 MMHG | DIASTOLIC BLOOD PRESSURE: 68 MMHG | HEART RATE: 73 BPM | TEMPERATURE: 97 F | OXYGEN SATURATION: 94 % | RESPIRATION RATE: 19 BRPM

## 2024-10-18 DIAGNOSIS — I63.211 CEREBROVASCULAR ACCIDENT (CVA) DUE TO OCCLUSION OF RIGHT VERTEBRAL ARTERY (MULTI): ICD-10-CM

## 2024-10-18 DIAGNOSIS — N18.2 CHRONIC RENAL IMPAIRMENT, STAGE 2 (MILD): ICD-10-CM

## 2024-10-18 DIAGNOSIS — I63.9 CEREBRAL INFARCTION, UNSPECIFIED: ICD-10-CM

## 2024-10-18 DIAGNOSIS — S22.000A COMPRESSION FRACTURE OF BODY OF THORACIC VERTEBRA (MULTI): Primary | ICD-10-CM

## 2024-10-18 DIAGNOSIS — S63.501A SPRAIN OF RIGHT WRIST, INITIAL ENCOUNTER: ICD-10-CM

## 2024-10-18 LAB
GLUCOSE BLD MANUAL STRIP-MCNC: 103 MG/DL (ref 74–99)
GLUCOSE BLD MANUAL STRIP-MCNC: 106 MG/DL (ref 74–99)
GLUCOSE BLD MANUAL STRIP-MCNC: 112 MG/DL (ref 74–99)

## 2024-10-18 PROCEDURE — 2500000001 HC RX 250 WO HCPCS SELF ADMINISTERED DRUGS (ALT 637 FOR MEDICARE OP)

## 2024-10-18 PROCEDURE — 2500000004 HC RX 250 GENERAL PHARMACY W/ HCPCS (ALT 636 FOR OP/ED)

## 2024-10-18 PROCEDURE — 1180000001 HC REHAB PRIVATE ROOM DAILY

## 2024-10-18 PROCEDURE — 82947 ASSAY GLUCOSE BLOOD QUANT: CPT

## 2024-10-18 PROCEDURE — 2500000001 HC RX 250 WO HCPCS SELF ADMINISTERED DRUGS (ALT 637 FOR MEDICARE OP): Performed by: INTERNAL MEDICINE

## 2024-10-18 PROCEDURE — 2500000004 HC RX 250 GENERAL PHARMACY W/ HCPCS (ALT 636 FOR OP/ED): Performed by: INTERNAL MEDICINE

## 2024-10-18 PROCEDURE — 99232 SBSQ HOSP IP/OBS MODERATE 35: CPT | Performed by: INTERNAL MEDICINE

## 2024-10-18 PROCEDURE — 97110 THERAPEUTIC EXERCISES: CPT | Mod: GP,CQ

## 2024-10-18 PROCEDURE — 97116 GAIT TRAINING THERAPY: CPT | Mod: GP,CQ

## 2024-10-18 PROCEDURE — 99221 1ST HOSP IP/OBS SF/LOW 40: CPT | Performed by: NEUROLOGICAL SURGERY

## 2024-10-18 PROCEDURE — 2500000001 HC RX 250 WO HCPCS SELF ADMINISTERED DRUGS (ALT 637 FOR MEDICARE OP): Performed by: PHYSICAL MEDICINE & REHABILITATION

## 2024-10-18 RX ORDER — GABAPENTIN 300 MG/1
300 CAPSULE ORAL 3 TIMES DAILY
Status: DISCONTINUED | OUTPATIENT
Start: 2024-10-18 | End: 2024-10-30 | Stop reason: HOSPADM

## 2024-10-18 RX ORDER — DULOXETIN HYDROCHLORIDE 30 MG/1
90 CAPSULE, DELAYED RELEASE ORAL DAILY
Status: DISCONTINUED | OUTPATIENT
Start: 2024-10-19 | End: 2024-10-30 | Stop reason: HOSPADM

## 2024-10-18 RX ORDER — BUPROPION HYDROCHLORIDE 150 MG/1
300 TABLET ORAL DAILY
Status: DISCONTINUED | OUTPATIENT
Start: 2024-10-19 | End: 2024-10-30 | Stop reason: HOSPADM

## 2024-10-18 RX ORDER — TALC
3 POWDER (GRAM) TOPICAL NIGHTLY PRN
Status: DISCONTINUED | OUTPATIENT
Start: 2024-10-18 | End: 2024-10-30 | Stop reason: HOSPADM

## 2024-10-18 RX ORDER — ALUMINUM HYDROXIDE, MAGNESIUM HYDROXIDE, AND SIMETHICONE 2400; 240; 2400 MG/30ML; MG/30ML; MG/30ML
30 SUSPENSION ORAL EVERY 6 HOURS PRN
Status: DISCONTINUED | OUTPATIENT
Start: 2024-10-18 | End: 2024-10-30 | Stop reason: HOSPADM

## 2024-10-18 RX ORDER — SENNOSIDES 8.6 MG/1
2 TABLET ORAL NIGHTLY
Status: DISCONTINUED | OUTPATIENT
Start: 2024-10-18 | End: 2024-10-21

## 2024-10-18 RX ORDER — AMOXICILLIN 250 MG
2 CAPSULE ORAL NIGHTLY PRN
Status: DISCONTINUED | OUTPATIENT
Start: 2024-10-18 | End: 2024-10-30 | Stop reason: HOSPADM

## 2024-10-18 RX ORDER — ATORVASTATIN CALCIUM 40 MG/1
40 TABLET, FILM COATED ORAL NIGHTLY
Status: DISCONTINUED | OUTPATIENT
Start: 2024-10-18 | End: 2024-10-30 | Stop reason: HOSPADM

## 2024-10-18 RX ORDER — ENOXAPARIN SODIUM 100 MG/ML
40 INJECTION SUBCUTANEOUS EVERY 24 HOURS
Status: DISCONTINUED | OUTPATIENT
Start: 2024-10-18 | End: 2024-10-23

## 2024-10-18 RX ORDER — PREDNISONE 5 MG/1
5 TABLET ORAL DAILY
Status: DISCONTINUED | OUTPATIENT
Start: 2024-10-19 | End: 2024-10-30 | Stop reason: HOSPADM

## 2024-10-18 RX ORDER — PREDNISONE 1 MG/1
1 TABLET ORAL DAILY
Status: DISCONTINUED | OUTPATIENT
Start: 2024-10-19 | End: 2024-10-30 | Stop reason: HOSPADM

## 2024-10-18 RX ORDER — ACETAMINOPHEN 325 MG/1
650 TABLET ORAL EVERY 4 HOURS PRN
Status: DISCONTINUED | OUTPATIENT
Start: 2024-10-18 | End: 2024-10-30 | Stop reason: HOSPADM

## 2024-10-18 RX ORDER — ASPIRIN 81 MG/1
81 TABLET ORAL DAILY
Status: DISCONTINUED | OUTPATIENT
Start: 2024-10-19 | End: 2024-10-30 | Stop reason: HOSPADM

## 2024-10-18 RX ORDER — TRAZODONE HYDROCHLORIDE 50 MG/1
50 TABLET ORAL NIGHTLY
Status: DISCONTINUED | OUTPATIENT
Start: 2024-10-18 | End: 2024-10-30 | Stop reason: HOSPADM

## 2024-10-18 RX ORDER — PANTOPRAZOLE SODIUM 40 MG/1
40 TABLET, DELAYED RELEASE ORAL DAILY
Status: DISCONTINUED | OUTPATIENT
Start: 2024-10-19 | End: 2024-10-30 | Stop reason: HOSPADM

## 2024-10-18 RX ORDER — CLOPIDOGREL BISULFATE 75 MG/1
75 TABLET ORAL DAILY
Status: DISCONTINUED | OUTPATIENT
Start: 2024-10-19 | End: 2024-10-30 | Stop reason: HOSPADM

## 2024-10-18 RX ORDER — BISACODYL 10 MG/1
10 SUPPOSITORY RECTAL DAILY
Status: DISCONTINUED | OUTPATIENT
Start: 2024-10-19 | End: 2024-10-21

## 2024-10-18 RX ORDER — DULOXETIN HYDROCHLORIDE 30 MG/1
90 CAPSULE, DELAYED RELEASE ORAL DAILY
Status: DISCONTINUED | OUTPATIENT
Start: 2024-10-18 | End: 2024-10-18 | Stop reason: HOSPADM

## 2024-10-18 RX ORDER — CHOLECALCIFEROL (VITAMIN D3) 25 MCG
1000 TABLET ORAL DAILY
Status: DISCONTINUED | OUTPATIENT
Start: 2024-10-18 | End: 2024-10-30 | Stop reason: HOSPADM

## 2024-10-18 SDOH — SOCIAL STABILITY: SOCIAL INSECURITY: ARE YOU OR HAVE YOU BEEN THREATENED OR ABUSED PHYSICALLY, EMOTIONALLY, OR SEXUALLY BY ANYONE?: NO

## 2024-10-18 SDOH — SOCIAL STABILITY: SOCIAL INSECURITY: HAS ANYONE EVER THREATENED TO HURT YOUR FAMILY OR YOUR PETS?: NO

## 2024-10-18 SDOH — SOCIAL STABILITY: SOCIAL INSECURITY: ARE THERE ANY APPARENT SIGNS OF INJURIES/BEHAVIORS THAT COULD BE RELATED TO ABUSE/NEGLECT?: NO

## 2024-10-18 SDOH — SOCIAL STABILITY: SOCIAL INSECURITY: ABUSE: ADULT

## 2024-10-18 SDOH — SOCIAL STABILITY: SOCIAL INSECURITY: DO YOU FEEL UNSAFE GOING BACK TO THE PLACE WHERE YOU ARE LIVING?: NO

## 2024-10-18 SDOH — SOCIAL STABILITY: SOCIAL INSECURITY: HAVE YOU HAD ANY THOUGHTS OF HARMING ANYONE ELSE?: NO

## 2024-10-18 SDOH — SOCIAL STABILITY: SOCIAL INSECURITY: HAVE YOU HAD THOUGHTS OF HARMING ANYONE ELSE?: YES

## 2024-10-18 SDOH — SOCIAL STABILITY: SOCIAL INSECURITY: DO YOU FEEL ANYONE HAS EXPLOITED OR TAKEN ADVANTAGE OF YOU FINANCIALLY OR OF YOUR PERSONAL PROPERTY?: NO

## 2024-10-18 SDOH — SOCIAL STABILITY: SOCIAL INSECURITY: DOES ANYONE TRY TO KEEP YOU FROM HAVING/CONTACTING OTHER FRIENDS OR DOING THINGS OUTSIDE YOUR HOME?: NO

## 2024-10-18 ASSESSMENT — LIFESTYLE VARIABLES
SKIP TO QUESTIONS 9-10: 1
AUDIT-C TOTAL SCORE: 0
HOW OFTEN DO YOU HAVE A DRINK CONTAINING ALCOHOL: NEVER
HOW OFTEN DO YOU HAVE 6 OR MORE DRINKS ON ONE OCCASION: NEVER
SUBSTANCE_ABUSE_PAST_12_MONTHS: NO
AUDIT-C TOTAL SCORE: 0
PRESCIPTION_ABUSE_PAST_12_MONTHS: NO
HOW MANY STANDARD DRINKS CONTAINING ALCOHOL DO YOU HAVE ON A TYPICAL DAY: PATIENT DOES NOT DRINK

## 2024-10-18 ASSESSMENT — COGNITIVE AND FUNCTIONAL STATUS - GENERAL
WALKING IN HOSPITAL ROOM: A LITTLE
MOBILITY SCORE: 23
STANDING UP FROM CHAIR USING ARMS: A LITTLE
MOVING TO AND FROM BED TO CHAIR: A LITTLE
MOBILITY SCORE: 17
DAILY ACTIVITIY SCORE: 24
TURNING FROM BACK TO SIDE WHILE IN FLAT BAD: A LITTLE
CLIMB 3 TO 5 STEPS WITH RAILING: A LITTLE
CLIMB 3 TO 5 STEPS WITH RAILING: TOTAL

## 2024-10-18 ASSESSMENT — PAIN SCALES - GENERAL
PAINLEVEL_OUTOF10: 8
PAINLEVEL_OUTOF10: 7
PAINLEVEL_OUTOF10: 7
PAINLEVEL_OUTOF10: 4
PAINLEVEL_OUTOF10: 0 - NO PAIN

## 2024-10-18 ASSESSMENT — PATIENT HEALTH QUESTIONNAIRE - PHQ9
2. FEELING DOWN, DEPRESSED OR HOPELESS: NOT AT ALL
2. FEELING DOWN, DEPRESSED OR HOPELESS: NOT AT ALL
SUM OF ALL RESPONSES TO PHQ9 QUESTIONS 1 & 2: 0
SUM OF ALL RESPONSES TO PHQ9 QUESTIONS 1 & 2: 0
1. LITTLE INTEREST OR PLEASURE IN DOING THINGS: NOT AT ALL
1. LITTLE INTEREST OR PLEASURE IN DOING THINGS: NOT AT ALL

## 2024-10-18 ASSESSMENT — PAIN - FUNCTIONAL ASSESSMENT
PAIN_FUNCTIONAL_ASSESSMENT: 0-10

## 2024-10-18 ASSESSMENT — PAIN SCALES - WONG BAKER
WONGBAKER_NUMERICALRESPONSE: HURTS WHOLE LOT
WONGBAKER_NUMERICALRESPONSE: HURTS EVEN MORE

## 2024-10-18 NOTE — CONSULTS
Neurological Surgery  Consult Note      Referral Diagnosis:   1. 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 Facial paresthesia        3. Gait instability        4. Nausea and vomiting, unspecified vomiting type        5. Fall, initial encounter        6. Cereb infrc due to unsp occls or stenos of right verteb art (Multi)  Vascular US carotid artery duplex bilateral    Vascular US carotid artery duplex bilateral      7. Other specified symptoms and signs involving the circulatory and respiratory systems  Vascular US carotid artery duplex bilateral      8. Cerebral ischemia  Transthoracic Echo (TTE) Complete      9. 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 female who presented to the emergency department on October 13, 2024 after a fall at home.  She reported new onset right facial numbness as well as balance/gait dysfunction.  As part of her workup, she underwent MRI/MRA of the brain and neck, as well as MRIs of the cervical and lumbar spine.  Her imaging showed a right vertebral artery dissection with an associated right cerebellar infarct.  She has been seen by neurology and vascular surgery.  She is being treated with dual antiplatelet therapy.  I have personally reviewed and interpreted  the MRIs of the cervical and lumbar spine.  These show it advanced multilevel degenerative changes.  There is moderate to severe central stenosis at C5-6.  There is also moderate stenosis at T11-12, T12 and L1 and L2-3.  There is a grade 1 anterolisthesis at L4-5.  At this time, she reports ongoing right facial numbness.  She states that her balance/gait have improved since her admission.  She denies any neck pain.  She also denies any radiating pain into her arms or legs.  She does report a history of left-sided low back pain, for which she is under the care of pain management.    Past Medical History  Past Medical History:   Diagnosis Date    Arthritis     Other specified disorders of adrenal gland 06/25/2018    Adrenal nodule    Personal history of other diseases of the respiratory system     History of bronchitis    Personal history of other diseases of the respiratory system     History of pharyngitis    Personal history of other infectious and parasitic diseases     History of viral infection    Personal history of other mental and behavioral disorders     History of depression    Personal history of other mental and behavioral disorders     History of depression    Personal history of other specified conditions     History of diarrhea    Personal history of other specified conditions     History of abdominal pain    Syncope and collapse     Vasovagal syncope    Unspecified osteoarthritis, unspecified site     Osteoarthritis       Surgical History  Past Surgical History:   Procedure Laterality Date    HYSTERECTOMY  11/28/2015    Hysterectomy    KNEE SURGERY  05/30/2018    Knee Surgery    OTHER SURGICAL HISTORY  11/28/2015    Tympanic Membrane Repair       Social History  Social History     Tobacco Use    Smoking status: Every Day     Current packs/day: 0.25     Types: Cigarettes    Smokeless tobacco: Never   Substance Use Topics    Alcohol use: Never    Drug use: Never       Allergies  Latex  Medications Prior  to Admission   Medication Sig Dispense Refill Last Dose/Taking    ARIPiprazole (Abilify) 2 mg tablet Take 1 tablet (2 mg) by mouth once daily.   10/14/2024    buPROPion XL (Wellbutrin XL) 300 mg 24 hr tablet Take 1 tablet (300 mg) by mouth once daily. Do not crush, chew, or split.   10/14/2024    calcium carbonate-vitamin D3 500 mg-5 mcg (200 unit) tablet Take 1 tablet by mouth 2 times a day.   10/14/2024    chlorthalidone (Hygroton) 25 mg tablet Take 1 tablet (25 mg) by mouth once daily. 100 tablet 2 10/14/2024    cholecalciferol (Vitamin D-3) 25 MCG (1000 UT) capsule Take 1 capsule (25 mcg) by mouth once daily.   10/14/2024    DULoxetine (Cymbalta) 30 mg DR capsule Take 1 capsule (30 mg) by mouth once daily. With 60mg capsule   10/14/2024    DULoxetine (Cymbalta) 60 mg DR capsule Take 1 capsule (60 mg) by mouth once daily. With 30mg capsule   10/14/2024    gabapentin (Neurontin) 300 mg capsule Take 1 capsule (300 mg) by mouth 3 times a day.   10/14/2024    magnesium oxide 500 mg magnesium tablet Take 1 tablet (500 mg) by mouth once daily at bedtime.   10/14/2024    pantoprazole (ProtoNix) 40 mg EC tablet TAKE 1 TABLET BY MOUTH DAILY 100 tablet 2 10/14/2024    predniSONE (Deltasone) 1 mg tablet Take 1 tablet (1 mg) by mouth once daily. With 5mg tablet   10/14/2024    predniSONE (Deltasone) 5 mg tablet Take 1 tablet (5 mg) by mouth once daily. With 1mg tablet   10/14/2024    traZODone (Desyrel) 50 mg tablet Take 1 tablet (50 mg) by mouth once daily at bedtime.   10/14/2024    sarilumab (Kevzara) 200 mg/1.14 mL injection Inject 1.14 mL (200 mg) under the skin every 14 (fourteen) days.   10/1/2024       Review of Systems  14/14 systems reviewed and negative other than what is listed in the history of present illness    Physical Exam  General: well developed, awake/alert/oriented x3, no distress, alert and cooperative  Head/Neck: neck Supple, no apparent injury  ENMT: mucous membranes moist, no apparent injury, no  "lesions seen  Cardiovascular: no pitting edema, no JVD  Respiratory/Thorax: Normal breath sounds with good chest expansion, thorax symmetric  Skin: warm and dry, no lesions, no rashes    Neurological/Musculoskeletal:    - Posture: normal coronal & sagittal alignment    - Range of motion: full active & passive range of motion    - Muscle Bulk: normal and symmetric in all extremities    - Paraspinal muscle spasm/tenderness absent; positive tenderness to palpation over the left sacroiliac joint    - Motor Strength: 4/5 strength in right dorsiflexion, otherwise 5/5 strength throughout bilateral upper and lower extremities    - Sensation: Right facial numbness    - Reflexes: 3+ patellar reflexes, negative Paz's sign      Last Recorded Vitals  Blood pressure 121/68, pulse 73, temperature 36.1 °C (97 °F), resp. rate 19, height 1.6 m (5' 3\"), weight 58.1 kg (128 lb), SpO2 94%.    Relevant Results  Scheduled medications  aspirin, 81 mg, oral, Daily  atorvastatin, 40 mg, oral, Nightly  buPROPion XL, 300 mg, oral, Daily  clopidogrel, 75 mg, oral, Daily  DULoxetine, 90 mg, oral, Daily  gabapentin, 300 mg, oral, TID  heparin (porcine), 5,000 Units, subcutaneous, q8h  pantoprazole, 40 mg, oral, Daily  perflutren lipid microspheres, 0.5-10 mL of dilution, intravenous, Once in imaging  perflutren protein A microsphere, 0.5 mL, intravenous, Once in imaging  predniSONE, 1 mg, oral, Daily  predniSONE, 5 mg, oral, Daily  sulfur hexafluoride microsphr, 2 mL, intravenous, Once in imaging  traZODone, 50 mg, oral, Nightly      Continuous medications     PRN medications  PRN medications: acetaminophen, [] hydrALAZINE **FOLLOWED BY** hydrALAZINE, oxygen, polyethylene glycol    Results for orders placed or performed during the hospital encounter of 10/15/24 (from the past 96 hours)   POCT GLUCOSE   Result Value Ref Range    POCT Glucose 116 (H) 74 - 99 mg/dL   CBC and Auto Differential   Result Value Ref Range    WBC 10.9 4.4 - " 11.3 x10*3/uL    nRBC 0.0 0.0 - 0.0 /100 WBCs    RBC 4.34 4.00 - 5.20 x10*6/uL    Hemoglobin 14.8 12.0 - 16.0 g/dL    Hematocrit 42.5 36.0 - 46.0 %    MCV 98 80 - 100 fL    MCH 34.1 (H) 26.0 - 34.0 pg    MCHC 34.8 32.0 - 36.0 g/dL    RDW 12.3 11.5 - 14.5 %    Platelets 272 150 - 450 x10*3/uL    Neutrophils % 75.1 40.0 - 80.0 %    Immature Granulocytes %, Automated 0.4 0.0 - 0.9 %    Lymphocytes % 14.8 13.0 - 44.0 %    Monocytes % 8.3 2.0 - 10.0 %    Eosinophils % 1.0 0.0 - 6.0 %    Basophils % 0.4 0.0 - 2.0 %    Neutrophils Absolute 8.16 (H) 1.60 - 5.50 x10*3/uL    Immature Granulocytes Absolute, Automated 0.04 0.00 - 0.50 x10*3/uL    Lymphocytes Absolute 1.61 0.80 - 3.00 x10*3/uL    Monocytes Absolute 0.90 (H) 0.05 - 0.80 x10*3/uL    Eosinophils Absolute 0.11 0.00 - 0.40 x10*3/uL    Basophils Absolute 0.04 0.00 - 0.10 x10*3/uL   Comprehensive metabolic panel   Result Value Ref Range    Glucose 123 (H) 74 - 99 mg/dL    Sodium 139 136 - 145 mmol/L    Potassium 3.1 (L) 3.5 - 5.3 mmol/L    Chloride 99 98 - 107 mmol/L    Bicarbonate 32 21 - 32 mmol/L    Anion Gap 11 10 - 20 mmol/L    Urea Nitrogen 29 (H) 6 - 23 mg/dL    Creatinine 1.24 (H) 0.50 - 1.05 mg/dL    eGFR 47 (L) >60 mL/min/1.73m*2    Calcium 10.5 (H) 8.6 - 10.3 mg/dL    Albumin 4.6 3.4 - 5.0 g/dL    Alkaline Phosphatase 55 33 - 136 U/L    Total Protein 6.9 6.4 - 8.2 g/dL    AST 14 9 - 39 U/L    Bilirubin, Total 0.6 0.0 - 1.2 mg/dL    ALT 12 7 - 45 U/L   Troponin I, High Sensitivity   Result Value Ref Range    Troponin I, High Sensitivity 12 0 - 13 ng/L   Protime-INR   Result Value Ref Range    Protime 10.5 9.8 - 12.8 seconds    INR 0.9 0.9 - 1.1   APTT   Result Value Ref Range    aPTT 29 27 - 38 seconds   Lipid Panel   Result Value Ref Range    Cholesterol 180 0 - 199 mg/dL    HDL-Cholesterol 61.5 mg/dL    Cholesterol/HDL Ratio 2.9     LDL Calculated 92 <=99 mg/dL    VLDL 26 0 - 40 mg/dL    Triglycerides 131 0 - 149 mg/dL    Non HDL Cholesterol 119 0 - 149  mg/dL   Hemoglobin A1C   Result Value Ref Range    Hemoglobin A1C 5.0 See comment %    Estimated Average Glucose 97 Not Established mg/dL   ECG 12 lead   Result Value Ref Range    Ventricular Rate 60 BPM    Atrial Rate 60 BPM    DC Interval 126 ms    QRS Duration 92 ms    QT Interval 494 ms    QTC Calculation(Bazett) 494 ms    P Axis 14 degrees    R Axis -29 degrees    T Axis 21 degrees    QRS Count 10 beats    Q Onset 224 ms    P Onset 161 ms    P Offset 211 ms    T Offset 471 ms    QTC Fredericia 494 ms   POCT GLUCOSE   Result Value Ref Range    POCT Glucose 96 74 - 99 mg/dL   CBC   Result Value Ref Range    WBC 8.5 4.4 - 11.3 x10*3/uL    nRBC 0.0 0.0 - 0.0 /100 WBCs    RBC 3.83 (L) 4.00 - 5.20 x10*6/uL    Hemoglobin 12.9 12.0 - 16.0 g/dL    Hematocrit 38.0 36.0 - 46.0 %    MCV 99 80 - 100 fL    MCH 33.7 26.0 - 34.0 pg    MCHC 33.9 32.0 - 36.0 g/dL    RDW 12.4 11.5 - 14.5 %    Platelets 243 150 - 450 x10*3/uL   Renal Function Panel   Result Value Ref Range    Glucose 91 74 - 99 mg/dL    Sodium 141 136 - 145 mmol/L    Potassium 3.1 (L) 3.5 - 5.3 mmol/L    Chloride 103 98 - 107 mmol/L    Bicarbonate 29 21 - 32 mmol/L    Anion Gap 12 10 - 20 mmol/L    Urea Nitrogen 24 (H) 6 - 23 mg/dL    Creatinine 1.07 (H) 0.50 - 1.05 mg/dL    eGFR 56 (L) >60 mL/min/1.73m*2    Calcium 9.3 8.6 - 10.3 mg/dL    Phosphorus 3.1 2.5 - 4.9 mg/dL    Albumin 3.8 3.4 - 5.0 g/dL   Magnesium   Result Value Ref Range    Magnesium 2.33 1.60 - 2.40 mg/dL   SST TOP   Result Value Ref Range    Extra Tube Hold for add-ons.    Transthoracic Echo (TTE) Complete   Result Value Ref Range    AV pk willie 1.67 m/s    AV mn grad 6.0 mmHg    LVOT diam 1.80 cm    MV E/A ratio 0.58     LA vol index A/L 23.9 ml/m2    Tricuspid annular plane systolic excursion 1.9 cm    LV EF 71 %    RV free wall pk S' 11.90 cm/s    LVIDd 2.60 cm    RVSP 27.4 mmHg    Aortic Valve Area by Continuity of VTI 2.45 cm2    Aortic Valve Area by Continuity of Peak Velocity 2.27 cm2    AV  pk grad 11.2 mmHg    LV A4C EF 57.0    CBC   Result Value Ref Range    WBC 9.2 4.4 - 11.3 x10*3/uL    nRBC 0.0 0.0 - 0.0 /100 WBCs    RBC 3.96 (L) 4.00 - 5.20 x10*6/uL    Hemoglobin 13.4 12.0 - 16.0 g/dL    Hematocrit 39.5 36.0 - 46.0 %     80 - 100 fL    MCH 33.8 26.0 - 34.0 pg    MCHC 33.9 32.0 - 36.0 g/dL    RDW 12.2 11.5 - 14.5 %    Platelets 245 150 - 450 x10*3/uL   Renal Function Panel   Result Value Ref Range    Glucose 94 74 - 99 mg/dL    Sodium 143 136 - 145 mmol/L    Potassium 3.4 (L) 3.5 - 5.3 mmol/L    Chloride 105 98 - 107 mmol/L    Bicarbonate 29 21 - 32 mmol/L    Anion Gap 12 10 - 20 mmol/L    Urea Nitrogen 24 (H) 6 - 23 mg/dL    Creatinine 1.23 (H) 0.50 - 1.05 mg/dL    eGFR 47 (L) >60 mL/min/1.73m*2    Calcium 9.6 8.6 - 10.3 mg/dL    Phosphorus 3.0 2.5 - 4.9 mg/dL    Albumin 3.9 3.4 - 5.0 g/dL   Magnesium   Result Value Ref Range    Magnesium 2.22 1.60 - 2.40 mg/dL   POCT GLUCOSE   Result Value Ref Range    POCT Glucose 191 (H) 74 - 99 mg/dL   POCT GLUCOSE   Result Value Ref Range    POCT Glucose 147 (H) 74 - 99 mg/dL   POCT GLUCOSE   Result Value Ref Range    POCT Glucose 104 (H) 74 - 99 mg/dL   POCT GLUCOSE   Result Value Ref Range    POCT Glucose 106 (H) 74 - 99 mg/dL   POCT GLUCOSE   Result Value Ref Range    POCT Glucose 103 (H) 74 - 99 mg/dL   POCT GLUCOSE   Result Value Ref Range    POCT Glucose 112 (H) 74 - 99 mg/dL         Intake/Output Summary (Last 24 hours) at 10/18/2024 1815  Last data filed at 10/18/2024 0600  Gross per 24 hour   Intake 240 ml   Output --   Net 240 ml       Imaging  See HPI    Assessment and Plan  Assessment/Plan     Assessment & Plan  Facial paresthesia    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  She was found to have a right  vertebral artery dissection and associated right cerebellar stroke, which she is currently being treated with dual antiplatelet therapy.  Her cervical and lumbar imaging shows advanced multilevel degenerative changes.  She has a disc herniation at C5-6 resulting in moderate to severe central stenosis.  She also has moderate stenosis at T11-12, T12-L1 and L2-3.  She is a grade 1 anterolisthesis at L4-5.  I had a lengthy discussion with the patient regarding her symptoms.  I do believe that her right facial numbness and new onset bowel/gait dysfunction are attributable to her stroke.  I agree with ongoing antiplatelet therapy.  I do not believe that the disc herniation at C5-6 is symptomatic.  Her low back pain is likely multifactorial, with components from her advanced degenerative disc disease, L4-5 spondylolisthesis, and possible sacroiliac joint dysfunction.  She has ongoing follow-up with pain management.  I am concerned that her lower extremity hyperreflexia may be due to thoracic myelopathy.  As such, I am going to order an MRI of the thoracic spine for further evaluation.  There is no indication for neurosurgical intervention on this admission at this time.  I would be happy to see the patient in clinic for ongoing treatment discussion.           Attestation  I have reviewed all prior documentation and reviewed the electronic medical record since admission. I have personally have reviewed all advanced imaging not just the reports and used my interpretation as documented as the relevant findings. I have reviewed the risks and benefits of all treatment recommendations listed in this note with the patient and family. I spent a total of 35 minutes in service to this patient's care during this date of service.    Darby Trejo MD PhD  Attending Neurosurgeon  [facility]   of Neurological Surgery  [facility] [employer]  Office:  [phone]  Fax: [phone]

## 2024-10-18 NOTE — PROGRESS NOTES
"                                                                 Hospital Medicine Progress Note       Patient Name: Jahaira Moore   YOB: 1955    Subjective:    Jahaira Moore is a 69 y.o.yo female who is hospital day 3     No significant changes. Awaiting precert for rehab     Objective:    Recent labs, imaging, vital signs and notes from other providers were reviewed     /78   Pulse 64   Temp 35.8 °C (96.4 °F)   Resp 19   Ht 1.6 m (5' 3\")   Wt 58.1 kg (128 lb)   SpO2 95%   BMI 22.67 kg/m²     Physical Exam:    GENERAL: well developed, non-toxic, NAD, alert & cooperative  HEENT: normocephalic, atraumatic, sclera clear  CARDIAC: regular rate & rhythm, S1/S2  PULMONARY: CTA b/l, no respiratory distress, - wheezes  ABDOMEN: soft, non-tender, non-distended  MSK: no peripheral edema, no obvious deformity  NEURO: A&O X 3, R facial numbness in V2-3 territory, otherwise intact  SKIN: Warm and dry, no lesions, no rashes.  PSYCH: appropriate mood & affect     Assessment/Plan:    CVA  Vertebral Artery Dissection  HTN  RA  Peripheral Neuropathy   CKD III      Cont DAPT, statin  BP is pretty well controlled  PT/OT    DVT Prophylaxis: heparin  Code Status: FULL CODE  Disposition: Acute Rehab     Carlos Zimmer, DO  Hospital Medicine    "

## 2024-10-18 NOTE — PREADMISSION SCREENING NOTE
Physical Medicine and Rehabilitation  Preadmission Screening    Rehab Physician's Review and Admission Determination:  Jahaira Moore is a 69 y.o. female who needs acute inpatient rehabilitation in order to achieve the functional goals outlined below. She requires close rehabilitation physician monitoring and management due to her complex medical conditions and co-morbidities with the primary indication of:  Rehab Admission Indication: Stroke: Stroke: 0001.1 Left Body Involvement (Right Brain). Comorbid conditions that will impact course of rehabilitation: PMHx of osteoporosis, DEPRESSION, HTN . She requires 24-hour rehabilitation nursing to manage bowel and bladder function, skin care, nutrition and fluid intake, pulmonary hygiene, pain control, safety, medication management, and patient/family goals. In addition, rehabilitation nursing will reiterate and reinforce therapy skills and equipment use, including ADLs, as well as provide education to the patient and family. Jahaira Moore is willing to participate in and is able to tolerate the proposed plan of care.    History of Present Illness: Jahaira Moore is a 69 y.o. female with a PMHx of osteoporosis who presented to [facility] on 10/16/2024 with a chief complaint of nausea, vomiting, right facial paresthesia and gait instability.  Her symptoms started the night before admission, and during the day she had right facial paresthesia.  She is having numbness as if she had Novocain injected to her right side of the face.  She was also having intermittent nausea and vomiting. She lost a lot of weight recently. She denies any headaches, change in vision, difficulty swallowing, change in hearing, fever/chills, chest pain, SOB, palpitation, cough, abdominal pain, change in bowel habits/urine, joint pain/swelling, weakness in any of the extremities or swelling, insomnia or any symptoms of depression.        MRI Brain on 10/15/24 :  1.  Discontinuous nodular foci of diffusion restriction  are present  within the a geographic territory in the inferior medial right  cerebellum, consistent with acute to early subacute infarcts. There  is slight local mass effect without evidence of hemorrhagic  transformation.  2. No supratentorial acute infarction is identified.  3. Scattered areas of hyperintense FLAIR and T2 signal are present in  the periventricular and subcortical white matter of bilateral  cerebral hemispheres are nonspecific, although favored to represent  chronic sequela of microvascular disease.          PER VASCULAR SURGERY, MOHAN HURTADO APRN-CNP   Patient does appear to have a vertebral artery occlusion/dissection with possible hematoma.  Patient did have recent tooth extraction 3 weeks ago right upper back molar.  Uncertain if this has any pertinence to current condition.  If symptoms worsen consider neurointerventional radiologist at [facility] for intervention.  This area is not intervened by vascular surgery.  Recommend conservative medical management: Start/continue daily antiplatelet therapy, maximize statin, and antihypertensive medication for  CVA, vertebral dissection/ stenosis prophylaxis. There was a shared discussion with the patient to continue a lifestyle modification that promotes: The adherence to strict BP and glycemic control, healthy dietary habit changes, incorporation of daily exercise regimen, adherence to all prescription/OTC medication schedules, attendance to all follow-up appointments, cessation from smoking , abstinence from alcohol and illicit drug use if applicable.           Past Medical History:              Adrenal nodule    Personal history of other diseases of the respiratory system       History of bronchitis    Personal history of other diseases of the respiratory system       History of pharyngitis    Personal history of other infectious and parasitic diseases       History of viral infection    Personal history of other mental and behavioral disorders        History of depression    Personal history of other mental and behavioral disorders       History of depression    Personal history of other specified conditions       History of diarrhea    Personal history of other specified conditions       History of abdominal pain    Syncope and collapse       Vasovagal syncope    Unspecified osteoarthritis, unspecified site       Osteoarthritis                 Past Surgical History:   Procedure Laterality Date    HYSTERECTOMY   11/28/2015     Hysterectomy    KNEE SURGERY   05/30/2018     Knee Surgery    OTHER SURGICAL HISTORY   11/28/2015     Tympanic Membrane Repair         Social History            Tobacco Use    Smoking status: Every Day       Current packs/day: 0.25       Types: Cigarettes    Smokeless tobacco: Never   Substance Use Topics    Alcohol use: Never    Drug use: Never        Component  Ref Range & Units 1 d ago  (10/17/24) 2 d ago  (10/16/24) 3 d ago  (10/15/24) 1 yr ago  (1/9/23) 1 yr ago  (10/20/22) 2 yr ago  (5/11/22) 2 yr ago  (10/27/21)   Glucose  74 - 99 mg/dL 94 91 123 High  72 Low  114 High  77 90   Sodium  136 - 145 mmol/L 143 141 139 139 141 141 146 High    Potassium  3.5 - 5.3 mmol/L 3.4 Low  3.1 Low  3.1 Low  3.7 4.7 4.0 4.9   Chloride  98 - 107 mmol/L 105 103 99 104 108 High  105 108 High    Bicarbonate  21 - 32 mmol/L 29 29 32 28 26 25 31   Anion Gap  10 - 20 mmol/L 12 12 11 11 12 15 12   Urea Nitrogen  6 - 23 mg/dL 24 High  24 High  29 High  36 High  30 High  42 High  32 High    Creatinine  0.50 - 1.05 mg/dL 1.23 High  1.07 High  1.24 High  1.40 High  1.39 High  1.67 High  1.14 High    eGFR  >60 mL/min/1.73m*2 47 Low  56 Low  CM 47 Low  CM       Comment: Calculations of estimated GFR are performed using the 2021 CKD-EPI Study Refit equation without the race variable for the IDMS-Traceable creatinine methods.  https://jasn.asnjournals.org/content/early/2021/09/22/ASN.2523625158   Calcium  8.6 - 10.3 mg/dL 9.6 9.3 10.5 High  9.4 R 9.0 9.7 9.9 R  "  Phosphorus  2.5 - 4.9 mg/dL 3.0 3.1 CM  3.1 CM      Comment: The performance characteristics of phosphorus testing in heparinized plasma have been validated by the individual  laboratory site where testing is performed. Testing on heparinized plasma is not approved by the FDA; however, such approval is not necessary.   Albumin  3.4 - 5.0 g/dL 3.9 3.8 4.6 4.1 4.3 4.4          DIET, ADULT REGULAR DIET, MAGIC CUP WITH DINNER     Allergies  Latex    Last Recorded Vitals 10/18/24  Blood pressure 140/65, pulse 94, temperature 36.1 °C (97 °F), temperature source Temporal, resp. rate 18, height 1.6 m (5' 3\"), weight 58.5 kg (128 lb 15.5 oz), SpO2 94%.      MEDICATIONS   ASA EC 81 MG  PO DAILY  LIPITOR 40 MG PO NIGHTLY  HEPARIN 5000 UNITS subcutaneous Q 8 HOURS  MIRALAX 17 GM PO DAILY PRN  WELLBUTRIN 300 MG PO DAILY  PLAVIX 75 MG PO DAILY  CYMBALTA DR 90 MG PO DAILY  NEURONTIN 300 MG PO 3 X A DAY  PROTONIX 40 MG PO DAILY  PREDNISONE PO  ORDERED  TYLENOL 650 MG PO Q 4 HOURS PRN    FULL CODE STATUS     ACTIVE PROBLEMS  CVA   Vertebral Artery Dissection  HTN  RA  Peripheral Neuropathy   CKD III     FALL PRECAUTIONS, CHAIR AND BED ALARM, DO NOT LEAVE ALONE IN BATHROOM, HIGH FALL RISK    PATIENT IS ALERT AND ORIENTED X3 . IS ABLE TO PARTICIPATE IN 3 HOURS OF THERAPY DAILY, PT,OT,ST.   DISCHARGE PLAN IS HOME IN 7-10 DAY AT A MODIFIED INDEPENDENT LEVEL WITH OUTPATIENT THERAPY AND FAMILY ASSIST (HAS 3 SISTERS). PATIENT AGREES WITH THIS DISCHARGE PLAN.    NURSING NEEDS  BOWEL AND BLADDER MANAGEMENT  STROKE EDUCATION  MEDICATION MANAGEMENT AND TEACHING  SKIN ASSESSMENT EVERY SHIFT  VITAL SIGNS AS ORDERED, HTN NOTED  FALL RISK, HAS HAD RECENT FALLS, GAIT INSTABILITY, OOB WITH ASSIST  ASPIRATION PRECAUTIONS  RIGHT FACIAL PARESTHESIA      Prior Functional Status:  Lives  alone. pt has 2+2 ELI with rail. pt is able to stay on the first floor if needed with a half bath. however pts bed/full bath are on the 2nd. pt has tub shower with " GBS and HHS. pt has raised toilet seat. pt drives.  family is nearby for support. pt uses cane in the community. pt amb without device in home.     Self-Care: INDEPENDENT  Ambulation: IN COMMUNITY USES CANE, IN HOME NO DEVICE  Stairs: INDEPENDENT  Cognition: ALERT AND ORIENTED X3  Wheelchair: NA  Devices:  STATED ABOVE    Current Functional Status:  Eating: SET UP  Oral hygiene: MIN A  Toileting: MOD A  Bathing: MOD A  Upper body dressing: MIN A  Lower body dressing: MOD A  Bed Mobility: SBA  Transfers: MIN A  Bladder and Bowel: CONTINENT  Cognition: ALERT AND ORIENTED X3    Rehabilitation Goals and Plan:  Expected level of improvement: MODIFIED INDEPENDENT  Likelihood of reaching these goals: excellent.  Therapy treatments needed to achieve these goals: physical therapy, occupational therapy, speech therapy, nursing, and aide.  Barriers to achieving these goals: Anxiety   Expected length of stay: 10  day(s)    When medically stable, anticipated discharge disposition: home with outpatient therapy  The potential to achieve that is excellent.

## 2024-10-18 NOTE — PROGRESS NOTES
Physical Therapy    Physical Therapy Treatment    Patient Name: Jahaira Moore  MRN: [id number]  Department: [facility] VASCULAR  Room: [facility]635-A  Today's Date: 10/18/2024  Time Calculation  Start Time: 1423  Stop Time: 1450  Time Calculation (min): 27 min         Assessment/Plan   PT Assessment  End of Session Communication: Bedside nurse  End of Session Patient Position: Up in room, Alarm off, not on at start of session (Seated EOB with call bell in reach.)     PT Plan  Treatment/Interventions: Bed mobility, Gait training, Transfer training, Balance training, Stair training, Therapeutic exercise, Therapeutic activity, Strengthening  PT Plan: Ongoing PT  PT Frequency: 5 times per week  PT Discharge Recommendations: High intensity level of continued care  PT Recommended Transfer Status: Assist x1  PT - OK to Discharge: Yes (once medically cleared)      General Visit Information:   PT  Visit  PT Received On: 10/18/24  General  Prior to Session Communication: Bedside nurse  Patient Position Received: Bed, 2 rail up, Alarm off, not on at start of session (Seated EOB)  General Comment:  (Pt pleasant and willing to participate in PT session.)    Subjective   Precautions:  Precautions  Medical Precautions: Fall precautions  Precautions Comment: aspiration, falls, OOB with assist    Vital Signs (Past 2hrs)                 Objective   Pain:  Pain Assessment  Pain Assessment: 0-10  0-10 (Numeric) Pain Score: 0 - No pain              Treatments:  Therapeutic Exercise  Therapeutic Exercise Performed: Yes (Pt performed seated EOB BLE ther ex consisting of heel/toe raises, marching, LAQ, HS, GS, hip ADD/ABD x10 reps each.)    Bed Mobility  Bed Mobility: Yes (PT performed sup>sit with SBA.)    Ambulation/Gait Training  Ambulation/Gait Training Performed: Yes (Pt able to amb 150' with FWW and CGA/MinAx1; demos slow, steady imani with reciprocal gait pattern.  No dizziness reported or LOB noted.)  Transfers  Transfer: Yes (Pt performed  sit<>stand with FWW and CGA/MinAx1; provided Min v/c for proper hand placement safety.  No dizziness reported or LOB noted.)    Outcome Measures:  [facility] Basic Mobility  Turning from your back to your side while in a flat bed without using bedrails: None  Moving from lying on your back to sitting on the side of a flat bed without using bedrails: A little  Moving to and from bed to chair (including a wheelchair): A little  Standing up from a chair using your arms (e.g. wheelchair or bedside chair): A little  To walk in hospital room: A little  Climbing 3-5 steps with railing: Total  Basic Mobility - Total Score: 17    Education Documentation  Mobility Training, taught by Carlos Aiken PTA at 10/18/2024  4:27 PM.  Learner: Patient  Readiness: Acceptance  Method: Explanation  Response: Verbalizes Understanding    Education Comments  No comments found.        OP EDUCATION:       Encounter Problems       Encounter Problems (Active)       PT Problem       STG -  Pt will navigate 12 stairs using rail with minAx1  (Progressing)       Start:  10/16/24    Expected End:  10/30/24            STG - Pt will transfer STS with SBA  (Met)       Start:  10/16/24    Expected End:  10/30/24    Resolved:  10/17/24         STG - Pt will transition supine <> sitting IND  (Progressing)       Start:  10/16/24    Expected End:  10/30/24            STG - Pt will amb 150' using w/w with SBA  (Progressing)       Start:  10/16/24    Expected End:  10/30/24

## 2024-10-18 NOTE — PROGRESS NOTES
Jahaira Moore is a 69 y.o. female on day 3 of admission presenting with Facial paresthesia.  Patient has a written discharge order.  Planned to go to [facility].  [employer] reviewed for updates.  Awaiting auth.  Care Transitions will continue to follow.    1:10 pm addendum  This TCC messaged Direct Precert Team for precert update.  Notified ins requesting P2P.  Info sent to attending provider via secure message.  Care Transitions will continue to follow.    1:25 pm addendum  Received secure message from provider, P2P completed and patient was approved.  Direct Pre-cert Team notified via secure message.  Await official confirmation from [facility].  Care Transitions will continue to follow.    2:00 pm addendum  Received message auth approved and good through 10/24. Auth # [id number].  Provider, facility and nursing updated.  Care Transitions will continue to follow.    2:30 pm addendum  This TCC met with patient at bedside, identified self and explained role.  Provided update, auth approved for patient to go to [facility].  Care Transitions will continue to follow.

## 2024-10-18 NOTE — CARE PLAN
Problem: Safety - Adult  Goal: Free from fall injury  Outcome: Progressing     The patient's goals for the shift include comfort/safety    The clinical goals for the shift include pt will remain hemodynamically stable throught shift

## 2024-10-19 ENCOUNTER — APPOINTMENT (OUTPATIENT)
Dept: RADIOLOGY | Facility: HOSPITAL | Age: 69
DRG: 057 | End: 2024-10-19
Payer: COMMERCIAL

## 2024-10-19 LAB
ANION GAP SERPL CALC-SCNC: 15 MMOL/L (ref 10–20)
BUN SERPL-MCNC: 29 MG/DL (ref 6–23)
CALCIUM SERPL-MCNC: 9.6 MG/DL (ref 8.6–10.3)
CHLORIDE SERPL-SCNC: 107 MMOL/L (ref 98–107)
CO2 SERPL-SCNC: 23 MMOL/L (ref 21–32)
CREAT SERPL-MCNC: 1.27 MG/DL (ref 0.5–1.05)
EGFRCR SERPLBLD CKD-EPI 2021: 45 ML/MIN/1.73M*2
ERYTHROCYTE [DISTWIDTH] IN BLOOD BY AUTOMATED COUNT: 12.6 % (ref 11.5–14.5)
GLUCOSE SERPL-MCNC: 87 MG/DL (ref 74–99)
HCT VFR BLD AUTO: 40.5 % (ref 36–46)
HGB BLD-MCNC: 13.2 G/DL (ref 12–16)
MCH RBC QN AUTO: 33.9 PG (ref 26–34)
MCHC RBC AUTO-ENTMCNC: 32.6 G/DL (ref 32–36)
MCV RBC AUTO: 104 FL (ref 80–100)
NRBC BLD-RTO: 0 /100 WBCS (ref 0–0)
PLATELET # BLD AUTO: 267 X10*3/UL (ref 150–450)
POTASSIUM SERPL-SCNC: 4.4 MMOL/L (ref 3.5–5.3)
RBC # BLD AUTO: 3.89 X10*6/UL (ref 4–5.2)
SODIUM SERPL-SCNC: 141 MMOL/L (ref 136–145)
WBC # BLD AUTO: 8.2 X10*3/UL (ref 4.4–11.3)

## 2024-10-19 PROCEDURE — 97530 THERAPEUTIC ACTIVITIES: CPT | Mod: GO

## 2024-10-19 PROCEDURE — 97530 THERAPEUTIC ACTIVITIES: CPT | Mod: GP

## 2024-10-19 PROCEDURE — 36415 COLL VENOUS BLD VENIPUNCTURE: CPT

## 2024-10-19 PROCEDURE — 97112 NEUROMUSCULAR REEDUCATION: CPT | Mod: GO

## 2024-10-19 PROCEDURE — 72146 MRI CHEST SPINE W/O DYE: CPT

## 2024-10-19 PROCEDURE — 97166 OT EVAL MOD COMPLEX 45 MIN: CPT | Mod: GO

## 2024-10-19 PROCEDURE — 85027 COMPLETE CBC AUTOMATED: CPT

## 2024-10-19 PROCEDURE — 1180000001 HC REHAB PRIVATE ROOM DAILY

## 2024-10-19 PROCEDURE — 72146 MRI CHEST SPINE W/O DYE: CPT | Performed by: RADIOLOGY

## 2024-10-19 PROCEDURE — 97162 PT EVAL MOD COMPLEX 30 MIN: CPT | Mod: GP

## 2024-10-19 PROCEDURE — 2500000001 HC RX 250 WO HCPCS SELF ADMINISTERED DRUGS (ALT 637 FOR MEDICARE OP)

## 2024-10-19 PROCEDURE — 80048 BASIC METABOLIC PNL TOTAL CA: CPT

## 2024-10-19 PROCEDURE — 97116 GAIT TRAINING THERAPY: CPT | Mod: GP

## 2024-10-19 PROCEDURE — 2500000004 HC RX 250 GENERAL PHARMACY W/ HCPCS (ALT 636 FOR OP/ED)

## 2024-10-19 ASSESSMENT — MONTREAL COGNITIVE ASSESSMENT (MOCA)
11. FOR EACH PAIR OF WORDS, WHAT CATEGORY DO THEY BELONG TO (OUT OF 2): 2
WHAT IS THE TOTAL SCORE (OUT OF 30): 29
7. [VIGILENCE] TAP WHEN HEARING DESIGNATED LETTER: 1
13. ORIENTATION SUBSCORE: 6
5. MEMORY TRIALS: 0
6. READ LIST OF DIGITS [FORWARD/BACKWARD]: 2
4. NAME EACH OF THE THREE ANIMALS SHOWN: 3
WHAT LEVEL OF EDUCATION WAS ATTAINED: 0
VISUOSPATIAL/EXECUTIVE SUBSCORE: 4
9. REPEAT EACH SENTENCE: 2
12. MEMORY INDEX SCORE: 5
8. SERIAL SUBTRACTION OF 7S: 3
10. [FLUENCY] NAME WORDS STARTING WITH DESIGNATED LETTER: 1

## 2024-10-19 ASSESSMENT — PAIN - FUNCTIONAL ASSESSMENT
PAIN_FUNCTIONAL_ASSESSMENT: 0-10

## 2024-10-19 ASSESSMENT — PAIN SCALES - GENERAL
PAINLEVEL_OUTOF10: 0 - NO PAIN
PAINLEVEL_OUTOF10: 4

## 2024-10-19 ASSESSMENT — ACTIVITIES OF DAILY LIVING (ADL): BATHING_ASSISTANCE: MINIMAL

## 2024-10-19 NOTE — PROGRESS NOTES
Occupational Therapy    Evaluation    Patient Name: Jahaira Moore  MRN: [id number]  Department: [facility] REHAB  Room: [address] [place] [address]  Today's Date: 10/19/2024  Time Calculation  Start Time: 0745  Stop Time: 0915  Time Calculation (min): 90 min   Patient seen for OT evaluation and neuro testing [id number]       Assessment:  OT Assessment: Impaired ADLs, transfers, and mobility  Prognosis: Good  Barriers to Discharge: None  End of Session Communication: Bedside nurse  End of Session Patient Position: Up in chair, Alarm on  OT Assessment Results: Decreased ADL status, Decreased endurance, Decreased IADLs, Decreased functional mobility, Decreased upper extremity strength, Decreased safe judgment during ADL, Decreased upper extremity range of motion, Decreased gross motor control, Decreased fine motor control, Decreased trunk control for functional activities  Prognosis: Good  Barriers to Discharge: None  Strengths: Physical health, Premorbid level of function, Support of Caregivers  Barriers to Participation: Housing layout    Plan:  Treatment Interventions: ADL retraining, Endurance training, Patient/family training, Neuromuscular reeducation, Functional transfer training  OT Frequency: 90 min/5 days per week (x 10 days)  OT Discharge Recommendations:  (anticipate need for initial supervision with ADLs, transfers, and mobility secondary to decreased safety awareness)  Equipment Recommended upon Discharge:  (3-in-1 commode, wheeled walker, tub seat, reacher)  Treatment Interventions: ADL retraining, Endurance training, Patient/family training, Neuromuscular reeducation, Functional transfer training    Subjective       General:  General  Reason for Referral: Patient admitted to [facility] 10/16/24 with complaints of nausea, vomiting, right facial parasthesia and gait instability. MRI brain: acute to early subacute infarcts inferior medial right cerebellum (OT eval and treat secondary to right cerebellar CVA)  Referred By: Sahil  Meri  Past Medical History Relevant to Rehab: osteoporosis, B TKR, HTN, depression  Prior to Session Communication: Bedside nurse  Patient Position Received: Alarm on, Up in chair  General Comment: patient pleasant and cooperative; motivated to participate in therapy    Precautions:  Medical Precautions: Fall precautions            Pain:  Pain Assessment  Pain Assessment: 0-10  0-10 (Numeric) Pain Score: 4  Pain Location:  (right wrist - recent ganglion cyst removal)  Pain Interventions: Distraction, Emotional support  Response to Interventions: pain decreased with change in position    Objective   Cognition:  Overall Cognitive Status: Impaired (MoCA administered. See below for details.)  Orientation Level: Oriented X4  Memory: Within Funtional Limits  Safety/Judgement: Exceptions to WFL  Impulsive: Mildly (decreased safety awareness)  Processing Speed: Within funtional limits           Home Living:  Type of Home: House (colonial style house; 2+2 entry steps with grab bar; 1/2 bath first floor; bedrooms, full bathroom second floor; laundry in basement)  Lives With: Alone (2 supportive sisters in the area; son lives in [place]; patient reports she could possibly stay with one of her sisters at discharge if needed)  Bathroom Shower/Tub: Tub/shower unit (wall grab bars)  Bathroom Toilet: Handicapped height  Bathroom Equipment: None    Prior Function:  Level of [place]: Independent with homemaking with ambulation, Independent with ADLs and functional transfers (no device used in house; st cane in community; manages all homemaking, driving, shopping)  Hand Dominance: Right    IADL History:  Occupation:  (retired occupational therapist)    ADL:  Eating Assistance: Independent  Grooming Assistance: Stand by  Bathing Assistance: Minimal  UE Dressing Assistance: Stand by  LE Dressing Assistance: Moderate  Toileting Assistance with Device: Moderate    Activity Tolerance:  Endurance: Endurance does not limit  participation in activity    Bed Mobility/Transfers: Bed Mobility  Bed Mobility:  (sba supine <> sit)    Transfers  Transfer:  (Sit to stand and pivot transfers without a device with min assist.  Impulsive.  Decreased safety awareness.)      Functional Mobility:  Functional Mobility  Functional Mobility Performed:  mod x1-2 handheld assist for mobility; noted trunk and RLE ataxia; poor foot placement, heel strike present    Sitting Balance:  Static Sitting Balance  Static Sitting-Level of Assistance: Close supervision  Dynamic Sitting Balance  Dynamic Sitting-Level of Assistance:  (sba)    Standing Balance:  Static Standing Balance  Static Standing-Level of Assistance: Moderate assistance        Vision:Vision - Basic Assessment  Current Vision: Wears glasses all the time   and Vision - Complex Assessment  Vision Comments: patient does not complain of any vision deficits since CVA; appears wfl    Sensation:  Light Touch: No apparent deficits  Proprioception: No apparent deficits    Strength:  Strength Comments:  strength:                  RUE: 25 lbs, 35 lbs (norm= 49 lbs);  LUE: 40 lbs, 45 lbs (norm= 41 lbs)    Perception:  Inattention/Neglect: Appears intact    Coordination:  Finger to Nose:  (mildly impaired; RUE slow)  Coordination Comment: 9-hole peg test:             RUE: 30 sec (norm= 23.5 sec);  LUE: 30 sec (norm= 27 sec)     Hand Function:  Gross Grasp: Functional    Extremities: RUE   RUE :  (AROM wfl; strength grossly 3+/5; did not test wrist strength secondary to pain from recent ganglion cyst removal - noted slight swelling, patient reports tenderness) and LUE   LUE: Within Functional Limits      Outcome Measures:MoCA  Visuospatial/Executive: 4  Naming: 3  Memory (Score '0' as this is an Unscored Section): 0  Attention: Read List of Digits: 2  Attention: Read List of Letters: 1  Attention: Serial Sevens: 3  Language: Repeat: 2  Language: Fluency: 1  Abstraction: 2  Delayed Recall: 5  Orientation:  6  Add 1 Point if </=12 yr Education: 0  MOCA Total Score: 29    Patient reports college graduate (bachelor of science), retired occupational therapist. Patient scoring 29/30 which does not indicate cognitive impairments.     Education Documentation  Handouts, taught by Lamar Cornell OT at 10/19/2024 11:06 AM.  Learner: Patient  Readiness: Acceptance  Method: Explanation  Response: Verbalizes Understanding, Needs Reinforcement    Home Exercise Program, taught by Lamar Cornell OT at 10/19/2024 11:06 AM.  Learner: Patient  Readiness: Acceptance  Method: Explanation  Response: Verbalizes Understanding, Needs Reinforcement    Body Mechanics, taught by Lamar Cornell OT at 10/19/2024 11:06 AM.  Learner: Patient  Readiness: Acceptance  Method: Explanation  Response: Verbalizes Understanding, Needs Reinforcement    ADL Training, taught by Lamar Cornell OT at 10/19/2024 11:06 AM.  Learner: Patient  Readiness: Acceptance  Method: Explanation  Response: Verbalizes Understanding, Needs Reinforcement     EDUCATION:  Education  Individual(s) Educated: Patient  Education Provided: POC discussed and agreed upon, Fall precautons, Diagnosis & Precautions  Plan of Care Discussed and Agreed Upon: yes  Patient Response to Education: Patient/Caregiver Verbalized Understanding of Information  Education Comment: Patient declines any information at this time on intimacy/sex in regards to diagnosis.    Goals:  Encounter Problems       Encounter Problems (Active)       OT Problem       LTG - Patient will complete grooming with set up. (Progressing)       Start:  10/19/24    Expected End:  10/29/24            LTG - Patient will complete toileting with supervision. (Progressing)       Start:  10/19/24    Expected End:  10/29/24            LTG - Patient will complete bathing with supervision. (Progressing)       Start:  10/19/24    Expected End:  10/29/24            LTG - Patient will complete UE dressing with set up.  (Progressing)       Start:  10/19/24    Expected End:  10/29/24            LTG - Patient will complete LE dressing using adaptive equip as needed with supervision. (Progressing)       Start:  10/19/24    Expected End:  10/29/24            LTG - Patient will complete commode transfer via device as needed with supervision. (Progressing)       Start:  10/19/24    Expected End:  10/29/24            LTG - Patient will complete tub/shower transfer using DME as needed with sba. (Progressing)       Start:  10/19/24    Expected End:  10/29/24            LTG - Patient will complete functional mobility via device as needed with s/sba. (Progressing)       Start:  10/19/24    Expected End:  10/29/24            LTG - Patient will complete light meal prep or kitchen access with sba. (Progressing)       Start:  10/19/24    Expected End:  10/29/24            STG - Patient will complete toileting with cga. (Progressing)       Start:  10/19/24    Expected End:  10/26/24            STG - Patient will complete bathing with sba. (Progressing)       Start:  10/19/24    Expected End:  10/26/24            STG - Patient will complete LE dressing using adaptive equip as needed with cga. (Progressing)       Start:  10/19/24    Expected End:  10/26/24            STG - Patient will complete commode transfer via device as needed with cga. (Progressing)       Start:  10/19/24    Expected End:  10/26/24            STG - Patient will complete tub/shower transfer using DME as needed with min assist. (Progressing)       Start:  10/19/24    Expected End:  10/26/24            STG - Patient will complete functional mobility via device as needed with min assist. (Progressing)       Start:  10/19/24    Expected End:  10/26/24

## 2024-10-19 NOTE — CARE PLAN
The patient's goals for the shift include      The clinical goals for the shift include Patient will maintain safety and use call light.

## 2024-10-19 NOTE — H&P
Admission diagnosis:    History Of Present Illness  Jahaira Moore is a 69 y.o. female presenting with a right hemiparesis.  She presented to [facility] on 10/16/2024 with right facial paralysis, gait instability, inability to walk.  Nausea and vomiting.  An MRI of her brain on 10/15/2024 showed scattered areas of hyperintense signal in the periventricular and subcortical right white matter of the bilateral cerebral hemispheres consistent with chronic microvascular disease..  She is currently requiring min assist to mod assist for transfers and functional mobility.   MRI Brain on 10/15/24 :  1.  Discontinuous nodular foci of diffusion restriction are present  within the a geographic territory in the inferior medial right  cerebellum, consistent with acute to early subacute infarcts. There  is slight local mass effect without evidence of hemorrhagic  transformation.  2. No supratentorial acute infarction is identified.  3. Scattered areas of hyperintense FLAIR and T2 signal are present in  the periventricular and subcortical white matter of bilateral  cerebral hemispheres are nonspecific, although favored to represent  chronic sequela of microvascular disease.  Prior to admission she was independent and used a cane in the community.  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    Surgical History  She has a past surgical history that includes Hysterectomy (2015); Other surgical history (2015); and Knee surgery (2018).  She has had bilateral knee replacements, and recent surgery on her right wrist to remove a ganglion     Social History  She reports that she has been smoking cigarettes. She has never used smokeless tobacco. She reports that she does not drink alcohol and does not use drugs.     Allergies  Latex    Family history: Her mother was institutionalized for Alzheimer's dementia her in her 60s, and her father  of cardiac disease    Medications  Current Facility-Administered Medications   Medication Dose Route Frequency Provider Last Rate Last Admin    acetaminophen (Tylenol) tablet 650 mg  650 mg oral q4h PRN Sebastian D Fuell, DO   650 mg at 10/18/24 1951    alum-mag hydroxide-simeth (Maalox MAX) 400-400-40 mg/5 mL suspension 30 mL  30 mL oral q6h PRN Sebastian D Fuell, DO        aspirin EC tablet 81 mg  81 mg oral Daily Sebastian D Fuell, DO   81 mg at 10/19/24 0828    atorvastatin (Lipitor) tablet 40 mg  40 mg oral Nightly Sebastian D Fuell, DO   40 mg at 10/18/24 2059    bisacodyl (Dulcolax) suppository 10 mg  10 mg rectal Daily Meri Olvera MD        buPROPion XL (Wellbutrin XL) 24 hr tablet 300 mg  300 mg oral Daily Sebastian D Fuell, DO   300 mg at 10/19/24 0828    cholecalciferol (Vitamin D-3) tablet 1,000 Units  1,000 Units oral Daily Sebastian D Fuell, DO   1,000 Units at 10/19/24 0829    clopidogrel (Plavix) tablet 75 mg  75 mg oral Daily Sebastian D Fuell, DO   75 mg at 10/19/24 0828    DULoxetine (Cymbalta) DR capsule 90 mg  90 mg oral Daily Sebastian D Fuell, DO   90 mg at 10/19/24 0829    enoxaparin (Lovenox) syringe 40 mg  40 mg subcutaneous q24h Sebastian D Fuell, DO   40 mg at 10/18/24 1951    gabapentin (Neurontin) capsule 300 mg  300 mg oral TID  Sebastian D Fuell, DO   300 mg at 10/19/24 0829    melatonin tablet 3 mg  3 mg oral Nightly PRN Sebastian D Fuell, DO        pantoprazole (ProtoNix) EC tablet 40 mg  40 mg oral Daily Sebastian D Fuell, DO   40 mg at 10/19/24 0829    predniSONE (Deltasone) tablet 1 mg  1 mg oral Daily Sebastian D Fuell, DO   1 mg at 10/19/24 0830    predniSONE (Deltasone) tablet 5 mg  5 mg oral Daily Sebastian D Fuell, DO   5 mg at 10/19/24 0829    sennosides (Senokot) tablet 17.2 mg  2 tablet oral Nightly Sebastian D Fuell, DO   17.2 mg at 10/18/24 2058    sennosides-docusate sodium (Aliyah-Colace) 8.6-50 mg per tablet 2 tablet  2 tablet oral Nightly PRN Meri Olvera MD   2 tablet at 10/18/24 2059    traZODone (Desyrel) tablet 50 mg  50 mg oral Nightly Sebastian D Fuell, DO   50 mg at 10/18/24 2059        Labs  Admission on 10/18/2024   Component Date Value Ref Range Status    WBC 10/19/2024 8.2  4.4 - 11.3 x10*3/uL Final    nRBC 10/19/2024 0.0  0.0 - 0.0 /100 WBCs Final    RBC 10/19/2024 3.89 (L)  4.00 - 5.20 x10*6/uL Final    Hemoglobin 10/19/2024 13.2  12.0 - 16.0 g/dL Final    Hematocrit 10/19/2024 40.5  36.0 - 46.0 % Final    MCV 10/19/2024 104 (H)  80 - 100 fL Final    MCH 10/19/2024 33.9  26.0 - 34.0 pg Final    MCHC 10/19/2024 32.6  32.0 - 36.0 g/dL Final    RDW 10/19/2024 12.6  11.5 - 14.5 % Final    Platelets 10/19/2024 267  150 - 450 x10*3/uL Final    Glucose 10/19/2024 87  74 - 99 mg/dL Final    Sodium 10/19/2024 141  136 - 145 mmol/L Final    Potassium 10/19/2024 4.4  3.5 - 5.3 mmol/L Final    Chloride 10/19/2024 107  98 - 107 mmol/L Final    Bicarbonate 10/19/2024 23  21 - 32 mmol/L Final    Anion Gap 10/19/2024 15  10 - 20 mmol/L Final    Urea Nitrogen 10/19/2024 29 (H)  6 - 23 mg/dL Final    Creatinine 10/19/2024 1.27 (H)  0.50 - 1.05 mg/dL Final    eGFR 10/19/2024 45 (L)  >60 mL/min/1.73m*2 Final    Calculations of estimated GFR are performed using the 2021 CKD-EPI Study Refit equation without the race variable for the  IDMS-Traceable creatinine methods.  https://jasn.asnjournals.org/content/early/2021/09/22/ASN.5864766477    Calcium 10/19/2024 9.6  8.6 - 10.3 mg/dL Final   Admission on 10/15/2024, Discharged on 10/18/2024   Component Date Value Ref Range Status    POCT Glucose 10/15/2024 116 (H)  74 - 99 mg/dL Final    WBC 10/15/2024 10.9  4.4 - 11.3 x10*3/uL Final    nRBC 10/15/2024 0.0  0.0 - 0.0 /100 WBCs Final    RBC 10/15/2024 4.34  4.00 - 5.20 x10*6/uL Final    Hemoglobin 10/15/2024 14.8  12.0 - 16.0 g/dL Final    Hematocrit 10/15/2024 42.5  36.0 - 46.0 % Final    MCV 10/15/2024 98  80 - 100 fL Final    MCH 10/15/2024 34.1 (H)  26.0 - 34.0 pg Final    MCHC 10/15/2024 34.8  32.0 - 36.0 g/dL Final    RDW 10/15/2024 12.3  11.5 - 14.5 % Final    Platelets 10/15/2024 272  150 - 450 x10*3/uL Final    Neutrophils % 10/15/2024 75.1  40.0 - 80.0 % Final    Immature Granulocytes %, Automated 10/15/2024 0.4  0.0 - 0.9 % Final    Immature Granulocyte Count (IG) includes promyelocytes, myelocytes and metamyelocytes but does not include bands. Percent differential counts (%) should be interpreted in the context of the absolute cell counts (cells/UL).    Lymphocytes % 10/15/2024 14.8  13.0 - 44.0 % Final    Monocytes % 10/15/2024 8.3  2.0 - 10.0 % Final    Eosinophils % 10/15/2024 1.0  0.0 - 6.0 % Final    Basophils % 10/15/2024 0.4  0.0 - 2.0 % Final    Neutrophils Absolute 10/15/2024 8.16 (H)  1.60 - 5.50 x10*3/uL Final    Percent differential counts (%) should be interpreted in the context of the absolute cell counts (cells/uL).    Immature Granulocytes Absolute, Au* 10/15/2024 0.04  0.00 - 0.50 x10*3/uL Final    Lymphocytes Absolute 10/15/2024 1.61  0.80 - 3.00 x10*3/uL Final    Monocytes Absolute 10/15/2024 0.90 (H)  0.05 - 0.80 x10*3/uL Final    Eosinophils Absolute 10/15/2024 0.11  0.00 - 0.40 x10*3/uL Final    Basophils Absolute 10/15/2024 0.04  0.00 - 0.10 x10*3/uL Final    Glucose 10/15/2024 123 (H)  74 - 99 mg/dL Final    Sodium  10/15/2024 139  136 - 145 mmol/L Final    Potassium 10/15/2024 3.1 (L)  3.5 - 5.3 mmol/L Final    Chloride 10/15/2024 99  98 - 107 mmol/L Final    Bicarbonate 10/15/2024 32  21 - 32 mmol/L Final    Anion Gap 10/15/2024 11  10 - 20 mmol/L Final    Urea Nitrogen 10/15/2024 29 (H)  6 - 23 mg/dL Final    Creatinine 10/15/2024 1.24 (H)  0.50 - 1.05 mg/dL Final    eGFR 10/15/2024 47 (L)  >60 mL/min/1.73m*2 Final    Calculations of estimated GFR are performed using the 2021 CKD-EPI Study Refit equation without the race variable for the IDMS-Traceable creatinine methods.  https://jasn.asnjournals.org/content/early/2021/09/22/ASN.5917792865    Calcium 10/15/2024 10.5 (H)  8.6 - 10.3 mg/dL Final    Albumin 10/15/2024 4.6  3.4 - 5.0 g/dL Final    Alkaline Phosphatase 10/15/2024 55  33 - 136 U/L Final    Total Protein 10/15/2024 6.9  6.4 - 8.2 g/dL Final    AST 10/15/2024 14  9 - 39 U/L Final    Bilirubin, Total 10/15/2024 0.6  0.0 - 1.2 mg/dL Final    ALT 10/15/2024 12  7 - 45 U/L Final    Patients treated with Sulfasalazine may generate falsely decreased results for ALT.    Troponin I, High Sensitivity 10/15/2024 12  0 - 13 ng/L Final    Protime 10/15/2024 10.5  9.8 - 12.8 seconds Final    INR 10/15/2024 0.9  0.9 - 1.1 Final    aPTT 10/15/2024 29  27 - 38 seconds Final    Ventricular Rate 10/15/2024 60  BPM Final    Atrial Rate 10/15/2024 60  BPM Final    AZ Interval 10/15/2024 126  ms Final    QRS Duration 10/15/2024 92  ms Final    QT Interval 10/15/2024 494  ms Final    QTC Calculation(Bazett) 10/15/2024 494  ms Final    P Axis 10/15/2024 14  degrees Final    R Axis 10/15/2024 -29  degrees Final    T Axis 10/15/2024 21  degrees Final    QRS Count 10/15/2024 10  beats Final    Q Onset 10/15/2024 224  ms Final    P Onset 10/15/2024 161  ms Final    P Offset 10/15/2024 211  ms Final    T Offset 10/15/2024 471  ms Final    QTC Fredericia 10/15/2024 494  ms Final    Cholesterol 10/15/2024 180  0 - 199 mg/dL Final          Age       Desirable   Borderline High   High     0-19 Y     0 - 169       170 - 199     >/= 200    20-24 Y     0 - 189       190 - 224     >/= 225         >24 Y     0 - 199       200 - 239     >/= 240   **All ranges are based on fasting samples. Specific   therapeutic targets will vary based on patient-specific   cardiac risk.    Pediatric guidelines reference:Pediatrics 2011, 128(S5).Adult guidelines reference: NCEP ATPIII Guidelines,DYLON 2001, 258:2396-97    Venipuncture immediately after or during the administration of Metamizole may lead to falsely low results. Testing should be performed immediately prior to Metamizole dosing.    HDL-Cholesterol 10/15/2024 61.5  mg/dL Final      Age       Very Low   Low     Normal    High    0-19 Y    < 35      < 40     40-45     ----  20-24 Y    ----     < 40      >45      ----        >24 Y      ----     < 40     40-60      >60      Cholesterol/HDL Ratio 10/15/2024 2.9   Final      Ref Values  Desirable  < 3.4  High Risk  > 5.0    LDL Calculated 10/15/2024 92  <=99 mg/dL Final                                Near   Borderline      AGE      Desirable  Optimal    High     High     Very High     0-19 Y     0 - 109     ---    110-129   >/= 130     ----    20-24 Y     0 - 119     ---    120-159   >/= 160     ----      >24 Y     0 -  99   100-129  130-159   160-189     >/=190      VLDL 10/15/2024 26  0 - 40 mg/dL Final    Triglycerides 10/15/2024 131  0 - 149 mg/dL Final       Age         Desirable   Borderline High   High     Very High   0 D-90 D    19 - 174         ----         ----        ----  91 D- 9 Y     0 -  74        75 -  99     >/= 100      ----    10-19 Y     0 -  89        90 - 129     >/= 130      ----    20-24 Y     0 - 114       115 - 149     >/= 150      ----         >24 Y     0 - 149       150 - 199    200- 499    >/= 500    Venipuncture immediately after or during the administration of Metamizole may lead to falsely low results. Testing should be performed immediately  prior to Metamizole dosing.    Non HDL Cholesterol 10/15/2024 119  0 - 149 mg/dL Final          Age       Desirable   Borderline High   High     Very High     0-19 Y     0 - 119       120 - 144     >/= 145    >/= 160    20-24 Y     0 - 149       150 - 189     >/= 190      ----         >24 Y    30 mg/dL above LDL Cholesterol goal      Hemoglobin A1C 10/15/2024 5.0  See comment % Final    Estimated Average Glucose 10/15/2024 97  Not Established mg/dL Final    AV pk willie 10/16/2024 1.67  m/s Final    AV mn grad 10/16/2024 6.0  mmHg Final    LVOT diam 10/16/2024 1.80  cm Final    MV E/A ratio 10/16/2024 0.58   Final    LA vol index A/L 10/16/2024 23.9  ml/m2 Final    Tricuspid annular plane systolic e* 10/16/2024 1.9  cm Final    LV EF 10/16/2024 71  % Final    RV free wall pk S' 10/16/2024 11.90  cm/s Final    LVIDd 10/16/2024 2.60  cm Final    RVSP 10/16/2024 27.4  mmHg Final    Aortic Valve Area by Continuity of* 10/16/2024 2.45  cm2 Final    Aortic Valve Area by Continuity of* 10/16/2024 2.27  cm2 Final    AV pk grad 10/16/2024 11.2  mmHg Final    LV A4C EF 10/16/2024 57.0   Final    POCT Glucose 10/15/2024 96  74 - 99 mg/dL Final    WBC 10/16/2024 8.5  4.4 - 11.3 x10*3/uL Final    nRBC 10/16/2024 0.0  0.0 - 0.0 /100 WBCs Final    RBC 10/16/2024 3.83 (L)  4.00 - 5.20 x10*6/uL Final    Hemoglobin 10/16/2024 12.9  12.0 - 16.0 g/dL Final    Hematocrit 10/16/2024 38.0  36.0 - 46.0 % Final    MCV 10/16/2024 99  80 - 100 fL Final    MCH 10/16/2024 33.7  26.0 - 34.0 pg Final    MCHC 10/16/2024 33.9  32.0 - 36.0 g/dL Final    RDW 10/16/2024 12.4  11.5 - 14.5 % Final    Platelets 10/16/2024 243  150 - 450 x10*3/uL Final    Glucose 10/16/2024 91  74 - 99 mg/dL Final    Sodium 10/16/2024 141  136 - 145 mmol/L Final    Potassium 10/16/2024 3.1 (L)  3.5 - 5.3 mmol/L Final    Chloride 10/16/2024 103  98 - 107 mmol/L Final    Bicarbonate 10/16/2024 29  21 - 32 mmol/L Final    Anion Gap 10/16/2024 12  10 - 20 mmol/L Final    Urea  Nitrogen 10/16/2024 24 (H)  6 - 23 mg/dL Final    Creatinine 10/16/2024 1.07 (H)  0.50 - 1.05 mg/dL Final    eGFR 10/16/2024 56 (L)  >60 mL/min/1.73m*2 Final    Calculations of estimated GFR are performed using the 2021 CKD-EPI Study Refit equation without the race variable for the IDMS-Traceable creatinine methods.  https://jasn.asnjournals.org/content/early/2021/09/22/ASN.2914815273    Calcium 10/16/2024 9.3  8.6 - 10.3 mg/dL Final    Phosphorus 10/16/2024 3.1  2.5 - 4.9 mg/dL Final    The performance characteristics of phosphorus testing in heparinized plasma have been validated by the individual  laboratory site where testing is performed. Testing on heparinized plasma is not approved by the FDA; however, such approval is not necessary.    Albumin 10/16/2024 3.8  3.4 - 5.0 g/dL Final    Magnesium 10/16/2024 2.33  1.60 - 2.40 mg/dL Final    Extra Tube 10/16/2024 Hold for add-ons.   Final    Auto resulted.    WBC 10/17/2024 9.2  4.4 - 11.3 x10*3/uL Final    nRBC 10/17/2024 0.0  0.0 - 0.0 /100 WBCs Final    RBC 10/17/2024 3.96 (L)  4.00 - 5.20 x10*6/uL Final    Hemoglobin 10/17/2024 13.4  12.0 - 16.0 g/dL Final    Hematocrit 10/17/2024 39.5  36.0 - 46.0 % Final    MCV 10/17/2024 100  80 - 100 fL Final    MCH 10/17/2024 33.8  26.0 - 34.0 pg Final    MCHC 10/17/2024 33.9  32.0 - 36.0 g/dL Final    RDW 10/17/2024 12.2  11.5 - 14.5 % Final    Platelets 10/17/2024 245  150 - 450 x10*3/uL Final    Glucose 10/17/2024 94  74 - 99 mg/dL Final    Sodium 10/17/2024 143  136 - 145 mmol/L Final    Potassium 10/17/2024 3.4 (L)  3.5 - 5.3 mmol/L Final    Chloride 10/17/2024 105  98 - 107 mmol/L Final    Bicarbonate 10/17/2024 29  21 - 32 mmol/L Final    Anion Gap 10/17/2024 12  10 - 20 mmol/L Final    Urea Nitrogen 10/17/2024 24 (H)  6 - 23 mg/dL Final    Creatinine 10/17/2024 1.23 (H)  0.50 - 1.05 mg/dL Final    eGFR 10/17/2024 47 (L)  >60 mL/min/1.73m*2 Final    Calculations of estimated GFR are performed using the 2021  "CKD-EPI Study Refit equation without the race variable for the IDMS-Traceable creatinine methods.  https://jasn.asnjournals.org/content/early/2021/09/22/ASN.2844891223    Calcium 10/17/2024 9.6  8.6 - 10.3 mg/dL Final    Phosphorus 10/17/2024 3.0  2.5 - 4.9 mg/dL Final    The performance characteristics of phosphorus testing in heparinized plasma have been validated by the individual  laboratory site where testing is performed. Testing on heparinized plasma is not approved by the FDA; however, such approval is not necessary.    Albumin 10/17/2024 3.9  3.4 - 5.0 g/dL Final    Magnesium 10/17/2024 2.22  1.60 - 2.40 mg/dL Final    POCT Glucose 10/17/2024 191 (H)  74 - 99 mg/dL Final    POCT Glucose 10/17/2024 147 (H)  74 - 99 mg/dL Final    POCT Glucose 10/17/2024 104 (H)  74 - 99 mg/dL Final    RN NOTIFIED    POCT Glucose 10/18/2024 106 (H)  74 - 99 mg/dL Final    POCT Glucose 10/18/2024 103 (H)  74 - 99 mg/dL Final    POCT Glucose 10/18/2024 112 (H)  74 - 99 mg/dL Final        Last Recorded Vitals  Blood pressure (!) 123/92, pulse 64, temperature 35.5 °C (95.9 °F), resp. rate 16, height 1.6 m (5' 3\"), weight 61.2 kg (135 lb), SpO2 91%.      Review of Systems   Constitutional:  Negative for chills, fatigue and fever.   HENT:  Negative for congestion, ear discharge, ear pain   Eyes:  Negative for pain and discharge.   Respiratory:  Negative for chest tightness, shortness of breath and wheezing.    Cardiovascular:  Negative for chest pain, palpitations and leg swelling.   Gastrointestinal:  Negative for abdominal distention, blood in stool, she is having some constipation but had a small bowel movement this morning and is taking stool softeners  Genitourinary:  Negative for difficulty urinating, dysuria, frequency and hematuria.   Musculoskeletal:  Negative for back pain, myalgias and neck stiffness.   Skin:  Negative for color change, rash and wound.   Neurological she does note she has right sided weakness, gait " instability, and impaired coordination  Hematological:  Negative for adenopathy. Does not bruise/bleed easily.   She has a history of depression but is not depressed at the moment      Physical Exam  Constitutional:       General: she is not in acute distress.     Appearance: Normal appearance.   HENT:      Head: Normocephalic.      Nose: Nose normal.     Eyes:      Extraocular Movements: Extraocular movements intact.      Conjunctiva/sclera: Conjunctivae normal.   Cardiovascular:      Rate and Rhythm: Normal rate and regular rhythm.      Heart sounds: No murmur heard.     No gallop.   Pulmonary:      Breath sounds: Normal breath sounds. No wheezing, rhonchi or rales.   Abdominal:      General: Abdomen is flat. Bowel sounds are slightly diminished.  There is no distension.      Palpations: Abdomen is soft.      Tenderness: There is no abdominal tenderness. There is no guarding.   Musculoskeletal:         General: No swelling, tenderness or deformity.      Cervical back: No rigidity or tenderness.   Skin:     General: Skin is warm and dry.      Findings: No rash.   Neurological:      General: No focal deficit present.      Mental Status: she is alert and oriented to person, place, and time.      Cranial Nerves: No cranial nerve deficit.   Right upper extremity strength is 4 out of 5, she does have impaired coordination,  Right lower extremity strength is 5- out of 5  She has ataxia  Psychiatric:         Mood and Affect: Mood normal.      Assessment/Plan   Assessment & Plan  Cerebrovascular accident (CVA) due to occlusion of right vertebral artery (Multi)      1.  CVA  Aspirin 81 mg daily  Lipitor 40 mg daily  Plavix 75 mg daily  Begin physical therapy for  transfers, bed mobility, lower extremity strengthening and coordination, gait training with an assistive device  Begin occupational therapy for ADL retraining and ADL activities, transfers, exercises to improve her coordination    2.  Hyperlipidemia  Lipitor 40 mg  daily    3.  Rheumatoid arthritis   prednisone 6 mg daily    4.  History of depression  Wellbutrin 300 mg monitor for adverse mood changes  Cymbalta 90 mg daily  Trazodone 50 mg at at bedtime  Neurontin 300 mg 3 times a day    5.  DVT prophylaxis  Lovenox 40 mg every 24 hours    6.  GI protectant  Protonix 40 mg daily    7.  Elevated BUN to creatinine  Replete blood work, encourage fluids    8. FENGI  She is on DVT prophylaxis with Lovenox  She is on GI protectant with Protonix  She is constipated and is a bowel program has been started  She is a fall risk fall precautions are being utilized       Physician Physical Assessment/ Post admission Evaluation (MATY)     I have reviewed the preadmission rehabilitation screening. There have been no relevant changes since the preadmission screening;    Rehab Plan of Care   The Interdisciplinary Team will work collaboratively to address the patient problems and goals to be managed by the Individualized Interdisciplinary Plan of Care. See the IPOC note for a complete review of the plan of care.    Medical Necessity:  Jahaira Moore requires, and is capable of participating in, an intensive and coordinated interdisciplinary acute inpatient rehabilitation program. She requires close rehabilitation physician monitoring and management to monitor her complex medical conditions and coordinate rehabilitation care. The patient's rehabilitation goals and medical complexity cannot adequately be managed in a less intensive setting. Potential risks for clinical complications include: Falls, worsening kidney function.    Medical Prognosis:  Excellent for continued progress and participation with therapy.    Time spent reviewing records, examining patient, and documentation is 60 minutes      Elisa Cantor DO

## 2024-10-19 NOTE — PROGRESS NOTES
Physical Therapy    Physical Therapy Evaluation    Patient Name: Jahaira Moore  MRN: [id number]  Department: [facility] REHAB  Room: [address] [place] [address]  Today's Date: 10/19/2024   Time Calculation  Start Time: 0830  Stop Time: 0915  Time Calculation (min): 45 min    Assessment/Plan   PT Assessment  PT Assessment Results: Decreased strength, Decreased endurance, Impaired balance, Decreased mobility, Decreased coordination, Impaired judgement, Decreased safety awareness  Rehab Prognosis:  (Good for stated goals.)  Barriers to Discharge: Pt lives alone  Evaluation/Treatment Tolerance: Patient tolerated treatment well  Strengths: Ability to acquire knowledge, Attitude of self, Premorbid level of function, Support of extended family/friends  End of Session Communication: Bedside nurse  End of Session Patient Position: Up in chair, Alarm on  IP OR SWING BED PT PLAN  Inpatient or Swing Bed: Inpatient  PT Plan  Treatment/Interventions: Bed mobility, Transfer training, Gait training, Stair training, Balance training, Strengthening, Endurance training, Therapeutic exercise, Therapeutic activity, Neuromuscular re-education  PT Plan: Ongoing PT  PT Frequency: 5 times per week (6x prn)  PT Discharge Recommendations:  (Anticipate discharge home with the need for intermittent supervision and assist with higher level mobility tasks and due to impulsivity and decreased safety awareness.)  Equipment Recommended upon Discharge: Wheeled walker  PT Recommended Transfer Status: Assist x1    Subjective   General Visit Information:  General  Reason for Referral: PT eval and treat secondary to acute right cerebellar CVA (Patient admitted to [facility] 10/16/24 with complaints of nausea, vomiting, right facial parasthesia and gait instability. MRI brain: acute to early subacute infarcts inferior medial right cerebellum)  Referred By: Meri Baxter  Past Medical History Relevant to Rehab: osteoporosis, B TKR, HTN, depression  Prior to Session Communication:  Bedside nurse  Patient Position Received: Alarm on, Up in chair  General Comment: patient pleasant and cooperative; motivated to participate in therapy  Home Living:  Home Living  Type of Home: House (Pt lives in colonial style home.  Bedroom and full bath upstairs.  1/2 bath on first floor.  Pt reports feeling comfortable to sleep on first floor as needed.)  Lives With: Alone (2 supportive sisters in the area; son lives in [place]; patient reports she could possibly stay with one of her sisters at discharge if needed)  Home Adaptive Equipment: Cane  Prior Level of Function:  Prior Function Per Pt/Caregiver Report  Level of [place]:  (Independent community ambulator.  Cane used outside the home.  Pt does drive.)  Precautions:  Precautions  Medical Precautions: Fall precautions       Objective   Pain:  Pain Assessment  Pain Assessment: 0-10  0-10 (Numeric) Pain Score: 4  Pain Location: Wrist  Pain Orientation: Right  Pain Interventions: Distraction, Emotional support  Response to Interventions: pain decreased with change in position  Cognition:  Cognition  Overall Cognitive Status: Impaired (See OT MoCA testing for details)  Orientation Level: Oriented X4  Memory: Within Funtional Limits  Safety/Judgement: Exceptions to WFL  Impulsive: Mildly (Decreased safety awareness)    General Assessments:  Activity Tolerance  Endurance: Endurance does not limit participation in activity    Sensation  Light Touch: No apparent deficits  Proprioception:  (Proprioception intact bilateral hallux; however, pt does ambulate as though there are proprioceptive deficits - heavy heelstrike contact with floor and steppage type gait not related to foot drop.)       Coordination  Coordination Comment:  (Accurate but slowed alternating movements at bilateral ankles.  Accurate finger to nose bilaterally.  Accurate finger opposition bilaterally.  Accurate rapid alternating movements at wrists.)    Static Sitting Balance  Static  Sitting-Level of Assistance: Independent  Dynamic Sitting Balance  Dynamic Sitting-Level of Assistance: Contact guard (Trunk instability)    Static Standing Balance  Static Standing-Level of Assistance: Minimum assistance  Dynamic Standing Balance  Dynamic Standing-Level of Assistance: Moderate assistance  Functional Assessments:  Bed Mobility  Bed Mobility:  (Rolling independent.  Sit <> supine with SBA)    Transfers  Transfer:  (Sit to stand and pivot transfers without a device with min assist.  Impulsive.  Decreased safety awareness.)    Ambulation/Gait Training  Ambulation/Gait Training Performed:  (Pt amb 20 ft without a device with min/mod assist x2.  Gait form:  RLE and trunk ataxia noted, forceful bilateral heelstrike (R>L), bilateral knee instability, uncoordinated gait, increased hip flexion.)    Stairs  Stairs:  (Unsafe to attempt)     Object From Floor  Comments:  (Unsafe to attempt)  Extremity/Trunk Assessments:  RUE   RUE :  (RUE ROM WFL.  RUE strength grossly 3+/5.)  LUE   LUE:  (LUE ROM and strength WFL.)  RLE   RLE :  (RLE ROM WFL.  RLE strength grossly 4-/5.)  LLE   LLE :  (LLE ROM and strength WFL.)      Encounter Problems       Encounter Problems (Active)       PT Problem       STG - Bed mobility with supervision (Progressing)       Start:  10/19/24    Expected End:  10/24/24            STG - Transfers with CGA (Progressing)       Start:  10/19/24    Expected End:  10/24/24            STG - Pt will amb 100 ft with wheeled walker and min/mod assist x1. (Progressing)       Start:  10/19/24    Expected End:  10/24/24            STG - Pt will negotiate 2 stairs with rails and min assist x2. (Progressing)       Start:  10/19/24    Expected End:  10/24/24            LTG - Mod independent bed mobility. (Progressing)       Start:  10/19/24    Expected End:  10/29/24            LTG - Mod independent transfers (Progressing)       Start:  10/19/24    Expected End:  10/29/24            LTG - Pt will  amb 150 ft with wheeled walker and SBA (Progressing)       Start:  10/19/24    Expected End:  10/29/24            LTG - Pt will negotiate 5 stairs with rails and min assist x1. (Progressing)       Start:  10/19/24    Expected End:  10/29/24               Pain - Adult              Education Documentation  Mobility Training, taught by Samra Pickens, PT at 10/19/2024  1:05 PM.  Learner: Patient  Readiness: Acceptance  Method: Explanation, Demonstration  Response: Verbalizes Understanding, Needs Reinforcement

## 2024-10-19 NOTE — CARE PLAN
The patient's goals for the shift include  orientation to the unit and maintain safety.     The clinical goals for the shift include Patient will maintain safety and use call light.

## 2024-10-19 NOTE — CARE PLAN
The patient's goals for the shift include  participate in therapy     The clinical goals for the shift include To have a BM    Problem: Pain - Adult  Goal: Verbalizes/displays adequate comfort level or baseline comfort level  10/19/2024 1850 by Katherin Bella RN  Outcome: Progressing  10/19/2024 1849 by Katherin Bella RN  Outcome: Progressing     Problem: Discharge Planning  Goal: Discharge to home or other facility with appropriate resources  10/19/2024 1850 by Katherin Bella RN  Outcome: Progressing  10/19/2024 1849 by Katherin Bella RN  Outcome: Progressing     Problem: Chronic Conditions and Co-morbidities  Goal: Patient's chronic conditions and co-morbidity symptoms are monitored and maintained or improved  10/19/2024 1850 by Katherin Bella RN  Outcome: Progressing  10/19/2024 1849 by Katherin Bella RN  Outcome: Progressing     Problem: Fall/Injury  Goal: Verbalize understanding of risk factor reduction measures to prevent injury from fall in the home  10/19/2024 1850 by Katherin Bella RN  Outcome: Progressing  10/19/2024 1849 by Katherin Bella RN  Outcome: Progressing  Goal: Use assistive devices by end of the shift  10/19/2024 1850 by Katherin Bella RN  Outcome: Progressing  10/19/2024 1849 by Katherin Bella RN  Outcome: Progressing  Goal: Pace activities to prevent fatigue by end of the shift  10/19/2024 1850 by Katherin Bella RN  Outcome: Progressing  10/19/2024 1849 by Katherin Bella RN  Outcome: Progressing     Problem: Pain  Goal: Takes deep breaths with improved pain control throughout the shift  10/19/2024 1850 by Katherin Bella RN  Outcome: Progressing  10/19/2024 1849 by Katherin Bella RN  Outcome: Progressing  Goal: Turns in bed with improved pain control throughout the shift  10/19/2024 1850 by Katherin Bella RN  Outcome: Progressing  10/19/2024 1849 by Katherin Bella RN  Outcome: Progressing  Goal: Walks with improved pain control throughout the  shift  10/19/2024 1850 by Katherin Bella RN  Outcome: Progressing  10/19/2024 1849 by Katherin Bella RN  Outcome: Progressing  Goal: Performs ADL's with improved pain control throughout shift  10/19/2024 1850 by Katherin Bella RN  Outcome: Progressing  10/19/2024 1849 by Katherin Bella RN  Outcome: Progressing  Goal: Participates in PT with improved pain control throughout the shift  10/19/2024 1850 by Katherin Bella RN  Outcome: Progressing  10/19/2024 1849 by Katherin Bella RN  Outcome: Progressing     Problem: Skin  Goal: Decreased wound size/increased tissue granulation at next dressing change  Outcome: Progressing  Flowsheets (Taken 10/19/2024 1850)  Decreased wound size/increased tissue granulation at next dressing change:   Promote sleep for wound healing   Protective dressings over bony prominences  Goal: Participates in plan/prevention/treatment measures  Outcome: Progressing  Flowsheets (Taken 10/19/2024 1850)  Participates in plan/prevention/treatment measures:   Increase activity/out of bed for meals   Elevate heels  Goal: Prevent/manage excess moisture  Outcome: Progressing  Flowsheets (Taken 10/19/2024 1850)  Prevent/manage excess moisture:   Moisturize dry skin   Cleanse incontinence/protect with barrier cream   Follow provider orders for dressing changes   Monitor for/manage infection if present  Goal: Prevent/minimize sheer/friction injuries  Outcome: Progressing  Flowsheets (Taken 10/19/2024 1850)  Prevent/minimize sheer/friction injuries:   Use pull sheet   Turn/reposition every 2 hours/use positioning/transfer devices   Increase activity/out of bed for meals   HOB 30 degrees or less  Goal: Promote/optimize nutrition  Outcome: Progressing  Flowsheets (Taken 10/19/2024 1850)  Promote/optimize nutrition:   Offer water/supplements/favorite foods   Consume > 50% meals/supplements   Monitor/record intake including meals  Goal: Promote skin healing  Outcome: Progressing  Flowsheets  (Taken 10/19/2024 1850)  Promote skin healing: Turn/reposition every 2 hours/use positioning/transfer devices

## 2024-10-19 NOTE — PROGRESS NOTES
Physical Therapy    Physical Therapy Treatment    Patient Name: Jahaira Moore  MRN: [id number]  Department: [facility] REHAB  Room: [address] [place] [address]  Today's Date: 10/19/2024  Time Calculation  Start Time: 1045  Stop Time: 1130  Time Calculation (min): 45 min         Assessment/Plan   PT Assessment  PT Assessment Results: Decreased strength, Decreased endurance, Impaired balance, Decreased mobility, Decreased coordination, Impaired judgement, Decreased safety awareness  Rehab Prognosis:  (Good for stated goals.)  Barriers to Discharge: Pt lives alone  Evaluation/Treatment Tolerance: Patient tolerated treatment well  Strengths: Ability to acquire knowledge, Attitude of self, Premorbid level of function, Support of extended family/friends  End of Session Communication: Bedside nurse  End of Session Patient Position: Up in chair, Alarm on     PT Plan  Treatment/Interventions: Bed mobility, Transfer training, Gait training, Stair training, Balance training, Strengthening, Endurance training, Therapeutic exercise, Therapeutic activity, Neuromuscular re-education  PT Plan: Ongoing PT  PT Frequency: 5 times per week (6x prn)  PT Discharge Recommendations:  (Anticipate discharge home with the need for intermittent supervision and assist with higher level mobility tasks and due to impulsivity and decreased safety awareness.)  Equipment Recommended upon Discharge: Wheeled walker  PT Recommended Transfer Status: Assist x1      General Visit Information:   PT  Visit  PT Received On: 10/19/24  General  Reason for Referral: PT eval and treat secondary to acute right cerebellar CVA (Patient admitted to [facility] 10/16/24 with complaints of nausea, vomiting, right facial parasthesia and gait instability. MRI brain: acute to early subacute infarcts inferior medial right cerebellum)  Referred By: Meri Baxter  Past Medical History Relevant to Rehab: osteoporosis, B TKR, HTN, depression  Prior to Session Communication: Bedside nurse  Patient  Position Received: Alarm on, Up in chair  General Comment: patient pleasant and cooperative; motivated to participate in therapy    Subjective   Precautions:  Precautions  Medical Precautions: Fall precautions      Objective   Pain:  Pain Assessment  Pain Assessment: 0-10  0-10 (Numeric) Pain Score: 4  Pain Location: Wrist  Pain Orientation: Right  Pain Interventions: Repositioned  Response to Interventions: pain decreased with change in position      Ambulation/Gait Training  Ambulation/Gait Training Performed:  (Pt amb 50 ft x2 without a device with min/mod arm assist x2.  Highly unsteady and uncoordinated gait, ataxic.  Pt also amb 25 ft x2 over carpet with min/mod arm assist x2.  Amb 50 ft with wheeled walker and mod assist x1.   Trunk ataxia mildly improved with use of wheeled walker but RLE ataxia continues at same degree.)    Transfers  Transfer:  (Sit to stand and pivot transfer training without a device with min assist.)    EDUCATION:  Education Comment:  (Pt educated on condition, recovery process, rehab process, rehab unit, safe mobility techniques and goals.)    Encounter Problems       Encounter Problems (Active)       PT Problem       STG - Bed mobility with supervision (Progressing)       Start:  10/19/24    Expected End:  10/24/24            STG - Transfers with CGA (Progressing)       Start:  10/19/24    Expected End:  10/24/24            STG - Pt will amb 100 ft with wheeled walker and min/mod assist x1. (Progressing)       Start:  10/19/24    Expected End:  10/24/24            STG - Pt will negotiate 2 stairs with rails and min assist x2. (Progressing)       Start:  10/19/24    Expected End:  10/24/24            LTG - Mod independent bed mobility. (Progressing)       Start:  10/19/24    Expected End:  10/29/24            LTG - Mod independent transfers (Progressing)       Start:  10/19/24    Expected End:  10/29/24            LTG - Pt will amb 150 ft with wheeled walker and SBA (Progressing)        Start:  10/19/24    Expected End:  10/29/24            LTG - Pt will negotiate 5 stairs with rails and min assist x1. (Progressing)       Start:  10/19/24    Expected End:  10/29/24               Pain - Adult

## 2024-10-19 NOTE — INDIVIDUALIZED OVERALL PLAN OF CARE NOTE
Individualized Plan of Care:     Primary rehabilitation diagnosis: Cerebrovascular accident     [facility] code: 1.1     Estimated length of stay:   10 days to 2 weeks     Medical functional prognosis is good to be discharged home at an overall intermittent assist level       Patient/family anticipated outcome:  Home as independent as possible     Functional outcome/goal:  Bed mobility: Modified independent  Transfer sit to stand : Modified independent  Ambulation: Modified independent  Upper extremity dressing: Modified independent  Lower extremity dressing: Modified independent  Stairs: Standby assist  communicate needs and wants as independent as able.       Anticipated interventions:  Therapeutic exercises  Gait Training  Transfer training  Exercises to improve balance and mobility  ADL retraining for grooming, bathing, ADL activities  Exercises to improve strength and endurance            Required therapy:  Physical therapy 90 minutes 5 days/week.  Occupational therapy 90 minutes 5 days/week.       Patient has good potential to be discharged home at an overall modified independent level.  She is highly motivated participate in therapy to return home with support of her sisters . She will be seen by a physician to manage her medical comorbidities    Individualized physician plan of care    1.  CVA  Aspirin 81 mg daily  Lipitor 40 mg daily  Plavix 75 mg daily  Begin physical therapy for  transfers, bed mobility, lower extremity strengthening and coordination, gait training with an assistive device  Begin occupational therapy for ADL retraining and ADL activities, transfers, exercises to improve her coordination     2.  Hyperlipidemia  Lipitor 40 mg daily     3.  Rheumatoid arthritis   prednisone 6 mg daily     4.  History of depression  Wellbutrin 300 mg monitor for adverse mood changes  Cymbalta 90 mg daily  Trazodone 50 mg at at bedtime  Neurontin 300 mg 3 times a day     5.  DVT prophylaxis  Lovenox 40 mg every 24  hours     6.  GI protectant  Protonix 40 mg daily     7.  Elevated BUN to creatinine  Replete blood work, encourage fluids     8. LILLIAM  She is on DVT prophylaxis with Lovenox  She is on GI protectant with Protonix  She is constipated and is a bowel program has been started  She is a fall risk fall precautions are being utilized

## 2024-10-20 PROCEDURE — 92610 EVALUATE SWALLOWING FUNCTION: CPT | Mod: GN | Performed by: IN HOME SUPPORTIVE CARE

## 2024-10-20 PROCEDURE — 1180000001 HC REHAB PRIVATE ROOM DAILY

## 2024-10-20 PROCEDURE — 2500000001 HC RX 250 WO HCPCS SELF ADMINISTERED DRUGS (ALT 637 FOR MEDICARE OP)

## 2024-10-20 PROCEDURE — 92523 SPEECH SOUND LANG COMPREHEN: CPT | Mod: GN | Performed by: IN HOME SUPPORTIVE CARE

## 2024-10-20 PROCEDURE — 2500000004 HC RX 250 GENERAL PHARMACY W/ HCPCS (ALT 636 FOR OP/ED)

## 2024-10-20 ASSESSMENT — BRIEF INTERVIEW FOR MENTAL STATUS (BIMS)
ASKED TO RECALL SOCK: YES, NO CUE REQUIRED
WHAT DAY OF THE WEEK IS IT: CORRECT
ASKED TO RECALL BLUE: YES, NO CUE REQUIRED
ASKED TO RECALL BED: YES, NO CUE REQUIRED
WHAT YEAR IS IT: CORRECT
INITIAL REPETITION OF BED BLUE SOCK - FIRST ATTEMPT: 3
WHAT MONTH IS IT: ACCURATE WITHIN 5 DAYS
BIMS SUMMARY SCORE: 15

## 2024-10-20 ASSESSMENT — PAIN - FUNCTIONAL ASSESSMENT
PAIN_FUNCTIONAL_ASSESSMENT: 0-10

## 2024-10-20 ASSESSMENT — PAIN SCALES - GENERAL
PAINLEVEL_OUTOF10: 0 - NO PAIN

## 2024-10-20 NOTE — PROGRESS NOTES
Speech-Language Pathology    SLP Adult IRF Speech-Language Cognition    Patient Name: Jahaira Moore  MRN: [id number]  Today's Date: 10/20/2024   Time Calculation  Start Time: 0830  Stop Time: 0910  Time Calculation (min): 40 min    SLP Assessment:  SLP Assessment  Prognosis: Excellent  Treatment Tolerance: Patient tolerated treatment well  Medical Staff Made Aware: Yes  Strengths: Cognition, Motivation  Barriers: None  BOSTON NAMING administered this date patient without evidence of aphasia.    Summary of Scores:  Number of spontaneously given correct responses: 60  First Item Failed: 0  Total number correct: 60    Informal testing including conversational speech and generative naming task also found to be WFL.  1 instance of a phonemic paraphasia immediately self corrected.  No evidence of dysarthria.    Checkbook management task provided for reading and writing assessment; used calculator for calculations consistent with baseline.  No errors made.    SLP Plan:  Plan  SLP TX Plan: Discharge from Speech Therapy  SLP Plan: No skilled SLP  SLP - OK to Discharge: Yes    Subjective   Current Problem:  R/o aphasia    Most Recent Visit:  SLP Most Recent Visit  SLP Received On: 10/20/24    General Visit Information:  General Information  Referred By: Sakshi  Past Medical History Relevant to Rehab: osteoporosis, B TKR, HTN, depression  Copied from EMR:  Jahaira oMore is a 69 y.o. female presenting with a right hemiparesis.  She presented to [facility] on 10/16/2024 with right facial paralysis, gait instability, inability to walk.  Nausea and vomiting.  An MRI of her brain on 10/15/2024 showed scattered areas of hyperintense signal in the periventricular and subcortical right white matter of the bilateral cerebral hemispheres consistent with chronic microvascular disease..  She is currently requiring min assist to mod assist for transfers and functional mobility.   MRI Brain on 10/15/24 :  1.  Discontinuous nodular foci of diffusion  restriction are present  within the a geographic territory in the inferior medial right  cerebellum, consistent with acute to early subacute infarcts. There  is slight local mass effect without evidence of hemorrhagic  transformation.  2. No supratentorial acute infarction is identified.  3. Scattered areas of hyperintense FLAIR and T2 signal are present in  the periventricular and subcortical white matter of bilateral  cerebral hemispheres are nonspecific, although favored to represent  chronic sequela of microvascular disease.  Prior to admission she was independent and used a cane in the community.  She is currently requiring mod assist for toileting bathing and lower extremity dressing, bed mobility is standby assist, and transfers are min assist  She does have a past medical history of depression     Objective     Pain:  Pain Assessment  Pain Assessment: 0-10  0-10 (Numeric) Pain Score: 0 - No pain    Cognition:  Cognition  Overall Cognitive Status: Within Functional Limits  Orientation Level: Oriented X4  Effectively used strategies for various tasks.  Good insight into impulsivity and uses self talk to slow herself down.        Inpatient:  Education Documentation  Rationale for evaluation, results and recommendations; voiced understanding and agreement.

## 2024-10-20 NOTE — CARE PLAN
The patient's goals for the shift include  comfort and safety    The clinical goals for the shift include To have a BM

## 2024-10-20 NOTE — CARE PLAN
The patient's goals for the shift include  to participate in walking program    The clinical goals for the shift include to have a good BM    Problem: Pain - Adult  Goal: Verbalizes/displays adequate comfort level or baseline comfort level  Outcome: Progressing     Problem: Discharge Planning  Goal: Discharge to home or other facility with appropriate resources  Outcome: Progressing     Problem: Chronic Conditions and Co-morbidities  Goal: Patient's chronic conditions and co-morbidity symptoms are monitored and maintained or improved  Outcome: Progressing     Problem: Fall/Injury  Goal: Verbalize understanding of risk factor reduction measures to prevent injury from fall in the home  Outcome: Progressing  Goal: Use assistive devices by end of the shift  Outcome: Progressing  Goal: Pace activities to prevent fatigue by end of the shift  Outcome: Progressing     Problem: Pain  Goal: Takes deep breaths with improved pain control throughout the shift  Outcome: Progressing  Goal: Turns in bed with improved pain control throughout the shift  Outcome: Progressing  Goal: Walks with improved pain control throughout the shift  Outcome: Progressing  Goal: Performs ADL's with improved pain control throughout shift  Outcome: Progressing  Goal: Participates in PT with improved pain control throughout the shift  Outcome: Progressing     Problem: Skin  Goal: Decreased wound size/increased tissue granulation at next dressing change  Outcome: Progressing  Flowsheets (Taken 10/20/2024 1842)  Decreased wound size/increased tissue granulation at next dressing change:   Promote sleep for wound healing   Protective dressings over bony prominences  Goal: Participates in plan/prevention/treatment measures  Outcome: Progressing  Flowsheets (Taken 10/20/2024 1842)  Participates in plan/prevention/treatment measures:   Elevate heels   Increase activity/out of bed for meals  Goal: Prevent/manage excess moisture  Outcome: Progressing  Flowsheets  (Taken 10/20/2024 1842)  Prevent/manage excess moisture:   Moisturize dry skin   Follow provider orders for dressing changes   Cleanse incontinence/protect with barrier cream   Monitor for/manage infection if present  Goal: Prevent/minimize sheer/friction injuries  Outcome: Progressing  Flowsheets (Taken 10/20/2024 1842)  Prevent/minimize sheer/friction injuries:   Use pull sheet   Increase activity/out of bed for meals   HOB 30 degrees or less   Turn/reposition every 2 hours/use positioning/transfer devices  Goal: Promote/optimize nutrition  Outcome: Progressing  Flowsheets (Taken 10/20/2024 1842)  Promote/optimize nutrition:   Monitor/record intake including meals   Consume > 50% meals/supplements   Offer water/supplements/favorite foods  Goal: Promote skin healing  Outcome: Progressing  Flowsheets (Taken 10/20/2024 1842)  Promote skin healing: Turn/reposition every 2 hours/use positioning/transfer devices

## 2024-10-20 NOTE — PROGRESS NOTES
Speech-Language Pathology    SLP Adult IRF Speech-Language Pathology Clinical Swallow Evaluation    Patient Name: Jahaira Moore  MRN: [id number]  Today's Date: 10/20/2024   Time Calculation  Start Time: 0720  Stop Time: 0750  Time Calculation (min): 30 min     Recommendations:  Solid Diet Recommendations : Regular (IDDSI Level 7)  Liquid Diet Recommendations: Thin (IDDSI Level 0)  Compensatory Swallowing Strategies: Small bites/sips, Check for pocketing of food, Upright 90 degrees as possible for all oral intake  Medication Administration Recommendations: Whole, With Liquid    Assessment:  Prognosis: Excellent  Patient presents with a functional swallow at  this time.   Despite facial numbness, no loss of bolus or excessive pocketing.  Patient does endorse biting inside of her lip on the R a few days ago but is now eating more cautiously.  No concern for pharyngeal stage dysphagia at this time.    Plan:  SLP Plan: No skilled SLP      Subjective   Current Problem:  R/o dysphagia      General Visit Information:  Referred By: Sakshi  Copied from EMR:  Jahaira Moore is a 69 y.o. female presenting with a right hemiparesis.  She presented to [facility] on 10/16/2024 with right facial paralysis, gait instability, inability to walk.  Nausea and vomiting.  An MRI of her brain on 10/15/2024 showed scattered areas of hyperintense signal in the periventricular and subcortical right white matter of the bilateral cerebral hemispheres consistent with chronic microvascular disease..  She is currently requiring min assist to mod assist for transfers and functional mobility.   MRI Brain on 10/15/24 :  1.  Discontinuous nodular foci of diffusion restriction are present  within the a geographic territory in the inferior medial right  cerebellum, consistent with acute to early subacute infarcts. There  is slight local mass effect without evidence of hemorrhagic  transformation.  2. No supratentorial acute infarction is identified.  3. Scattered areas  of hyperintense FLAIR and T2 signal are present in  the periventricular and subcortical white matter of bilateral  cerebral hemispheres are nonspecific, although favored to represent  chronic sequela of microvascular disease.  Prior to admission she was independent and used a cane in the community.  She is currently requiring mod assist for toileting bathing and lower extremity dressing, bed mobility is standby assist, and transfers are min assist  She does have a past medical history of depression    Objective   Pain:  Pain Assessment  Pain Assessment: 0-10  0-10 (Numeric) Pain Score: 0 - No pain    Oral/Motor Assessment:  Oral Motor: Within Functional Limits  Facial Sensation: Reduced    Consistencies Trialed:  Consistencies Trialed: Yes  Consistencies Trialed: Thin (IDDSI Level 0) - Cup, Soft & bite sized/chopped (IDDSI Level 6), Regular (IDDSI Level 7)    Clinical Observations:  Patient Positioning: Upright in Bed  Able to self feed without difficulty.    Inpatient:  Education Documentation  Discussed rationale for evaluation, recommendations and POC; voiced understanding and agreement.

## 2024-10-21 PROCEDURE — 97530 THERAPEUTIC ACTIVITIES: CPT | Mod: GO

## 2024-10-21 PROCEDURE — 97112 NEUROMUSCULAR REEDUCATION: CPT | Mod: GP

## 2024-10-21 PROCEDURE — 97112 NEUROMUSCULAR REEDUCATION: CPT | Mod: GP,CQ

## 2024-10-21 PROCEDURE — 99232 SBSQ HOSP IP/OBS MODERATE 35: CPT

## 2024-10-21 PROCEDURE — 2500000001 HC RX 250 WO HCPCS SELF ADMINISTERED DRUGS (ALT 637 FOR MEDICARE OP)

## 2024-10-21 PROCEDURE — 97112 NEUROMUSCULAR REEDUCATION: CPT | Mod: GO

## 2024-10-21 PROCEDURE — 1180000001 HC REHAB PRIVATE ROOM DAILY

## 2024-10-21 PROCEDURE — 97116 GAIT TRAINING THERAPY: CPT | Mod: GP

## 2024-10-21 PROCEDURE — 97535 SELF CARE MNGMENT TRAINING: CPT | Mod: GO

## 2024-10-21 PROCEDURE — 2500000004 HC RX 250 GENERAL PHARMACY W/ HCPCS (ALT 636 FOR OP/ED)

## 2024-10-21 PROCEDURE — 97530 THERAPEUTIC ACTIVITIES: CPT | Mod: GP,CQ

## 2024-10-21 PROCEDURE — 97116 GAIT TRAINING THERAPY: CPT | Mod: GP,CQ

## 2024-10-21 RX ORDER — DICLOFENAC SODIUM 10 MG/G
4 GEL TOPICAL 4 TIMES DAILY PRN
Status: DISCONTINUED | OUTPATIENT
Start: 2024-10-21 | End: 2024-10-30 | Stop reason: HOSPADM

## 2024-10-21 ASSESSMENT — ACTIVITIES OF DAILY LIVING (ADL): HOME_MANAGEMENT_TIME_ENTRY: 10

## 2024-10-21 ASSESSMENT — PAIN - FUNCTIONAL ASSESSMENT
PAIN_FUNCTIONAL_ASSESSMENT: 0-10
PAIN_FUNCTIONAL_ASSESSMENT: UNABLE TO SELF-REPORT

## 2024-10-21 ASSESSMENT — PAIN SCALES - GENERAL
PAINLEVEL_OUTOF10: 0 - NO PAIN
PAINLEVEL_OUTOF10: 8
PAINLEVEL_OUTOF10: 0 - NO PAIN
PAINLEVEL_OUTOF10: 5 - MODERATE PAIN

## 2024-10-21 NOTE — CARE PLAN
The patient's goals for the shift include  improvement in strength.     The clinical goals for the shift include Patient to be free from injury or falls.

## 2024-10-21 NOTE — PROGRESS NOTES
10/21/24:  Spoke with patient bedside, introduced self and explained role.  Patient [place] in a [place] with first floor setup available.  Prior to admission the patient was independent and driving.  She has 2 sisters that can provide assist at discharge.  She also has a son that lives nearby.  Discussed ELOS 10 days, depending on progress and goals.  Discussed therapy recommendation to have someone be with her initially at discharge.  Talked about options including staying with her sister at DC (she has first floor setup), or one of her sisters staying with her.  Discussed having sister come in for therapy teaching once they discuss discharge options and plan.  Will continue to update regarding DC planning.  -Johny Booth RN      10/24/24:  Spoke with patient bedside and discussed DC [facility] 10/27 vs. W[facility] 10/30.  Waiting on insurance update also.  -Johny Booth RN    10/25/24:  Patient agreeable to discharge Weds 10/30. -Johny Booth RN    11/1/24:  Follow up phone call completed, no questions at this time.  Patient waiting for scheduling call from [facility], but per note referral was received and processed.  -Johny Booth RN

## 2024-10-21 NOTE — CONSULTS
"Nutrition Initial Assessment:   Nutrition Assessment    Reason for Assessment: Admission nursing screening    Patient is a 69 y.o. female presenting with CVA      Nutrition History:  Food and Nutrient History: Pt feels she is eating very well and is feeling good at this time.  She is on a regular diet.  She is not interested in a supplement at this time and feels she does not need one.  Food Allergies/Intolerances:  None  GI Symptoms: None  Oral Problems: None       Anthropometrics:  Height: 160 cm (5' 3\")   Weight: 61.2 kg (135 lb)   BMI (Calculated): 23.92  IBW/kg (Dietitian Calculated): 52 kg  Percent of IBW: 117 %       Weight History:   Wt Readings from Last 10 Encounters:   10/18/24 61.2 kg (135 lb)   10/15/24 58.1 kg (128 lb)   09/05/24 64 kg (141 lb)   07/26/24 65.8 kg (145 lb)   05/22/24 76.7 kg (169 lb)   12/22/23 76.7 kg (169 lb)   09/01/23 73.9 kg (163 lb)   05/10/23 73.9 kg (163 lb)   02/16/23 73.9 kg (163 lb)   02/07/23 77 kg (169 lb 12.1 oz)         Weight Change %:  Weight History / % Weight Change: Pt reprots 20# wt loss x 1 year. Based on available wt records, pt with 18.6 kg (24%) wt loss x 5 months, 7.7 kg (11.7%) wt loss x 3 months.  Significant Weight Loss: Yes  Interpretation of Weight Loss: >10% in 6 months    Nutrition Focused Physical Exam Findings:    Subcutaneous Fat Loss:   Orbital Fat Pads: Well nourished (slightly bulging fat pads)  Buccal Fat Pads: Well nourished (full, rounded cheeks)  Triceps: Well nourished (ample fat tissue)  Muscle Wasting:  Temporalis: Mild-Moderate (slight depression)  Pectoralis (Clavicular Region): Mild-Moderate (some protrusion of clavicle)  Deltoid/Trapezius: Mild-Moderate (slight protrusion of acromion process)  Interosseous: Well nourished (muscle bulges)  Edema:  Edema Location: no edema  Physical Findings:       Nutrition Significant Labs:  BMP Trend:   Results from last 7 days   Lab Units 10/19/24  0718 10/17/24  0630 10/16/24  0642 10/15/24  1500 "   GLUCOSE mg/dL 87 94 91 123*   CALCIUM mg/dL 9.6 9.6 9.3 10.5*   SODIUM mmol/L 141 143 141 139   POTASSIUM mmol/L 4.4 3.4* 3.1* 3.1*   CO2 mmol/L 23 29 29 32   CHLORIDE mmol/L 107 105 103 99   BUN mg/dL 29* 24* 24* 29*   CREATININE mg/dL 1.27* 1.23* 1.07* 1.24*        Nutrition Specific Medications:  Lipitor; vit D-3; plavix; lovenox; protonix; maalox; kerry colace    I/O:   Last BM Date: 10/19/24;      Dietary Orders (From admission, onward)       Start     Ordered    10/19/24 1718  May Participate in Room Service  ( ROOM SERVICE MAY PARTICIPATE)  Once        Question:  .  Answer:  Yes    10/19/24 1717    10/18/24 1907  Adult diet Regular  Diet effective now        Question:  Diet type  Answer:  Regular    10/18/24 1907                     Estimated Needs:      Method for Estimating Needs: 0277-6693  26-30 nancy kg of IBW     Method for Estimating Needs: 52-62  1-1.2 gm kg of IBW     Method for Estimating Needs: 8145-8530  20-30 ml kg of IBW as medically indicated.        Nutrition Diagnosis   Malnutrition Diagnosis  Patient has Malnutrition Diagnosis: Yes  Diagnosis Status: New  Malnutrition Diagnosis: Moderate malnutrition related to chronic disease or condition  As Evidenced by: <75% energy intake compared to estimated needs for > 1 month.  >10% wt loss in 6 months  Additional Assessment Information: Pt was having issues eating and throwing up, but she is doing very well now and is eating her meals well per pt and nursing            Nutrition Interventions/Recommendations         Nutrition Prescription:  Individualized Nutrition Prescription Provided for : Continue regular diet to encourage intake, Sending magic cup at lunch to help meet nutritional needs        Nutrition Interventions:   Interventions: Meals and snacks, Medical food supplement  Goal: >75% of meals  Medical Food Supplement: Commercial food  Goal: 100% of magic cup at lunch    Collaboration and Referral of Nutrition Care: Collaboration by  nutrition professional with other providers    Nutrition Education:      Not  at this time    Nutrition Monitoring and Evaluation   Food/Nutrient Related History Monitoring  Monitoring and Evaluation Plan: Energy intake, Amount of food, Fluid intake  Criteria: >75% of energy needs  Criteria: adequate fluid intake without fluid overload  Criteria: >75% of meals    Body Composition/Growth/Weight History  Monitoring and Evaluation Plan: Weight    Biochemical Data, Medical Tests and Procedures  Monitoring and Evaluation Plan: Electrolyte/renal panel, Glucose/endocrine profile  Criteria: improved BMP  Criteria: glucose within desired range              Time Spent (min): 45 minutes

## 2024-10-21 NOTE — PROGRESS NOTES
Physical Therapy    Physical Therapy Treatment    Patient Name: Jahaira Moore  MRN: [id number]  Department: [facility] REHAB  Room: [address] [place] [address]  Today's Date: 10/21/2024  Time Calculation  Start Time: 1345  Stop Time: 1430  Time Calculation (min): 45 min         Assessment/Plan   PT Assessment  End of Session Patient Position:  (in bathroom with call light in reach.  Pt instructed to call for help when finished, pt verbalized understanding.)     PT Plan  Treatment/Interventions: Bed mobility, Transfer training, Gait training, Stair training, Balance training, Strengthening, Endurance training, Therapeutic exercise, Therapeutic activity, Neuromuscular re-education  PT Plan: Ongoing PT  PT Frequency: 5 times per week (6x prn)  PT Discharge Recommendations:  (Anticipate discharge home with the need for intermittent supervision and assist with higher level mobility tasks and due to impulsivity and decreased safety awareness.)  Equipment Recommended upon Discharge: Wheeled walker  PT Recommended Transfer Status: Assist x1      General Visit Information:   PT  Visit  PT Received On: 10/21/24  General  Prior to Session Communication: Bedside nurse  Patient Position Received: Alarm on, Up in chair  General Comment: patient pleasant and cooperative; motivated to participate in therapy    Subjective   Precautions:  Precautions  Medical Precautions: Fall precautions    Vital Signs (Past 2hrs)                 Objective   Pain:  Pain Assessment  Pain Assessment: 0-10  0-10 (Numeric) Pain Score: 0 - No pain    Treatments:  Balance/Neuromuscular Re-Education  Balance/Neuromuscular Re-Education Activity Performed:  (Pt side steps R/L x 15 ft each direction with min A x 2 HHA with 2# ankle weight RLE.  Pt ambulates 15 ft forward/backward with min A x 2 HHA with 2# ankle weight RLE.)    Ambulation/Gait Training  Ambulation/Gait Training Performed:  (Pt ambulates 100 ft with FWW and min/mod A x 1. Pt unsteady, RLE ataxia noted, increased R lateral  "leans during L turns. Pt ambualtes 100 ft with FWW and 2# ankle weight RLE with min A x 1. Pt ambulates 100 ft with 2# ankle weight RLE with min a x 2 HHA.)  Transfers  Transfer:  (Pt performs sit/stand transfers with CGA/min A.  Pt requires increased assist up to min with chair approach for safety.  Pt reaching for chair before lining up to return to sit.)    Stairs  Stairs:  (Pt performs step ups with LLE leading ascending with BHR to 6\" step x 10 reps .  Pt performs step ups with RLE leading ascending with BHR to 6\" step x 10 reps.  All with 2# ankle weight RLE and min A x 2.)      Education Documentation  Pt educated on techniques for transfers and gait training with device in order to maximize safety and independence.  Pt educated on balance interventions, including balance systems, and how balance relates to safety, mobility, and function.    Encounter Problems       Encounter Problems (Active)       PT Problem       STG - Bed mobility with supervision (Progressing)       Start:  10/19/24    Expected End:  10/24/24            STG - Transfers with CGA (Progressing)       Start:  10/19/24    Expected End:  10/24/24            STG - Pt will amb 100 ft with wheeled walker and min/mod assist x1. (Progressing)       Start:  10/19/24    Expected End:  10/24/24            STG - Pt will negotiate 2 stairs with rails and min assist x2. (Progressing)       Start:  10/19/24    Expected End:  10/24/24            LTG - Mod independent bed mobility. (Progressing)       Start:  10/19/24    Expected End:  10/29/24            LTG - Mod independent transfers (Progressing)       Start:  10/19/24    Expected End:  10/29/24            LTG - Pt will amb 150 ft with wheeled walker and SBA (Progressing)       Start:  10/19/24    Expected End:  10/29/24            LTG - Pt will negotiate 5 stairs with rails and min assist x1. (Progressing)       Start:  10/19/24    Expected End:  10/29/24                            "

## 2024-10-21 NOTE — PROGRESS NOTES
Physical Therapy    Physical Therapy Treatment    Patient Name: Jahaira Moore  MRN: [id number]  Department: [facility] REHAB  Room: [address] [place], [place] [address]  Today's Date: 10/21/2024  Time Calculation  Start Time: 0915  Stop Time: 1000  Time Calculation (min): 45 min         Assessment/Plan         PT Plan  Treatment/Interventions: Bed mobility, Transfer training, Gait training, Stair training, Balance training, Strengthening, Endurance training, Therapeutic exercise, Therapeutic activity, Neuromuscular re-education  PT Plan: Ongoing PT  PT Frequency: 5 times per week (6x prn)  PT Discharge Recommendations:  (Anticipate discharge home with the need for intermittent supervision and assist with higher level mobility tasks and due to impulsivity and decreased safety awareness.)  Equipment Recommended upon Discharge: Wheeled walker  PT Recommended Transfer Status: Assist x1      General Visit Information:   PT  Visit  PT Received On: 10/21/24  General  Prior to Session Communication:  (handoff from OT)  Patient Position Received: Up in chair, Alarm on  General Comment: patient pleasant and cooperative; motivated to participate in therapy    Subjective   Precautions:  Precautions  Medical Precautions: Fall precautions        Objective   Pain:  Pain Assessment  0-10 (Numeric) Pain Score: 0 - No pain     Treatments:  Therapeutic Exercise  Therapeutic Exercise Performed:  (performed standing red tband RLE TKE x20 reps and ABD x10 reps, standing BLE ABD x20 reps without tband. Seated finesse ALLISON and marchalexi with alternating hands taps to knees x20 reps with 2# wts)    Balance/Neuromuscular Re-Education  Balance/Neuromuscular Re-Education Activity Performed:  (forward, backward and sideways amb in parallel bars with focus on increasing LAURA and preventing right knee hyperextension)    Ambulation/Gait Training  Ambulation/Gait Training Performed:  (gait training 50' with ww and min assist, unsteady during turns, NBOS, no right knee hyperextension noted  but was cueing pt throughout to maintain control.)    Transfers  Transfer:  (transfers sit/stand with CGA.)      Education Documentation  Patient educated in safe techniques for gait with a device in order to maximize safety and functional independence.    Patient educated in safe and proper transfer techniques including proper positioning and hand/foot placement to maximize safety and functional independence.         Encounter Problems       Encounter Problems (Active)       PT Problem       STG - Bed mobility with supervision (Progressing)       Start:  10/19/24    Expected End:  10/24/24            STG - Transfers with CGA (Progressing)       Start:  10/19/24    Expected End:  10/24/24            STG - Pt will amb 100 ft with wheeled walker and min/mod assist x1. (Progressing)       Start:  10/19/24    Expected End:  10/24/24            STG - Pt will negotiate 2 stairs with rails and min assist x2. (Progressing)       Start:  10/19/24    Expected End:  10/24/24            LTG - Mod independent bed mobility. (Progressing)       Start:  10/19/24    Expected End:  10/29/24            LTG - Mod independent transfers (Progressing)       Start:  10/19/24    Expected End:  10/29/24            LTG - Pt will amb 150 ft with wheeled walker and SBA (Progressing)       Start:  10/19/24    Expected End:  10/29/24            LTG - Pt will negotiate 5 stairs with rails and min assist x1. (Progressing)       Start:  10/19/24    Expected End:  10/29/24               Pain - Adult

## 2024-10-21 NOTE — PROGRESS NOTES
Occupational Therapy    OT Treatment    Patient Name: Jahaira Moore  MRN: [id number]  Department: [facility] REHAB  Room: [address] [place] [address]  Today's Date: 10/21/2024  Time Calculation  Start Time: 1045  Stop Time: 1130  Time Calculation (min): 45 min      Lamar Cornell OTR/L supervised and approves Maura Osorio S/OT documentation and treatment of this patient.      Assessment:  End of Session Communication: Bedside nurse  End of Session Patient Position: Up in chair, Alarm on     Plan:  Treatment Interventions: ADL retraining, Endurance training, Patient/family training, Neuromuscular reeducation, Functional transfer training  OT Frequency: 90 min/5 days per week (x 10 days)  OT Discharge Recommendations:  (anticipate need for initial supervision with ADLs, transfers, and mobility secondary to decreased safety awareness)  Equipment Recommended upon Discharge:  (3-in-1 commode, wheeled walker, tub seat, reacher)  Treatment Interventions: ADL retraining, Endurance training, Patient/family training, Neuromuscular reeducation, Functional transfer training    Subjective   Previous Visit Info:  OT Last Visit  OT Received On: 10/21/24  General:  General  Prior to Session Communication: Bedside nurse  Patient Position Received: Alarm on, Up in chair  General Comment: patient pleasant and cooperative; motivated to participate in therapy  Precautions:  Precautions Comment: Falls      Pain:  Pain Assessment  Pain Assessment: 0-10  0-10 (Numeric) Pain Score: 0 - No pain  Pain Location: Wrist (Right Wrist)  Pain Interventions: Repositioned, Distraction    Objective    Bed Mobility/Transfers: Transfers  Transfer:  (Completed sit<>stand transfers unsupported with CGA-Min A; noted patient locks bilateral knees in extension when static standing during transfers; stand pivot from w/c to recliner with Min assist with no walker and noted impulsive and vc for safety)    Shower Transfers  Shower Transfers Comments: Tub shower transfer with shower  chair and use of tub-mounted grab bar; minimal verbal cues for correct positioning and hand placement with up to Min assist    Functional Mobility:  Functional Mobility  Functional Mobility Performed:  (Completed functional mobility with hand held support to shower with Min x 2; required increased time and support secondary to decreased RLE control, balance, coordination, and strength; continue to recommend wheeled walker for mobility vs. No device at this time for improved safety/independence       Therapy/Activity: Therapeutic Activity  Therapeutic Activity Performed:  (Beanbag toss unsupported with variable movements including weightshifting, cathching, throwing, and reaching outside base of support w/ up to Min assist; x2 LOB moderate assist to recover to facilitate safety and balance required for ADLs and mobility)    OP EDUCATION:  Education  Individual(s) Educated: Patient  Education Provided:  (ADL training, safety training during transitonal movements and mobility, DME training and education)  Patient Response to Education: Patient/Caregiver Verbalized Understanding of Information  Education Comment:  (Patient to benefit from continued education and training)    Goals:  Encounter Problems       Encounter Problems (Active)       OT Problem       LTG - Patient will complete grooming with set up. (Progressing)       Start:  10/19/24    Expected End:  10/29/24            LTG - Patient will complete toileting with supervision. (Progressing)       Start:  10/19/24    Expected End:  10/29/24            LTG - Patient will complete bathing with supervision. (Progressing)       Start:  10/19/24    Expected End:  10/29/24            LTG - Patient will complete UE dressing with set up. (Progressing)       Start:  10/19/24    Expected End:  10/29/24            LTG - Patient will complete LE dressing using adaptive equip as needed with supervision. (Progressing)       Start:  10/19/24    Expected End:  10/29/24             LTG - Patient will complete commode transfer via device as needed with supervision. (Progressing)       Start:  10/19/24    Expected End:  10/29/24            LTG - Patient will complete tub/shower transfer using DME as needed with sba. (Progressing)       Start:  10/19/24    Expected End:  10/29/24            LTG - Patient will complete functional mobility via device as needed with s/sba. (Progressing)       Start:  10/19/24    Expected End:  10/29/24            LTG - Patient will complete light meal prep or kitchen access with sba. (Progressing)       Start:  10/19/24    Expected End:  10/29/24            STG - Patient will complete toileting with cga. (Progressing)       Start:  10/19/24    Expected End:  10/26/24            STG - Patient will complete bathing with sba. (Progressing)       Start:  10/19/24    Expected End:  10/26/24            STG - Patient will complete LE dressing using adaptive equip as needed with cga. (Progressing)       Start:  10/19/24    Expected End:  10/26/24            STG - Patient will complete commode transfer via device as needed with cga. (Progressing)       Start:  10/19/24    Expected End:  10/26/24            STG - Patient will complete tub/shower transfer using DME as needed with min assist. (Progressing)       Start:  10/19/24    Expected End:  10/26/24            STG - Patient will complete functional mobility via device as needed with min assist. (Progressing)       Start:  10/19/24    Expected End:  10/26/24

## 2024-10-21 NOTE — PROGRESS NOTES
"Jahaira Moore is a 69 y.o. female on day 3 of admission presenting with Cerebrovascular accident (CVA) due to occlusion of right vertebral artery (Multi).    Subjective   The patient was seen at bedsdie and in therapy.  Eating and sleeping well.  Tolerating therapy without complicatoin.  States she has pain in her right wrist form when she fell.  Rates pain as 5/10.  Deny stiffness or spasticity.  Having regular BM. Refusing rectal suppostory and senna laxitive. States she had balance issues at base line.       Objective     Physical Exam  Constitutional: Appears stated age. In NAD.   HEENT: NC/AT, EOMI, clear sclera, moist mucous membranes  CV: RRR, No M/R/G  PULM: CTAB, no coughing or wheezing  ABDOMEN: Soft, NT/ND. No TTP  SKIN: Normal Color, Warm, Dry, No Rashes   EXTREMITIES: Non-Tender, Full ROM, No Pedal Edema  NEURO: A&O x 4, able to perform finger to nose, rapid hand movements and coordinated hand movements.  Able to perform heel to shin bilaterally.  Forceful heel strike bilaterally with gait.   PSYCH: Normal Mood & Behavior      Last Recorded Vitals  Blood pressure 135/70, pulse 59, temperature 36.2 °C (97.2 °F), temperature source Tympanic, resp. rate 15, height 1.6 m (5' 3\"), weight 61.2 kg (135 lb), SpO2 95%.  Intake/Output last 3 Shifts:  I/O last 3 completed shifts:  In: 120 (2 mL/kg) [P.O.:120]  Out: - (0 mL/kg)   Weight: 61.2 kg     Relevant Results                              Assessment/Plan   Assessment & Plan  Cerebrovascular accident (CVA) due to occlusion of right vertebral artery (Multi)    1.  CVA  Aspirin 81 mg daily  Lipitor 40 mg daily  Plavix 75 mg daily  Begin physical therapy for  transfers, bed mobility, lower extremity strengthening and coordination, gait training with an assistive device  Begin occupational therapy for ADL retraining and ADL activities, transfers, exercises to improve her coordination     2.  Hyperlipidemia  Lipitor 40 mg daily     3.  Rheumatoid arthritis   prednisone " 6 mg and 1 mg daily     4.  History of depression  Wellbutrin 300 mg monitor for adverse mood changes  Cymbalta 90 mg daily  Trazodone 50 mg at at bedtime  Neurontin 300 mg 3 times a day     5.  DVT prophylaxis  Lovenox 40 mg every 24 hours     6.  GI protectant  Protonix 40 mg daily     7.  Elevated BUN to creatinine  Replete blood work, encourage fluids     8. CAHGOI  She is on DVT prophylaxis with Lovenox  She is on GI protectant with Protonix  She is constipated and is a bowel program has been started  She is a fall risk fall precautions are being utilized    10/21  - Patient making improvment with gait and balance in therapy, continue therapy  - Scr trending up, encourage fluid intake  - Voltaren gel for wrist pain.  - Follow up with BMP for Scr  - Discharge planning         I spent 36 minutes in the professional and overall care of this patient.       Sebastian Greene, DO  PM&R  PGY-2

## 2024-10-21 NOTE — PROGRESS NOTES
Occupational Therapy    OT Treatment    Patient Name: Jahaira Moore  MRN: [id number]  Department: [facility] REHAB  Room: [address] [place] [address]  Today's Date: 10/21/2024  Time Calculation  Start Time: 0830  Stop Time: 0915  Time Calculation (min): 45 min    Lamar Cornell OTR/L supervised and approves Maura Osorio S/OT documentation and treatment of this patient.          Assessment:  End of Session Communication:  (Handoff to PT)  End of Session Patient Position: Up in chair, Alarm on     Plan:  Treatment Interventions: ADL retraining, Endurance training, Patient/family training, Neuromuscular reeducation, Functional transfer training  OT Frequency: 90 min/5 days per week (x 10 days)  OT Discharge Recommendations:  (anticipate need for initial supervision with ADLs, transfers, and mobility secondary to decreased safety awareness)  Equipment Recommended upon Discharge:  (3-in-1 commode, wheeled walker, tub seat, reacher)  Treatment Interventions: ADL retraining, Endurance training, Patient/family training, Neuromuscular reeducation, Functional transfer training    Subjective   Previous Visit Info:  OT Last Visit  OT Received On: 10/21/24  General:  General  Prior to Session Communication: Bedside nurse  Patient Position Received: Alarm on (in recliner)  General Comment:  (Patient motivated and pleasent during session)  Precautions:  Precautions Comment:  (Falls)      Pain:  Pain Assessment  Pain Assessment: 0-10  0-10 (Numeric) Pain Score: 5 - Moderate pain  Pain Location: Wrist (Right Wrist)  Pain Interventions: Repositioned, Distraction    Objective    Activities of Daily Living: Grooming  Grooming Comments: S/U for grooming tasks at sink sitting in wheelchair    UE Dressing  UE Dressing Comments: Kyaw UB shirt and bra with S/U during sitting with good dynamic balance and coordination with bilateral UEs    LE Dressing  LE Dressing:  (S/U for kyaw pants over feet and knees while demonstrating figure four technique; required CGA for  jerry pants over hips for balance and stability)     Bed Mobility/Transfers: Transfers  Transfer:  (Sit<>stand transfers with CGA and verbal cues for safety, hand and foot positioning, and body positioning; stand-pivot from recliner to wheelchair with wheeled walker with Min assist for balance     Functional Mobility:  Functional Mobility  Functional Mobility Performed:  (Min assist during funcional mobiliy with wheeled walker; for balance and stability secondary to decreased RLE control, coordination, strength and noted knee hyperextension and heel strike due to difficulty with foot placement)  Standing Balance:  Dynamic Standing Balance  Dynamic Standing-Comments: Reaching towards uninvolved LUE side across midline at waist level with target toss with no walker to faciliate safety during functional mobility and ADLs; patient unable to perform without LUE table top support for balace and stability       Therapy/Activity: Therapeutic Activity  Therapeutic Activity Performed:  (Completed item retrieval at waist level via wheeled walker reaching outside LAURA to faciliate functional task performance with Min assist;)  Therapeutic Activity 1:  (noted x1 LOB with Mod assist to recover secondary to difficulty with foot placement and coordination during functional mobility)      OP EDUCATION:  Education  Individual(s) Educated: Patient  Education Provided:  (walker safety, energy conservation, safety training during functional mobiliy and transfers, ADL training)  Patient Response to Education: Patient/Caregiver Verbalized Understanding of Information    Goals:  Encounter Problems       Encounter Problems (Active)       OT Problem       LTG - Patient will complete grooming with set up. (Progressing)       Start:  10/19/24    Expected End:  10/29/24            LTG - Patient will complete toileting with supervision. (Progressing)       Start:  10/19/24    Expected End:  10/29/24            LTG - Patient will complete bathing  with supervision. (Progressing)       Start:  10/19/24    Expected End:  10/29/24            LTG - Patient will complete UE dressing with set up. (Progressing)       Start:  10/19/24    Expected End:  10/29/24            LTG - Patient will complete LE dressing using adaptive equip as needed with supervision. (Progressing)       Start:  10/19/24    Expected End:  10/29/24            LTG - Patient will complete commode transfer via device as needed with supervision. (Progressing)       Start:  10/19/24    Expected End:  10/29/24            LTG - Patient will complete tub/shower transfer using DME as needed with sba. (Progressing)       Start:  10/19/24    Expected End:  10/29/24            LTG - Patient will complete functional mobility via device as needed with s/sba. (Progressing)       Start:  10/19/24    Expected End:  10/29/24            LTG - Patient will complete light meal prep or kitchen access with sba. (Progressing)       Start:  10/19/24    Expected End:  10/29/24            STG - Patient will complete toileting with cga. (Progressing)       Start:  10/19/24    Expected End:  10/26/24            STG - Patient will complete bathing with sba. (Progressing)       Start:  10/19/24    Expected End:  10/26/24            STG - Patient will complete LE dressing using adaptive equip as needed with cga. (Progressing)       Start:  10/19/24    Expected End:  10/26/24            STG - Patient will complete commode transfer via device as needed with cga. (Progressing)       Start:  10/19/24    Expected End:  10/26/24            STG - Patient will complete tub/shower transfer using DME as needed with min assist. (Progressing)       Start:  10/19/24    Expected End:  10/26/24            STG - Patient will complete functional mobility via device as needed with min assist. (Progressing)       Start:  10/19/24    Expected End:  10/26/24

## 2024-10-22 LAB
ANION GAP SERPL CALC-SCNC: 12 MMOL/L (ref 10–20)
BUN SERPL-MCNC: 34 MG/DL (ref 6–23)
CALCIUM SERPL-MCNC: 9.2 MG/DL (ref 8.6–10.3)
CHLORIDE SERPL-SCNC: 103 MMOL/L (ref 98–107)
CO2 SERPL-SCNC: 28 MMOL/L (ref 21–32)
CREAT SERPL-MCNC: 1.4 MG/DL (ref 0.5–1.05)
EGFRCR SERPLBLD CKD-EPI 2021: 40 ML/MIN/1.73M*2
GLUCOSE SERPL-MCNC: 98 MG/DL (ref 74–99)
POTASSIUM SERPL-SCNC: 4.1 MMOL/L (ref 3.5–5.3)
SODIUM SERPL-SCNC: 139 MMOL/L (ref 136–145)

## 2024-10-22 PROCEDURE — 97110 THERAPEUTIC EXERCISES: CPT | Mod: GP,CQ

## 2024-10-22 PROCEDURE — 97530 THERAPEUTIC ACTIVITIES: CPT | Mod: GP,CQ

## 2024-10-22 PROCEDURE — 97112 NEUROMUSCULAR REEDUCATION: CPT | Mod: GO

## 2024-10-22 PROCEDURE — 97112 NEUROMUSCULAR REEDUCATION: CPT | Mod: GP,CQ

## 2024-10-22 PROCEDURE — 97761 PROSTHETIC TRAING 1ST ENC: CPT | Mod: GP,CQ

## 2024-10-22 PROCEDURE — 99232 SBSQ HOSP IP/OBS MODERATE 35: CPT

## 2024-10-22 PROCEDURE — 2500000004 HC RX 250 GENERAL PHARMACY W/ HCPCS (ALT 636 FOR OP/ED)

## 2024-10-22 PROCEDURE — 2500000001 HC RX 250 WO HCPCS SELF ADMINISTERED DRUGS (ALT 637 FOR MEDICARE OP)

## 2024-10-22 PROCEDURE — 97530 THERAPEUTIC ACTIVITIES: CPT | Mod: GO

## 2024-10-22 PROCEDURE — 1180000001 HC REHAB PRIVATE ROOM DAILY

## 2024-10-22 PROCEDURE — 80048 BASIC METABOLIC PNL TOTAL CA: CPT | Performed by: PHYSICAL MEDICINE & REHABILITATION

## 2024-10-22 PROCEDURE — 36415 COLL VENOUS BLD VENIPUNCTURE: CPT | Performed by: PHYSICAL MEDICINE & REHABILITATION

## 2024-10-22 PROCEDURE — 97116 GAIT TRAINING THERAPY: CPT | Mod: GP,CQ

## 2024-10-22 ASSESSMENT — PAIN SCALES - GENERAL
PAINLEVEL_OUTOF10: 8
PAINLEVEL_OUTOF10: 0 - NO PAIN
PAINLEVEL_OUTOF10: 8
PAINLEVEL_OUTOF10: 7
PAINLEVEL_OUTOF10: 0 - NO PAIN
PAINLEVEL_OUTOF10: 0 - NO PAIN
PAINLEVEL_OUTOF10: 8
PAINLEVEL_OUTOF10: 8
PAINLEVEL_OUTOF10: 4

## 2024-10-22 ASSESSMENT — PAIN - FUNCTIONAL ASSESSMENT
PAIN_FUNCTIONAL_ASSESSMENT: 0-10

## 2024-10-22 ASSESSMENT — PAIN DESCRIPTION - LOCATION
LOCATION: BACK
LOCATION: WRIST

## 2024-10-22 ASSESSMENT — PAIN DESCRIPTION - ORIENTATION
ORIENTATION: RIGHT
ORIENTATION: LOWER;LEFT

## 2024-10-22 ASSESSMENT — PAIN SCALES - WONG BAKER: WONGBAKER_NUMERICALRESPONSE: HURTS WHOLE LOT

## 2024-10-22 ASSESSMENT — PAIN DESCRIPTION - DESCRIPTORS: DESCRIPTORS: DISCOMFORT

## 2024-10-22 NOTE — PROGRESS NOTES
Occupational Therapy    OT Treatment    Patient Name: Jahaira Moore  MRN: [id number]  Department: [facility] REHAB  Room: [address] [place] [address]  Today's Date: 10/22/2024  Time Calculation  Start Time: 0915  Stop Time: 1000  Time Calculation (min): 45 min        Assessment:  End of Session Communication: Bedside nurse  End of Session Patient Position: Alarm on, Up in chair     Plan:  Treatment Interventions: ADL retraining, Endurance training, Patient/family training, Neuromuscular reeducation, Functional transfer training  OT Frequency: 90 min/5 days per week (x 10 days)  OT Discharge Recommendations:  (anticipate need for initial supervision with ADLs, transfers, and mobility secondary to decreased safety awareness)  Equipment Recommended upon Discharge:  (3-in-1 commode, wheeled walker, tub seat, reacher)  Treatment Interventions: ADL retraining, Endurance training, Patient/family training, Neuromuscular reeducation, Functional transfer training    Subjective   Previous Visit Info:  OT Last Visit  OT Received On: 10/22/24    General:  General  Prior to Session Communication:  (handoff from PT)  Patient Position Received: Alarm on, Up in chair  General Comment: patient pleasant, cooperative, and highly motivated    Precautions:  Medical Precautions: Fall precautions            Pain:  Pain Assessment  Pain Assessment: 0-10  0-10 (Numeric) Pain Score: 8  Pain Type:  (chronic lower back pain)  Pain Interventions: Distraction, Repositioned  Response to Interventions: pain decreased    Objective         Bed Mobility/Transfers: Transfers  Transfer:  (cga sit to stand transfers from wheelchair)    Toilet Transfers  Toilet Transfers Comments: cga transfers to/from 3-in-1 commode via wheeled walker; also completed multiple sit to stand transfers from higher commode without arms (to simulate home set up) with patient also requiring cga    Functional Mobility:  Functional Mobility  Functional Mobility Performed:   challenging functional mobility  via wheeled walker, requiring patient to maneuver around obstacles and side-step through narrow spaces, with min assist for mobility without turns and in open spaces; up to mod assist for mobility during side-stepping and tight turns; patient with several LOB to right; noted frequent scissoring and difficulty with right foot placement secondary to incoordination/ataxia; up to mod cues for walker safety and to slow pace and focus on placement of RLE; patient distracted/talking at times during mobility, affecting patient's balance and concentration as well     Dynamic Standing balance:  dynamic standing balance requiring patient to alternate each LE taking one step forward, tossing bean bag, then stepping back; overall requiring mod assist to maintain standing balance; several LOB during task noted; attempts to brace LEs on wheelchair to avoid losing balance        EDUCATION:  Education  Individual(s) Educated: Patient  Education Provided:  (recommended bathroom DME, walker safety, safe transfers)  Patient Response to Education:  (patient will continue to benefit from further education)    Goals:  Encounter Problems       Encounter Problems (Active)       OT Problem       LTG - Patient will complete grooming with set up. (Progressing)       Start:  10/19/24    Expected End:  10/29/24            LTG - Patient will complete toileting with supervision. (Progressing)       Start:  10/19/24    Expected End:  10/29/24            LTG - Patient will complete bathing with supervision. (Progressing)       Start:  10/19/24    Expected End:  10/29/24            LTG - Patient will complete UE dressing with set up. (Progressing)       Start:  10/19/24    Expected End:  10/29/24            LTG - Patient will complete LE dressing using adaptive equip as needed with supervision. (Progressing)       Start:  10/19/24    Expected End:  10/29/24            LTG - Patient will complete commode transfer via device as needed with supervision.  (Progressing)       Start:  10/19/24    Expected End:  10/29/24            LTG - Patient will complete tub/shower transfer using DME as needed with sba. (Progressing)       Start:  10/19/24    Expected End:  10/29/24            LTG - Patient will complete functional mobility via device as needed with s/sba. (Progressing)       Start:  10/19/24    Expected End:  10/29/24            LTG - Patient will complete light meal prep or kitchen access with sba. (Progressing)       Start:  10/19/24    Expected End:  10/29/24            STG - Patient will complete toileting with cga. (Progressing)       Start:  10/19/24    Expected End:  10/26/24            STG - Patient will complete bathing with sba. (Progressing)       Start:  10/19/24    Expected End:  10/26/24            STG - Patient will complete LE dressing using adaptive equip as needed with cga. (Progressing)       Start:  10/19/24    Expected End:  10/26/24            STG - Patient will complete commode transfer via device as needed with cga. (Progressing)       Start:  10/19/24    Expected End:  10/26/24            STG - Patient will complete tub/shower transfer using DME as needed with min assist. (Progressing)       Start:  10/19/24    Expected End:  10/26/24            STG - Patient will complete functional mobility via device as needed with min assist. (Progressing)       Start:  10/19/24    Expected End:  10/26/24

## 2024-10-22 NOTE — PROGRESS NOTES
Physical Therapy    Physical Therapy Treatment    Patient Name: Jahaira Moore  MRN: [id number]  Department: [facility] REHAB  Room: [address] [place] [address]  Today's Date: 10/22/2024  Time Calculation  Start Time: 1345  Stop Time: 1430  Time Calculation (min): 45 min         Assessment/Plan   PT Assessment  End of Session Patient Position: Up in chair, Alarm on     PT Plan  Treatment/Interventions: Bed mobility, Transfer training, Gait training, Stair training, Balance training, Strengthening, Endurance training, Therapeutic exercise, Therapeutic activity, Neuromuscular re-education  PT Plan: Ongoing PT  PT Frequency: 5 times per week (6x prn)  PT Discharge Recommendations:  (Anticipate discharge home with the need for intermittent supervision and assist with higher level mobility tasks and due to impulsivity and decreased safety awareness.)  Equipment Recommended upon Discharge: Wheeled walker  PT Recommended Transfer Status: Assist x1      General Visit Information:   PT  Visit  PT Received On: 10/22/24  General  Patient Position Received: Up in chair, Alarm on  General Comment: patient pleasant, cooperative, and highly motivated    Subjective   Precautions:  Precautions  Medical Precautions: Fall precautions        Objective   Pain:  Pain Assessment  0-10 (Numeric) Pain Score: 8  Pain Type: Chronic pain  Pain Location: Back  Pain Interventions: Repositioned, Distraction  Cognition:  Cognition  Insight: Mild  Impulsive: Moderately     Treatments:  Therapeutic Exercise  Therapeutic Exercise Performed:  (performed standing BLE TKE and ABD against red tband x20 reps.)    Balance/Neuromuscular Re-Education  Balance/Neuromuscular Re-Education Activity Performed:  (forward, backward and sideways amb in parallel bars with BUE and left UE support. Static stand on flat surface and on balance cushion with WBOS, NBOS and eyes open/closed. Increased difficulty with NBOS and unable to perform NBOS with eyes closed)    Ambulation/Gait  Training  Ambulation/Gait Training Performed:  (gait training 100' x2 on tile and 30' x2 on carpet with ww and min assist. Continues to exhibit NBOS, occ scissoring and LOB to right.)    Transfers  Transfer:  (transfer training sit/stand with CGA/SBA.)    Education Documentation  Patient educated in safe techniques for gait with a device in order to maximize safety and functional independence.    Patient educated in safe and proper transfer techniques including proper positioning and hand/foot placement to maximize safety and functional independence.         Encounter Problems       Encounter Problems (Active)       PT Problem       STG - Bed mobility with supervision (Progressing)       Start:  10/19/24    Expected End:  10/24/24            STG - Transfers with CGA (Progressing)       Start:  10/19/24    Expected End:  10/24/24            STG - Pt will amb 100 ft with wheeled walker and min/mod assist x1. (Progressing)       Start:  10/19/24    Expected End:  10/24/24            STG - Pt will negotiate 2 stairs with rails and min assist x2. (Progressing)       Start:  10/19/24    Expected End:  10/24/24            LTG - Mod independent bed mobility. (Progressing)       Start:  10/19/24    Expected End:  10/29/24            LTG - Mod independent transfers (Progressing)       Start:  10/19/24    Expected End:  10/29/24            LTG - Pt will amb 150 ft with wheeled walker and SBA (Progressing)       Start:  10/19/24    Expected End:  10/29/24            LTG - Pt will negotiate 5 stairs with rails and min assist x1. (Progressing)       Start:  10/19/24    Expected End:  10/29/24               Pain - Adult

## 2024-10-22 NOTE — CARE PLAN
The patient's goals for the shift include      The clinical goals for the shift include Remain hemodynamically stable, get some quality rest for PT/OT in the AM      Problem: Pain - Adult  Goal: Verbalizes/displays adequate comfort level or baseline comfort level  Outcome: Progressing     Problem: Discharge Planning  Goal: Discharge to home or other facility with appropriate resources  Outcome: Progressing     Problem: Chronic Conditions and Co-morbidities  Goal: Patient's chronic conditions and co-morbidity symptoms are monitored and maintained or improved  Outcome: Progressing     Problem: Fall/Injury  Goal: Verbalize understanding of risk factor reduction measures to prevent injury from fall in the home  Outcome: Progressing  Goal: Use assistive devices by end of the shift  Outcome: Progressing  Goal: Pace activities to prevent fatigue by end of the shift  Outcome: Progressing     Problem: Pain  Goal: Takes deep breaths with improved pain control throughout the shift  Outcome: Progressing  Goal: Turns in bed with improved pain control throughout the shift  Outcome: Progressing  Goal: Walks with improved pain control throughout the shift  Outcome: Progressing  Goal: Performs ADL's with improved pain control throughout shift  Outcome: Progressing  Goal: Participates in PT with improved pain control throughout the shift  Outcome: Progressing     Problem: Skin  Goal: Decreased wound size/increased tissue granulation at next dressing change  Outcome: Progressing  Flowsheets (Taken 10/21/2024 2236)  Decreased wound size/increased tissue granulation at next dressing change: Promote sleep for wound healing  Goal: Participates in plan/prevention/treatment measures  Outcome: Progressing  Flowsheets (Taken 10/21/2024 2236)  Participates in plan/prevention/treatment measures: Elevate heels  Goal: Prevent/manage excess moisture  Outcome: Progressing  Flowsheets (Taken 10/21/2024 2236)  Prevent/manage excess moisture: Monitor  for/manage infection if present  Goal: Prevent/minimize sheer/friction injuries  Outcome: Progressing  Flowsheets (Taken 10/21/2024 2236)  Prevent/minimize sheer/friction injuries:   Use pull sheet   Complete micro-shifts as needed if patient unable. Adjust patient position to relieve pressure points, not a full turn  Goal: Promote/optimize nutrition  Outcome: Progressing  Flowsheets (Taken 10/21/2024 2236)  Promote/optimize nutrition: Monitor/record intake including meals  Goal: Promote skin healing  Outcome: Progressing  Flowsheets (Taken 10/21/2024 2236)  Promote skin healing:   Assess skin/pad under line(s)/device(s)   Turn/reposition every 2 hours/use positioning/transfer devices

## 2024-10-22 NOTE — PROGRESS NOTES
"Jahaira Moore is a 69 y.o. female on day 4 of admission presenting with Cerebrovascular accident (CVA) due to occlusion of right vertebral artery (Multi).    Subjective   The patient was seen at bedside. No acute events overnight.  The patient is eating well and sleping well.  She continues to see improvement in therapy.  Patient's Scr is beginning to trend down.  She has pain in her right wrist and lumbar spine.  She has not tried the voltaren gel yet.  Patient took PRN tylenol.  She is having regular BM.  Denies stiffness or spasticity.       Objective     Physical Exam  Constitutional: Appears stated age. In NAD.   HEENT: NC/AT, EOMI, clear sclera, moist mucous membranes  CV: RRR, No M/R/G  PULM: CTAB, no coughing or wheezing  ABDOMEN: Soft, NT/ND. No TTP  SKIN: Normal Color, Warm, Dry, No Rashes   EXTREMITIES: Non-Tender, Full ROM, No Pedal Edema  NEURO: A&O x 4, slight ataxic movement of RLE with walking   PSYCH: Normal Mood & Behavior    Function: Min assist.    Last Recorded Vitals  Blood pressure 129/59, pulse 57, temperature 35.9 °C (96.6 °F), temperature source Temporal, resp. rate 16, height 1.6 m (5' 3\"), weight 61.2 kg (135 lb), SpO2 95%.  Intake/Output last 3 Shifts:  I/O last 3 completed shifts:  In: 120 (2 mL/kg) [P.O.:120]  Out: - (0 mL/kg)   Weight: 61.2 kg     Relevant Results                             Assessment/Plan   Assessment & Plan  Cerebrovascular accident (CVA) due to occlusion of right vertebral artery (Multi)    1.  CVA  Aspirin 81 mg daily  Lipitor 40 mg daily  Plavix 75 mg daily  Begin physical therapy for  transfers, bed mobility, lower extremity strengthening and coordination, gait training with an assistive device  Begin occupational therapy for ADL retraining and ADL activities, transfers, exercises to improve her coordination     2.  Hyperlipidemia  Lipitor 40 mg daily     3.  Rheumatoid arthritis   prednisone 6 mg and 1 mg daily     4.  History of depression  Wellbutrin 300 mg " monitor for adverse mood changes  Cymbalta 90 mg daily  Trazodone 50 mg at at bedtime  Neurontin 300 mg 3 times a day     5.  DVT prophylaxis  Lovenox 40 mg every 24 hours     6.  GI protectant  Protonix 40 mg daily     7.  Elevated BUN to creatinine  Replete blood work, encourage fluids     8. LILLIAM  She is on DVT prophylaxis with Lovenox  She is on GI protectant with Protonix  She is constipated and is a bowel program has been started  She is a fall risk fall precautions are being utilized    10/22  -Patient continues to make improvement with gait and balance, continue therapy  -Tizanidine ordered for low back pain  -Counseled on use of Voltaren gel  -Counseled   increase fluid consumption for MONIQUE.  -continue Asprin, Plavix and atorvastatin for stroke ppx.         I spent 32 minutes in the professional and overall care of this patient.       Sebastian Greene, DO  PM&R  PGY-2

## 2024-10-22 NOTE — CARE PLAN
The patient's goals for the shift include  Participate in scheduled therapy     The clinical goals for the shift include pt will remain HD stable    Problem: Pain - Adult  Goal: Verbalizes/displays adequate comfort level or baseline comfort level  Outcome: Progressing     Problem: Discharge Planning  Goal: Discharge to home or other facility with appropriate resources  Outcome: Progressing     Problem: Chronic Conditions and Co-morbidities  Goal: Patient's chronic conditions and co-morbidity symptoms are monitored and maintained or improved  Outcome: Progressing     Problem: Fall/Injury  Goal: Verbalize understanding of risk factor reduction measures to prevent injury from fall in the home  Outcome: Progressing  Goal: Use assistive devices by end of the shift  Outcome: Progressing  Goal: Pace activities to prevent fatigue by end of the shift  Outcome: Progressing     Problem: Pain  Goal: Takes deep breaths with improved pain control throughout the shift  Outcome: Progressing  Goal: Turns in bed with improved pain control throughout the shift  Outcome: Progressing  Goal: Walks with improved pain control throughout the shift  Outcome: Progressing  Goal: Performs ADL's with improved pain control throughout shift  Outcome: Progressing  Goal: Participates in PT with improved pain control throughout the shift  Outcome: Progressing     Problem: Skin  Goal: Decreased wound size/increased tissue granulation at next dressing change  Outcome: Progressing  Flowsheets (Taken 10/22/2024 1006)  Decreased wound size/increased tissue granulation at next dressing change:   Promote sleep for wound healing   Protective dressings over bony prominences  Goal: Participates in plan/prevention/treatment measures  Outcome: Progressing  Flowsheets (Taken 10/22/2024 1006)  Participates in plan/prevention/treatment measures:   Elevate heels   Increase activity/out of bed for meals  Goal: Prevent/manage excess moisture  Outcome:  Progressing  Flowsheets (Taken 10/22/2024 1006)  Prevent/manage excess moisture:   Monitor for/manage infection if present   Follow provider orders for dressing changes   Moisturize dry skin   Cleanse incontinence/protect with barrier cream  Goal: Prevent/minimize sheer/friction injuries  Outcome: Progressing  Flowsheets (Taken 10/22/2024 1006)  Prevent/minimize sheer/friction injuries:   Use pull sheet   Increase activity/out of bed for meals   HOB 30 degrees or less   Turn/reposition every 2 hours/use positioning/transfer devices  Goal: Promote/optimize nutrition  Outcome: Progressing  Flowsheets (Taken 10/22/2024 1006)  Promote/optimize nutrition:   Monitor/record intake including meals   Consume > 50% meals/supplements   Offer water/supplements/favorite foods  Goal: Promote skin healing  Outcome: Progressing  Flowsheets (Taken 10/22/2024 1006)  Promote skin healing: Turn/reposition every 2 hours/use positioning/transfer devices

## 2024-10-22 NOTE — PROGRESS NOTES
Occupational Therapy    OT Treatment    Patient Name: Jahaira Moore  MRN: [id number]  Department: [facility] REH[facility]  Room: [address] [place] [address]  Today's Date: 10/22/2024  Time Calculation  Start Time: 1045  Stop Time: 1130  Time Calculation (min): 45 min        Assessment:  End of Session Communication: Bedside nurse  End of Session Patient Position: Alarm on, Up in chair     Plan:  Treatment Interventions: ADL retraining, Endurance training, Patient/family training, Neuromuscular reeducation, Functional transfer training  OT Frequency: 90 min/5 days per week (x 10 days)  OT Discharge Recommendations:  (anticipate need for initial supervision with ADLs, transfers, and mobility secondary to decreased safety awareness)  Equipment Recommended upon Discharge:  (3-in-1 commode, wheeled walker, tub seat, reacher)  Treatment Interventions: ADL retraining, Endurance training, Patient/family training, Neuromuscular reeducation, Functional transfer training    Subjective   Previous Visit Info:  OT Last Visit  OT Received On: 10/22/24    General:  General  Family/Caregiver Present: Yes  Caregiver Feedback: sister Hortencia present for family training  Prior to Session Communication: Bedside nurse  Patient Position Received: Up in chair, Alarm on  General Comment: patient pleasant, cooperative, and highly motivated    Precautions:  Medical Precautions: Fall precautions            Pain:  Pain Assessment  Pain Assessment: 0-10  0-10 (Numeric) Pain Score: 8  Pain Type:  (chronic low back pain)  Pain Interventions: Repositioned, Distraction  Response to Interventions: pain decreases with rest    Objective         Bed Mobility/Transfers: Transfers  Transfer:  (cga for most sit to stand transfers (from recliner, wheelchair, commode); min assist for stand to sit to recliner with one LOB posteriorly when patient attempting to complete transfers without any UE support (reports she is attempting to increase LE strength by transferring without ue support)    Toilet  Transfers  Toilet Transfers Comments: cga transfers to/from 3-in-1 commode with and without ue support; continue to recommend use of rails on commode to improve ease and safety of transfers; sister reports there is a 3-in-1 commode available for use from a family member    Tub Transfers  Tub Transfers Comments: min assist tub transfer using tub seat and tub-mounted grab bar via wheeled walker; brings LEs in/out of tub while seated in tub seat with cga; min cues for safety      Functional Mobility:  Functional Mobility  Functional Mobility Performed:  (simple functional mobility/bathroom access completed with min/mod assist via wheeled walker; continues to frequently lose balance during mobility and present with difficulty with right foot placement/control. Improved balance noted when pt cued to focus and distractions are lessened    EDUCATION:  Education  Individual(s) Educated: Patient (sister)  Education Provided:  (sister educated on patient's progress, current functional status, and recommendations for initial assist at discharge; sister supportive and demonstrates good understanding of recommendations. Issued handouts on recommended tub seat and tub grab bar)    Goals:  Encounter Problems       Encounter Problems (Active)       OT Problem       LTG - Patient will complete grooming with set up. (Progressing)       Start:  10/19/24    Expected End:  10/29/24            LTG - Patient will complete toileting with supervision. (Progressing)       Start:  10/19/24    Expected End:  10/29/24            LTG - Patient will complete bathing with supervision. (Progressing)       Start:  10/19/24    Expected End:  10/29/24            LTG - Patient will complete UE dressing with set up. (Progressing)       Start:  10/19/24    Expected End:  10/29/24            LTG - Patient will complete LE dressing using adaptive equip as needed with supervision. (Progressing)       Start:  10/19/24    Expected End:  10/29/24             LTG - Patient will complete commode transfer via device as needed with supervision. (Progressing)       Start:  10/19/24    Expected End:  10/29/24            LTG - Patient will complete tub/shower transfer using DME as needed with sba. (Progressing)       Start:  10/19/24    Expected End:  10/29/24            LTG - Patient will complete functional mobility via device as needed with s/sba. (Progressing)       Start:  10/19/24    Expected End:  10/29/24            LTG - Patient will complete light meal prep or kitchen access with sba. (Progressing)       Start:  10/19/24    Expected End:  10/29/24            STG - Patient will complete toileting with cga. (Progressing)       Start:  10/19/24    Expected End:  10/26/24            STG - Patient will complete bathing with sba. (Progressing)       Start:  10/19/24    Expected End:  10/26/24            STG - Patient will complete LE dressing using adaptive equip as needed with cga. (Progressing)       Start:  10/19/24    Expected End:  10/26/24            STG - Patient will complete commode transfer via device as needed with cga. (Progressing)       Start:  10/19/24    Expected End:  10/26/24            STG - Patient will complete tub/shower transfer using DME as needed with min assist. (Progressing)       Start:  10/19/24    Expected End:  10/26/24            STG - Patient will complete functional mobility via device as needed with min assist. (Progressing)       Start:  10/19/24    Expected End:  10/26/24

## 2024-10-22 NOTE — PROGRESS NOTES
Physical Therapy    Physical Therapy Treatment    Patient Name: Jahaira Moore  MRN: [id number]  Department: [facility] REHAB  Room: [address] [place] [address]  Today's Date: 10/22/2024  Time Calculation  Start Time: 0830  Stop Time: 0915  Time Calculation (min): 45 min         Assessment/Plan   PT Assessment  End of Session Patient Position: Up in chair, Alarm on     PT Plan  Treatment/Interventions: Bed mobility, Transfer training, Gait training, Stair training, Balance training, Strengthening, Endurance training, Therapeutic exercise, Therapeutic activity, Neuromuscular re-education  PT Plan: Ongoing PT  PT Frequency: 5 times per week (6x prn)  PT Discharge Recommendations:  (Anticipate discharge home with the need for intermittent supervision and assist with higher level mobility tasks and due to impulsivity and decreased safety awareness.)  Equipment Recommended upon Discharge: Wheeled walker  PT Recommended Transfer Status: Assist x1      General Visit Information:   PT  Visit  PT Received On: 10/22/24  General  Patient Position Received: Up in chair, Alarm on  General Comment: patient pleasant and cooperative; motivated to participate in therapy    Subjective   Precautions:  Precautions  Medical Precautions: Fall precautions        Objective   Pain:  Pain Assessment  0-10 (Numeric) Pain Score: 0 - No pain  Cognition:  Cognition  Insight: Mild  Impulsive: Moderately     Treatments:  Balance/Neuromuscular Re-Education  Balance/Neuromuscular Re-Education Activity Performed:  (performed standing marches x20 reps with hands on wall, amb'd throughout department while pushing weighted cart, performed amb throughout gym wihtout AD while alternately kicking can requiring up to max assist to maintain/correct balance.)    Ambulation/Gait Training  Ambulation/Gait Training Performed:  (gait training 100' x2 with ww, shoes on and min assist; 1 episode of scissoring while turning corner.)    Transfers  Transfer:  (transfer training sit/stand with  CGA.)    Stairs  Stairs:  (up/down 5 steps x2 with 2 rails, reciprocal pattern and mod assist.)    Education Documentation  Patient educated in safe techniques for gait with a device in order to maximize safety and functional independence.    Patient educated in safe and proper transfer techniques including proper positioning and hand/foot placement to maximize safety and functional independence.    Patient educated in stair training techniques including proper sequencing, hand advancement on rails and safe foot placement to maximize functional independence.          Encounter Problems       Encounter Problems (Active)       PT Problem       STG - Bed mobility with supervision (Progressing)       Start:  10/19/24    Expected End:  10/24/24            STG - Transfers with CGA (Progressing)       Start:  10/19/24    Expected End:  10/24/24            STG - Pt will amb 100 ft with wheeled walker and min/mod assist x1. (Progressing)       Start:  10/19/24    Expected End:  10/24/24            STG - Pt will negotiate 2 stairs with rails and min assist x2. (Progressing)       Start:  10/19/24    Expected End:  10/24/24            LTG - Mod independent bed mobility. (Progressing)       Start:  10/19/24    Expected End:  10/29/24            LTG - Mod independent transfers (Progressing)       Start:  10/19/24    Expected End:  10/29/24            LTG - Pt will amb 150 ft with wheeled walker and SBA (Progressing)       Start:  10/19/24    Expected End:  10/29/24            LTG - Pt will negotiate 5 stairs with rails and min assist x1. (Progressing)       Start:  10/19/24    Expected End:  10/29/24               Pain - Adult

## 2024-10-23 PROCEDURE — 2500000002 HC RX 250 W HCPCS SELF ADMINISTERED DRUGS (ALT 637 FOR MEDICARE OP, ALT 636 FOR OP/ED)

## 2024-10-23 PROCEDURE — 97110 THERAPEUTIC EXERCISES: CPT | Mod: GP,CQ

## 2024-10-23 PROCEDURE — 97116 GAIT TRAINING THERAPY: CPT | Mod: GP,CQ

## 2024-10-23 PROCEDURE — 2500000001 HC RX 250 WO HCPCS SELF ADMINISTERED DRUGS (ALT 637 FOR MEDICARE OP)

## 2024-10-23 PROCEDURE — 97530 THERAPEUTIC ACTIVITIES: CPT | Mod: GO

## 2024-10-23 PROCEDURE — 2500000004 HC RX 250 GENERAL PHARMACY W/ HCPCS (ALT 636 FOR OP/ED)

## 2024-10-23 PROCEDURE — 97112 NEUROMUSCULAR REEDUCATION: CPT | Mod: GO

## 2024-10-23 PROCEDURE — 1180000001 HC REHAB PRIVATE ROOM DAILY

## 2024-10-23 PROCEDURE — 99232 SBSQ HOSP IP/OBS MODERATE 35: CPT

## 2024-10-23 PROCEDURE — 97535 SELF CARE MNGMENT TRAINING: CPT | Mod: GO

## 2024-10-23 PROCEDURE — 97112 NEUROMUSCULAR REEDUCATION: CPT | Mod: GP,CQ

## 2024-10-23 PROCEDURE — 2500000004 HC RX 250 GENERAL PHARMACY W/ HCPCS (ALT 636 FOR OP/ED): Performed by: PHYSICAL MEDICINE & REHABILITATION

## 2024-10-23 RX ORDER — TIZANIDINE 4 MG/1
4 TABLET ORAL EVERY 6 HOURS PRN
Status: DISCONTINUED | OUTPATIENT
Start: 2024-10-23 | End: 2024-10-30 | Stop reason: HOSPADM

## 2024-10-23 RX ORDER — ENOXAPARIN SODIUM 100 MG/ML
40 INJECTION SUBCUTANEOUS EVERY 24 HOURS
Status: DISCONTINUED | OUTPATIENT
Start: 2024-10-23 | End: 2024-10-25

## 2024-10-23 ASSESSMENT — PAIN - FUNCTIONAL ASSESSMENT
PAIN_FUNCTIONAL_ASSESSMENT: 0-10

## 2024-10-23 ASSESSMENT — ACTIVITIES OF DAILY LIVING (ADL)
BATHING_LEVEL_OF_ASSISTANCE: CLOSE SUPERVISION
BATHING_EQUIPMENT_NEEDED: LONG-HANDLED SPONGE
BATHING_WHERE_ASSESSED: SHOWER
HOME_MANAGEMENT_TIME_ENTRY: 30

## 2024-10-23 ASSESSMENT — PAIN SCALES - GENERAL
PAINLEVEL_OUTOF10: 0 - NO PAIN
PAINLEVEL_OUTOF10: 0 - NO PAIN
PAINLEVEL_OUTOF10: 5 - MODERATE PAIN
PAINLEVEL_OUTOF10: 0 - NO PAIN

## 2024-10-23 NOTE — PROGRESS NOTES
Physical Therapy    Physical Therapy Treatment    Patient Name: Jahaira Moore  MRN: [id number]  Department: [facility] REH[facility]  Room: [address] [place] [address]  Today's Date: 10/23/2024  Time Calculation  Start Time: 0915  Stop Time: 1000  Time Calculation (min): 45 min         Assessment/Plan   PT Assessment  End of Session Patient Position: Alarm on, Up in chair     PT Plan  Treatment/Interventions: Bed mobility, Transfer training, Gait training, Stair training, Balance training, Strengthening, Endurance training, Therapeutic exercise, Therapeutic activity, Neuromuscular re-education  PT Plan: Ongoing PT  PT Frequency: 5 times per week (6x prn)  PT Discharge Recommendations:  (Anticipate discharge home with the need for intermittent supervision and assist with higher level mobility tasks and due to impulsivity and decreased safety awareness.)  Equipment Recommended upon Discharge: Wheeled walker  PT Recommended Transfer Status: Assist x1      General Visit Information:   PT  Visit  PT Received On: 10/23/24  General  Prior to Session Communication:  (handoff from OT)  Patient Position Received: Up in chair, Alarm on  General Comment: patient pleasant, cooperative, and highly motivated    Subjective   Precautions:  Precautions  Medical Precautions: Fall precautions        Objective   Pain:  Pain Assessment  0-10 (Numeric) Pain Score: 8  Pain Type: Chronic pain  Pain Location: Back  Pain Interventions: Repositioned  Cognition:  Cognition  Insight: Mild  Impulsive: Moderately     Treatments:  Therapeutic Exercise  Therapeutic Exercise Performed:  (performed seated isometric trunk movements all planes x5 reps with 5 second hold.)    Balance/Neuromuscular Re-Education  Balance/Neuromuscular Re-Education Activity Performed:  (pt performed dual task activity of gait training with ww and head turns counting pictures on right and left with 100% accuracy but 2 episodes of LOB but able to self correct. Amb's with ww while alternately kicking ball;  unsteady.)    Ambulation/Gait Training  Ambulation/Gait Training Performed:  (gait training 150' and 100' with ww and CGA; cues to decrease stride length. Amb's 100' with hand held CGA with increased unsteadiness and ataxia more pronounced.)    Transfers  Transfer:  (transfer training sit/stand with CGA/SBA.)      Education Documentation  Patient educated in safe techniques for gait with a device in order to maximize safety and functional independence.    Patient educated in safe and proper transfer techniques including proper positioning and hand/foot placement to maximize safety and functional independence.         Encounter Problems       Encounter Problems (Active)       PT Problem       STG - Bed mobility with supervision (Met)       Start:  10/19/24    Expected End:  10/24/24    Resolved:  10/23/24         STG - Transfers with CGA (Progressing)       Start:  10/19/24    Expected End:  10/24/24            STG - Pt will amb 100 ft with wheeled walker and min/mod assist x1. (Met)       Start:  10/19/24    Expected End:  10/24/24    Resolved:  10/23/24         STG - Pt will negotiate 2 stairs with rails and min assist x2. (Met)       Start:  10/19/24    Expected End:  10/24/24    Resolved:  10/23/24         LTG - Mod independent bed mobility. (Progressing)       Start:  10/19/24    Expected End:  10/29/24            LTG - Mod independent transfers (Progressing)       Start:  10/19/24    Expected End:  10/29/24            LTG - Pt will amb 150 ft with wheeled walker and SBA (Progressing)       Start:  10/19/24    Expected End:  10/29/24            LTG - Pt will negotiate 5 stairs with rails and min assist x1. (Progressing)       Start:  10/19/24    Expected End:  10/29/24               Pain - Adult

## 2024-10-23 NOTE — PROGRESS NOTES
Occupational Therapy    OT Treatment    Patient Name: Jahaira Moore  MRN: [id number]  Department: [facility] REHAB  Room: [address] [place] [address]  Today's Date: 10/23/2024  Time Calculation  Start Time: 0830  Stop Time: 0915  Time Calculation (min): 45 min    Lamar Cornell OTR/L supervised and approves Maura Osorio S/OT documentation and treatment of this patient.        Assessment:  End of Session Communication:  (Handoff to PT)  End of Session Patient Position: Alarm on, Up in chair    Plan:  Treatment Interventions: ADL retraining, Endurance training, Patient/family training, Neuromuscular reeducation, Functional transfer training  OT Frequency: 90 min/5 days per week (x 10 days)  OT Discharge Recommendations:  (anticipate need for initial supervision with ADLs, transfers, and mobility secondary to decreased safety awareness)  Equipment Recommended upon Discharge:  (3-in-1 commode, wheeled walker, tub seat, reacher)  Treatment Interventions: ADL retraining, Endurance training, Patient/family training, Neuromuscular reeducation, Functional transfer training    Subjective   Previous Visit Info:  OT Last Visit  OT Received On: 10/23/24  General:  General  Prior to Session Communication: Bedside nurse  Patient Position Received: Alarm on, Up in chair  General Comment:  (Patient pleasant and cooperative; demonstrates poor safety awarness)  Precautions:  Medical Precautions: Fall precautions    Pain:  Pain Assessment  Pain Assessment: 0-10  0-10 (Numeric) Pain Score: 4  Pain Type:  (Right Wrist)  Pain Interventions: Distraction, Ambulation/increased activity    Objective    Cognition:  Cognition  Insight: Mild  Impulsive: Moderately      Activities of Daily Living: Grooming  Grooming Level of Assistance: Setup  Grooming Where Assessed: Sitting sinkside    UE Bathing  UE Bathing Adaptive Equipment: Long-handled sponge, Removeable shower head  UE Bathing Level of Assistance: Close supervision  UE Bathing Where Assessed: Shower    LE  Bathing  LE Bathing Adaptive Equipment: Long-handled sponge  LE Bathing Level of Assistance: Close supervision  LE Bathing Where Assessed: Shower  LE Bathing Comments:  (completed pericare sitting and weightshifting on shower bench)    UE Dressing  UE Dressing Level of Assistance: Setup  UE Dressing Where Assessed:  (sitting in w/c)    LE Dressing  LE Dressing:  (Completed with CGA up to Min A for balance and hike over hips; patient demonstrates verbal cues for safety and pacing)    Toileting  Toileting Level of Assistance: Distant supervision for kerry care while seated on toilet; cga for pulling underwear up/down over hips while standing at walker   Where Assessed: Toilet    Bed Mobility/Transfers: Transfers  Transfer:  (Completes sit<>stand transfers from wheelchair with CGA to wheeled walker; requires up to Min assist during sit<>stand without wheeled walker; requires cues for pacing and safety for transfers)    Toilet Transfers  Toilet Transfers Comments: sit<>stand to wheeled walker from wheelchair with simple approach steps with CGA- Min assist for walker safety and stability    Functional Mobility:  Functional Mobility  Functional Mobility Performed:  (Completed functional mobility with wheeled walker with CGA-Min assist for manuvering and navigating around corners and environmental barriers; verbal cues for safety and positoning)    Therapy/Activity:   Completed dynamic balance activity incorporating catching and throwing balloon to improve functional task performance, safety, and mobility; required min assist with x 2 LOB with moderate assist to recover        EDUCATION:  Education  Individual(s) Educated: Patient  Education Provided:  (safety during functional mobility and transfers, walker safety, ADL training, adaptive strategies, safety training)  Patient Response to Education:  (patient will continue to benefit from further education)       Goals:  Encounter Problems       Encounter Problems (Active)        OT Problem       LTG - Patient will complete grooming with set up. (Progressing)       Start:  10/19/24    Expected End:  10/29/24            LTG - Patient will complete toileting with supervision. (Progressing)       Start:  10/19/24    Expected End:  10/29/24            LTG - Patient will complete bathing with supervision. (Progressing)       Start:  10/19/24    Expected End:  10/29/24            LTG - Patient will complete UE dressing with set up. (Progressing)       Start:  10/19/24    Expected End:  10/29/24            LTG - Patient will complete LE dressing using adaptive equip as needed with supervision. (Progressing)       Start:  10/19/24    Expected End:  10/29/24            LTG - Patient will complete commode transfer via device as needed with supervision. (Progressing)       Start:  10/19/24    Expected End:  10/29/24            LTG - Patient will complete tub/shower transfer using DME as needed with sba. (Progressing)       Start:  10/19/24    Expected End:  10/29/24            LTG - Patient will complete functional mobility via device as needed with s/sba. (Progressing)       Start:  10/19/24    Expected End:  10/29/24            LTG - Patient will complete light meal prep or kitchen access with sba. (Progressing)       Start:  10/19/24    Expected End:  10/29/24            STG - Patient will complete toileting with cga. (Progressing)       Start:  10/19/24    Expected End:  10/26/24            STG - Patient will complete bathing with sba. (Progressing)       Start:  10/19/24    Expected End:  10/26/24            STG - Patient will complete LE dressing using adaptive equip as needed with cga. (Progressing)       Start:  10/19/24    Expected End:  10/26/24            STG - Patient will complete commode transfer via device as needed with cga. (Progressing)       Start:  10/19/24    Expected End:  10/26/24            STG - Patient will complete tub/shower transfer using DME as needed with min assist.  (Progressing)       Start:  10/19/24    Expected End:  10/26/24            STG - Patient will complete functional mobility via device as needed with min assist. (Progressing)       Start:  10/19/24    Expected End:  10/26/24

## 2024-10-23 NOTE — PROGRESS NOTES
"Jahaira Moore is a 69 y.o. female on day 5 of admission presenting with Cerebrovascular accident (CVA) due to occlusion of right vertebral artery (Multi).    Subjective   The patient was seen in therapy.  She is eating and sleeping well.  She is tolerating therapy well but is a little discouraged because she has appropriate strength but it is still difficult to walk due to her dis coordination.  She continues to have pain in her right lumbar paraspinal muscles.  She is having regular BM.  States that Voltaren gel has worked well for her hands.       Objective     Physical Exam  Constitutional: Appears stated age. In NAD.   HEENT: NC/AT, EOMI, clear sclera, moist mucous membranes  CV: RRR, No M/R/G  PULM: CTAB, no coughing or wheezing  ABDOMEN: Soft, NT/ND. No TTP  SKIN: Normal Color, Warm, Dry, No Rashes   EXTREMITIES: Non-Tender, Full ROM, No Pedal Edema  MSK: TTP right paraspinal muscles  NEURO: A&O x 4, slight ataxic movement of RLE with walking   PSYCH: Normal Mood & Behavior    Function: Min assist.    Last Recorded Vitals  Blood pressure 141/81, pulse 61, temperature 35.8 °C (96.4 °F), resp. rate 18, height 1.6 m (5' 3\"), weight 61.2 kg (135 lb), SpO2 95%.  Intake/Output last 3 Shifts:  No intake/output data recorded.    Relevant Results                             Assessment/Plan   Assessment & Plan  Cerebrovascular accident (CVA) due to occlusion of right vertebral artery (Multi)    1.  CVA  Aspirin 81 mg daily  Lipitor 40 mg daily  Plavix 75 mg daily  Begin physical therapy for  transfers, bed mobility, lower extremity strengthening and coordination, gait training with an assistive device  Begin occupational therapy for ADL retraining and ADL activities, transfers, exercises to improve her coordination     2.  Hyperlipidemia  Lipitor 40 mg daily     3.  Rheumatoid arthritis   prednisone 6 mg and 1 mg daily     4.  History of depression  Wellbutrin 300 mg monitor for adverse mood changes  Cymbalta 90 mg " daily  Trazodone 50 mg at at bedtime  Neurontin 300 mg 3 times a day     5.  DVT prophylaxis  Lovenox 40 mg every 24 hours     6.  GI protectant  Protonix 40 mg daily     7.  Elevated BUN to creatinine  Replete blood work, encourage fluids     8. LILLIAM  She is on DVT prophylaxis with Lovenox  She is on GI protectant with Protonix  She is constipated and is a bowel program has been started  She is a fall risk fall precautions are being utilized    10/23  -Patient continues to make improvements with ADLS and use of FWW.  -Tizanidine ordered for low back pain  -discussed difficulty of stroke recovery, will watch for adjustment disorder.  -re-counseled increase fluid consumption for MONIQUE.  -continue Asprin, Plavix and atorvastatin for stroke ppx.         I spent 35 minutes in the professional and overall care of this patient.       Sebastian Greene, DO  PM&R  PGY-2

## 2024-10-23 NOTE — PROGRESS NOTES
Occupational Therapy    OT Treatment    Patient Name: Jahaira Moore  MRN: [id number]  Department: [facility] REHAB  Room: [address] [place] [address]  Today's Date: 10/23/2024  Time Calculation  Start Time: 1045  Stop Time: 1130  Time Calculation (min): 45 min    Lamar Cornell OTR/L supervised and approves Maura Osorio S/OT documentation and treatment of this patient.         Assessment:  End of Session Communication: Bedside nurse  End of Session Patient Position: Up in chair, Alarm on     Plan:  Treatment Interventions: ADL retraining, Endurance training, Patient/family training, Neuromuscular reeducation, Functional transfer training  OT Frequency: 90 min/5 days per week (x 10 days)  OT Discharge Recommendations:  (anticipate need for initial supervision with ADLs, transfers, and mobility secondary to decreased safety awareness)  Equipment Recommended upon Discharge:  (3-in-1 commode, wheeled walker, tub seat, reacher)  Treatment Interventions: ADL retraining, Endurance training, Patient/family training, Neuromuscular reeducation, Functional transfer training    Subjective   Previous Visit Info:  OT Last Visit  OT Received On: 10/23/24  General:  General  Prior to Session Communication: Bedside nurse  Patient Position Received: Alarm on, Up in chair  General Comment:  (Patient pleasant and cooperative; demonstrates poor safety awarness and impulsive behaviors)  Precautions:  Medical Precautions: Fall precautions    Pain:  Pain Assessment  Pain Assessment: 0-10  0-10 (Numeric) Pain Score: 5 - Moderate pain  Pain Type:  (Right Wrist)  Pain Interventions: Distraction, Ambulation/increased activity    Objective    Cognition:  Cognition  Insight: Mild  Impulsive: Moderately      Bed Mobility/Transfers: Transfers  Transfer:  (Completes sit<>stand transfers from wheelchair with CGA to wheeled walker; requires up to Min assist during sit<>stand without wheeled walker; stand pivot from wheelchair to recliner with NAD with Min assist)  Transfer  1  Transfer From 1:  (requires cues for pacing and safety for transfers)  Transfer to 1:  (Noted sit<>stand transfer with impulsive behaviors thorughout session; severe LOB towards involved right side during the transitional phase of upright stance with maximal assist to recover;)      Functional Mobility:  Functional Mobility  Functional Mobility Performed:  (Completed functional mobility with wheeled walker with CGA-Min assist for manuvering and navigating around corners and environmental barriers; verbal cues for safety and positoning)      Therapy/Activity: Therapeutic Activity  Therapeutic Activity Performed:  (Item retrival via wheeled walker and reacher across varying surface heights to facilitate bending and reaching with up to Min assist for positoining and postural control.)    Balance/Neuromuscular Re-Education  Balance/Neuromuscular Re-Education Activity Performed:  (Peg board design to facilitate improvement with fine motor coordination and control, no difficulty manipulating pegs with right hand; moderate difficult design with 100% accuracy)    Balance/Neuromuscular Re-Education Activity 1:  (dynamic standing, bending and reaching using wheeled walker and reacher; required increased time and x3 LOB with moderate assist to recover; LUE used for unitlateral support on wheeled walker RUE for reacher and fine motor control)    Balance/Neuromuscular Re-Education Activity 2:  (motor skill activity in standing unsupported; alternating kicking cones to facilitate coordination and balance required for safe mobility and ADL performance, requires moderate-maximal assist to maintain balance and postural stability); patient had moderate difficulty with alteranting BLEs during activity; feet positioning, and stability      OP EDUCATION:  Education  Individual(s) Educated: Patient  Education Provided:  (safety training during functional mobility and transfers, safety awareness techniques, compensatory strategies,  adaptive techniques, adaptive equipment training)  Education Comment:  (Patient to benefit from continued education)    Goals:  Encounter Problems       Encounter Problems (Active)       OT Problem       LTG - Patient will complete grooming with set up. (Progressing)       Start:  10/19/24    Expected End:  10/29/24            LTG - Patient will complete toileting with supervision. (Progressing)       Start:  10/19/24    Expected End:  10/29/24            LTG - Patient will complete bathing with supervision. (Progressing)       Start:  10/19/24    Expected End:  10/29/24            LTG - Patient will complete UE dressing with set up. (Progressing)       Start:  10/19/24    Expected End:  10/29/24            LTG - Patient will complete LE dressing using adaptive equip as needed with supervision. (Progressing)       Start:  10/19/24    Expected End:  10/29/24            LTG - Patient will complete commode transfer via device as needed with supervision. (Progressing)       Start:  10/19/24    Expected End:  10/29/24            LTG - Patient will complete tub/shower transfer using DME as needed with sba. (Progressing)       Start:  10/19/24    Expected End:  10/29/24            LTG - Patient will complete functional mobility via device as needed with s/sba. (Progressing)       Start:  10/19/24    Expected End:  10/29/24            LTG - Patient will complete light meal prep or kitchen access with sba. (Progressing)       Start:  10/19/24    Expected End:  10/29/24            STG - Patient will complete toileting with cga. (Progressing)       Start:  10/19/24    Expected End:  10/26/24            STG - Patient will complete bathing with sba. (Progressing)       Start:  10/19/24    Expected End:  10/26/24            STG - Patient will complete LE dressing using adaptive equip as needed with cga. (Progressing)       Start:  10/19/24    Expected End:  10/26/24            STG - Patient will complete commode transfer via  device as needed with cga. (Progressing)       Start:  10/19/24    Expected End:  10/26/24            STG - Patient will complete tub/shower transfer using DME as needed with min assist. (Progressing)       Start:  10/19/24    Expected End:  10/26/24            STG - Patient will complete functional mobility via device as needed with min assist. (Progressing)       Start:  10/19/24    Expected End:  10/26/24

## 2024-10-23 NOTE — PROGRESS NOTES
Physical Therapy    Physical Therapy Treatment    Patient Name: Jahaira Moore  MRN: [id number]  Department: [facility] REHAB  Room: [address] [place] [address]  Today's Date: 10/23/2024  Time Calculation  Start Time: 1300  Stop Time: 1345  Time Calculation (min): 45 min         Assessment/Plan   PT Assessment  End of Session Communication:  (rehab aide)  End of Session Patient Position: Up in chair, Alarm on (mobilizing self in WC with B LE. Sister and rehab aide present.)     PT Plan  Treatment/Interventions: Bed mobility, Transfer training, Gait training, Stair training, Balance training, Strengthening, Endurance training, Therapeutic exercise, Therapeutic activity, Neuromuscular re-education  PT Plan: Ongoing PT  PT Frequency: 5 times per week (6x prn)  PT Discharge Recommendations:  (Anticipate discharge home with the need for intermittent supervision and assist with higher level mobility tasks and due to impulsivity and decreased safety awareness.)  Equipment Recommended upon Discharge: Wheeled walker  PT Recommended Transfer Status: Assist x1      General Visit Information:   PT  Visit  PT Received On: 10/23/24  General  Family/Caregiver Present: Yes (sister)  Prior to Session Communication: Bedside nurse  Patient Position Received: Alarm on, Up in chair  General Comment: Patient pleasant and fully participatory in therapy.    Subjective   Precautions:  Precautions  Medical Precautions: Fall precautions    Objective   Pain:  Pain Assessment  Pain Assessment: 0-10  0-10 (Numeric) Pain Score: 0 - No pain  Cognition:  Cognition  Overall Cognitive Status: Within Functional Limits  Treatments:  Therapeutic Exercise  Therapeutic Exercise Performed: Yes  Patient performed standing therex at //bar, B LE: heel raises, hip flex, knee flex, hip abd, hip ext, and mini squats all [id number]. X1 seated rest to manage fatigue. Done to improve B LE strength and endurance in standing as well as balance while performing single leg task.     Therapeutic  Activity  Therapeutic Activity Performed: Yes  Patient performed side stepping around gym mat x2 trials (towards R and L) with Mod/Deb x1 via HHA. Cues for body awareness, LE positioning, and balance. Seated rest between changing directions. Done to improve strength in hip abductors and balance during transitional movements with ambulation.     Balance/Neuromuscular Re-Education  Balance/Neuromuscular Re-Education Activity Performed: Yes  Patient performed side stepping over agility ladder with cues for WS, hip flexion, and hip abduction. Required initial Deb however, increased to ModA x1; via HHA. Patient only able to complete x1 trial d/t increased fatigue level and difficulty maintaining balance.     Ambulation/Gait Training  Ambulation/Gait Training Performed: Yes  Patient ambulated ~100ft x2 trials with FWW and CGA x1. Cues for increased stride length, adequate LAURA, and walker management.     Transfers  Transfer: Yes  Patient performed sit<>stand transfer with CGA x1. Cues for sequencing and balance as patient demo's mild impulsivity.       Education Comments  Educated patient on benefits of utilizing rest breaks to manage muscle fatigue and decreased fall risk. Patient receptive and verbalized understanding.         OP EDUCATION:       Encounter Problems       Encounter Problems (Active)       PT Problem       STG - Bed mobility with supervision (Met)       Start:  10/19/24    Expected End:  10/24/24    Resolved:  10/23/24         STG - Transfers with CGA (Progressing)       Start:  10/19/24    Expected End:  10/24/24            STG - Pt will amb 100 ft with wheeled walker and min/mod assist x1. (Met)       Start:  10/19/24    Expected End:  10/24/24    Resolved:  10/23/24         STG - Pt will negotiate 2 stairs with rails and min assist x2. (Met)       Start:  10/19/24    Expected End:  10/24/24    Resolved:  10/23/24         LTG - Mod independent bed mobility. (Progressing)       Start:  10/19/24     Expected End:  10/29/24            LTG - Mod independent transfers (Progressing)       Start:  10/19/24    Expected End:  10/29/24            LTG - Pt will amb 150 ft with wheeled walker and SBA (Progressing)       Start:  10/19/24    Expected End:  10/29/24            LTG - Pt will negotiate 5 stairs with rails and min assist x1. (Progressing)       Start:  10/19/24    Expected End:  10/29/24               Pain - Adult

## 2024-10-23 NOTE — CARE PLAN
The patient's goals for the shift include      The clinical goals for the shift include pt will remain HD stable

## 2024-10-24 PROBLEM — R27.0 ATAXIA: Status: ACTIVE | Noted: 2024-10-24

## 2024-10-24 PROCEDURE — 97530 THERAPEUTIC ACTIVITIES: CPT | Mod: GP,CQ

## 2024-10-24 PROCEDURE — 2500000002 HC RX 250 W HCPCS SELF ADMINISTERED DRUGS (ALT 637 FOR MEDICARE OP, ALT 636 FOR OP/ED)

## 2024-10-24 PROCEDURE — 1180000001 HC REHAB PRIVATE ROOM DAILY

## 2024-10-24 PROCEDURE — 97530 THERAPEUTIC ACTIVITIES: CPT | Mod: GP

## 2024-10-24 PROCEDURE — 97116 GAIT TRAINING THERAPY: CPT | Mod: GP,CQ

## 2024-10-24 PROCEDURE — 97112 NEUROMUSCULAR REEDUCATION: CPT | Mod: GP,CQ

## 2024-10-24 PROCEDURE — 99232 SBSQ HOSP IP/OBS MODERATE 35: CPT | Performed by: PHYSICAL MEDICINE & REHABILITATION

## 2024-10-24 PROCEDURE — 97530 THERAPEUTIC ACTIVITIES: CPT | Mod: GO

## 2024-10-24 PROCEDURE — 97112 NEUROMUSCULAR REEDUCATION: CPT | Mod: GO

## 2024-10-24 PROCEDURE — 97116 GAIT TRAINING THERAPY: CPT | Mod: GP

## 2024-10-24 PROCEDURE — 2500000004 HC RX 250 GENERAL PHARMACY W/ HCPCS (ALT 636 FOR OP/ED): Performed by: PHYSICAL MEDICINE & REHABILITATION

## 2024-10-24 PROCEDURE — 2500000001 HC RX 250 WO HCPCS SELF ADMINISTERED DRUGS (ALT 637 FOR MEDICARE OP)

## 2024-10-24 PROCEDURE — 2500000004 HC RX 250 GENERAL PHARMACY W/ HCPCS (ALT 636 FOR OP/ED)

## 2024-10-24 ASSESSMENT — PAIN SCALES - GENERAL
PAINLEVEL_OUTOF10: 4
PAINLEVEL_OUTOF10: 3
PAINLEVEL_OUTOF10: 3
PAINLEVEL_OUTOF10: 0 - NO PAIN
PAINLEVEL_OUTOF10: 4
PAINLEVEL_OUTOF10: 7
PAINLEVEL_OUTOF10: 5 - MODERATE PAIN

## 2024-10-24 ASSESSMENT — PAIN - FUNCTIONAL ASSESSMENT
PAIN_FUNCTIONAL_ASSESSMENT: 0-10

## 2024-10-24 ASSESSMENT — PAIN SCALES - WONG BAKER: WONGBAKER_NUMERICALRESPONSE: HURTS EVEN MORE

## 2024-10-24 ASSESSMENT — PAIN DESCRIPTION - LOCATION: LOCATION: WRIST

## 2024-10-24 ASSESSMENT — PAIN DESCRIPTION - DESCRIPTORS: DESCRIPTORS: DISCOMFORT

## 2024-10-24 NOTE — CARE PLAN
The patient's goals for the shift include  comfort    The clinical goals for the shift include safety

## 2024-10-24 NOTE — PROGRESS NOTES
Physical Therapy    Physical Therapy Treatment    Patient Name: Jahaira Moore  MRN: [id number]  Department: [facility] REHAB  Room: [address] [place] [address]  Today's Date: 10/24/2024  Time Calculation  Start Time: 1300  Stop Time: 1350  Time Calculation (min): 50 min         Assessment/Plan   PT Assessment  End of Session Patient Position: Up in bathroom (call light in reach, pt verbalizes understanding to call for assist when finished.)     PT Plan  Treatment/Interventions: Bed mobility, Transfer training, Gait training, Stair training, Balance training, Strengthening, Endurance training, Therapeutic exercise, Therapeutic activity, Neuromuscular re-education  PT Plan: Ongoing PT  PT Frequency: 5 times per week (6x prn)  PT Discharge Recommendations:  (Anticipate discharge home with the need for intermittent supervision and assist with higher level mobility tasks and due to impulsivity and decreased safety awareness.)  Equipment Recommended upon Discharge: Wheeled walker  PT Recommended Transfer Status: Assist x1      General Visit Information:   PT  Visit  PT Received On: 10/24/24  General  Patient Position Received: Up in chair, Alarm on  General Comment: A good portion of session spent discussing home going safety with patient and PTA.  Discussed possible w/c level at home for increased independence.  Pt reluctant at first but then agreeable. Practiced w/c mobility and set up for transfers.  Upon further discussion pt continues to refer to using walker at home despite lengthy discussion and practice.  Will need continued education.    Subjective   Precautions:  Precautions  Medical Precautions: Fall precautions      Objective   Pain:  Pain Assessment  Pain Assessment: 0-10  0-10 (Numeric) Pain Score: 0 - No pain    Treatments:  Ambulation/Gait Training  Ambulation/Gait Training Performed:  (Pt ambulates 100 ft x 2, 100 ft x 2 with FWW and CGA.  Pt L foot kicking walker at times.  BLE crossing midline throughout ambulation especially with  turns.  RLE crossing midline more than LLE.)  Transfers  Transfer:  (Pt performs stand pivot transfers x 6.  Initially pt performs stand pivot with CGA, pivots toward R side though w/c set up for L pivot.  After education pt able to perform pivots appropriately.)  Transfer 1  Transfer From 1:  (Pt performs stand pivot transfer from w/c to mat table toward L, mat table to w/c toward R, w/c to couch over carpet toward L and couch to w/c toward L all with SBA.)  Transfer to 1:  (Pt performs sit/stand transfers with SBA/supervision.)    Wheelchair Activities  Propulsion:  (Pt propels w/c 150 ft over tile, carpet and thresholds with BLE with mod I. Pt able to set up w/c appropriately for pivots and locks brakes without cues.)      Education Documentation  Pt educated on techniques for transfers and gait training with device in order to maximize safety and independence.    Encounter Problems       Encounter Problems (Active)       PT Problem       STG - Bed mobility with supervision (Met)       Start:  10/19/24    Expected End:  10/24/24    Resolved:  10/23/24         STG - Transfers with CGA (Progressing)       Start:  10/19/24    Expected End:  10/24/24            STG - Pt will amb 100 ft with wheeled walker and min/mod assist x1. (Met)       Start:  10/19/24    Expected End:  10/24/24    Resolved:  10/23/24         STG - Pt will negotiate 2 stairs with rails and min assist x2. (Met)       Start:  10/19/24    Expected End:  10/24/24    Resolved:  10/23/24         LTG - Mod independent bed mobility. (Progressing)       Start:  10/19/24    Expected End:  10/29/24            LTG - Mod independent transfers (Progressing)       Start:  10/19/24    Expected End:  10/29/24            LTG - Pt will amb 150 ft with wheeled walker and SBA (Progressing)       Start:  10/19/24    Expected End:  10/29/24            LTG - Pt will negotiate 5 stairs with rails and min assist x1. (Progressing)       Start:  10/19/24    Expected End:   10/29/24

## 2024-10-24 NOTE — PROGRESS NOTES
Occupational Therapy    OT Treatment    Patient Name: Jahaira Moore  MRN: [id number]  Department: [facility] REHAB  Room: [address] [place], [place] [address]  Today's Date: 10/24/2024  Time Calculation  Start Time: 1045  Stop Time: 1130  Time Calculation (min): 45 min      Chichi De Souza OTR/L supervised and approves Maura Osorio S/OT documentation and treatment of this patient.   Assessment:  End of Session Communication:  (Handoff to PT)  End of Session Patient Position: Alarm on, Up in chair     Plan:  Treatment Interventions: ADL retraining, Endurance training, Patient/family training, Neuromuscular reeducation, Functional transfer training  OT Frequency: 90 min/5 days per week (x 10 days)  OT Discharge Recommendations:  (anticipate need for initial supervision with ADLs, transfers, and mobility secondary to decreased safety awareness)  Equipment Recommended upon Discharge:  (3-in-1 commode, wheeled walker, tub seat, reacher)  Treatment Interventions: ADL retraining, Endurance training, Patient/family training, Neuromuscular reeducation, Functional transfer training    Subjective   Previous Visit Info:  OT Last Visit  OT Received On: 10/24/24  General:  General  Prior to Session Communication: Bedside nurse  Patient Position Received: Up in chair, Alarm on  General Comment:  (Discussed home safety and reccomendations for going home. Patient continues to require moderate cueing for safety during mobility and higher level ADLs.)  Precautions:  Medical Precautions: Fall precautions      Pain:  Pain Assessment  Pain Assessment: 0-10  0-10 (Numeric) Pain Score: 4  Pain Type: Chronic pain  Pain Location: Back  Pain Interventions: Distraction, Repositioned    Objective    Cognition:  Cognition  Insight: Mild    Bed Mobility/Transfers: Toilet Transfers  Toilet Transfers Comments:  (commode transfers with CGA; occasional min assist for body positoining, walker safety, and hand placement), educated on safe toilet transfers for home- pt reports 1st floor  toilet in a closet with no light, recommendations for safety at night d/t pt states usually walks in the dark; also recommend hand placement for safety- reaching back for seat even though pt declining dme for toilets  Tub Transfers  Tub Transfers Comments:  (Completed tub chair transfers in bathroom using sit and turn method with CGA and use of tub mounted grab bar); tub on 2nd floor, will sponge bathe until able to access 2nd floor safely      Functional Mobility:  Functional Mobility  Functional Mobility Performed:  (Completed simple mobility including approach steps via wheeled walker, navigating and maneuvering tight corners with CGA-Min assist)      Therapy/Activity: Therapeutic Activity  Therapeutic Activity Performed:  (Pet care task performed with min-mod difficulty for sequencing and safety; educated and facilitated safe strategy to complete pet tasks: scooping, feeding, reaching, bending at seated level to facilitate independence and balance)    Therapeutic Activity 1: Item retrieval with laundry using wheeled walker, reacher, and walker tray with CGA for reaching at waist level      Therapeutic Activity 2:  (Completed buttoning (small buttons) on UE blouse to improve independence; patient minimal difficulty for initial button; consecutive buttons no difficulty)          OP EDUCATION:  Education  Individual(s) Educated: Patient  Education Provided: home setup reccomendations, safety training during functional mobility and transfers, walker safety, adaptive strategies, adaptive techniques, adaptive equipment training   Patient Response to Education:  (patient will continue to benefit from further education)       Goals:  Encounter Problems       Encounter Problems (Active)       OT Problem       LTG - Patient will complete grooming with set up. (Met)       Start:  10/19/24    Expected End:  10/29/24    Resolved:  10/23/24         LTG - Patient will complete toileting with supervision. (Progressing)        Start:  10/19/24    Expected End:  10/29/24            LTG - Patient will complete bathing with supervision. (Met)       Start:  10/19/24    Expected End:  10/29/24    Resolved:  10/23/24         LTG - Patient will complete UE dressing with set up. (Met)       Start:  10/19/24    Expected End:  10/29/24    Resolved:  10/23/24         LTG - Patient will complete LE dressing using adaptive equip as needed with supervision. (Progressing)       Start:  10/19/24    Expected End:  10/29/24            LTG - Patient will complete commode transfer via device as needed with supervision. (Progressing)       Start:  10/19/24    Expected End:  10/29/24            LTG - Patient will complete tub/shower transfer using DME as needed with sba. (Progressing)       Start:  10/19/24    Expected End:  10/29/24            LTG - Patient will complete functional mobility via device as needed with s/sba. (Progressing)       Start:  10/19/24    Expected End:  10/29/24            LTG - Patient will complete light meal prep or kitchen access with sba. (Progressing)       Start:  10/19/24    Expected End:  10/29/24            STG - Patient will complete toileting with cga. (Met)       Start:  10/19/24    Expected End:  10/26/24    Resolved:  10/23/24         STG - Patient will complete bathing with sba. (Met)       Start:  10/19/24    Expected End:  10/26/24    Resolved:  10/23/24         STG - Patient will complete LE dressing using adaptive equip as needed with cga. (Met)       Start:  10/19/24    Expected End:  10/26/24    Resolved:  10/23/24         STG - Patient will complete commode transfer via device as needed with cga. (Met)       Start:  10/19/24    Expected End:  10/26/24    Resolved:  10/23/24         STG - Patient will complete tub/shower transfer using DME as needed with min assist. (Met)       Start:  10/19/24    Expected End:  10/26/24    Resolved:  10/23/24         STG - Patient will complete functional mobility via device as  needed with min assist. (Met)       Start:  10/19/24    Expected End:  10/26/24    Resolved:  10/23/24

## 2024-10-24 NOTE — PROGRESS NOTES
Physical Therapy    Physical Therapy Treatment    Patient Name: Jahaira Moore  MRN: [id number]  Department: [facility] REHAB  Room: [address] [place] [address]  Today's Date: 10/24/2024  Time Calculation  Start Time: 0915  Stop Time: 1000  Time Calculation (min): 45 min         Assessment/Plan   PT Assessment  End of Session Patient Position: Up in chair, Alarm on     PT Plan  Treatment/Interventions: Bed mobility, Transfer training, Gait training, Stair training, Balance training, Strengthening, Endurance training, Therapeutic exercise, Therapeutic activity, Neuromuscular re-education  PT Plan: Ongoing PT  PT Frequency: 5 times per week (6x prn)  PT Discharge Recommendations:  (Anticipate discharge home with the need for intermittent supervision and assist with higher level mobility tasks and due to impulsivity and decreased safety awareness.)  Equipment Recommended upon Discharge: Wheeled walker  PT Recommended Transfer Status: Assist x1      General Visit Information:   PT  Visit  PT Received On: 10/24/24  General  Patient Position Received: Up in chair, Alarm on  General Comment: pleasant and cooperative    Subjective   Precautions:  Precautions  Medical Precautions: Fall precautions        Objective   Pain:  Pain Assessment  Pain Type: Chronic pain  Pain Location: Back  Pain Interventions: Medication (See MAR), Repositioned  Cognition:  Cognition  Insight: Mild  Impulsive: Moderately (improving)     Treatments:       Therapeutic Activity  Therapeutic Activity Performed:  (pt performed bed making, opening/closing dresser drawer top to bottom shile supporting herself safely with 1 hand and)    Balance/Neuromuscular Re-Education  Balance/Neuromuscular Re-Education Activity Performed:  (pt performed activities in ADL room without ww and with LOB x2 requiring assist to prevent falling. Performed forward, backward and sideways amb in parallel bars wihtout UE support; unsteady with several stops to correct balance. Weighted vest applied)    Bed  Mobility  Bed Mobility:  (bed mobility on ADL bed sit<>supine and rolling R/L indep.)    Ambulation/Gait Training  Ambulation/Gait Training Performed:  (gait training 150' with ww and SBA; continues to exhibit occ scissoring and lean to right. Amb's 10' with ww and modified indep. Amb's without ww with CGA.)    Transfers  Transfer:  (transfer training sit/stand off different surfaces with supervision.)      Education Documentation  Patient educated in safe techniques for gait with a device in order to maximize safety and functional independence.    Patient educated in safe and proper transfer techniques including proper positioning and hand/foot placement to maximize safety and functional independence.    Patient educated in correct bed mobility with instruction for proper trunk positioning, upper and lower body placement and positioning.         Encounter Problems       Encounter Problems (Active)       PT Problem       STG - Bed mobility with supervision (Met)       Start:  10/19/24    Expected End:  10/24/24    Resolved:  10/23/24         STG - Transfers with CGA (Progressing)       Start:  10/19/24    Expected End:  10/24/24            STG - Pt will amb 100 ft with wheeled walker and min/mod assist x1. (Met)       Start:  10/19/24    Expected End:  10/24/24    Resolved:  10/23/24         STG - Pt will negotiate 2 stairs with rails and min assist x2. (Met)       Start:  10/19/24    Expected End:  10/24/24    Resolved:  10/23/24         LTG - Mod independent bed mobility. (Progressing)       Start:  10/19/24    Expected End:  10/29/24            LTG - Mod independent transfers (Progressing)       Start:  10/19/24    Expected End:  10/29/24            LTG - Pt will amb 150 ft with wheeled walker and SBA (Progressing)       Start:  10/19/24    Expected End:  10/29/24            LTG - Pt will negotiate 5 stairs with rails and min assist x1. (Progressing)       Start:  10/19/24    Expected End:  10/29/24                Pain - Adult

## 2024-10-24 NOTE — PROGRESS NOTES
Occupational Therapy    OT Treatment    Patient Name: Jahaira Moore  MRN: [id number]  Department: [facility] REHAB  Room: [address] [place] [address]  Today's Date: 10/24/2024  Time Calculation  Start Time: 0830  Stop Time: 0915  Time Calculation (min): 45 min      Chichi De Souza OTR/L supervised and approves Maura Osorio S/OT documentation and treatment of this patient.   Assessment:  End of Session Communication:  (Handoff to PT)  End of Session Patient Position: Up in chair, Alarm on     Plan:  Treatment Interventions: ADL retraining, Endurance training, Patient/family training, Neuromuscular reeducation, Functional transfer training  OT Frequency: 90 min/5 days per week (x 10 days)  OT Discharge Recommendations:  (anticipate need for initial supervision with ADLs, transfers, and mobility secondary to decreased safety awareness)  Equipment Recommended upon Discharge:  (3-in-1 commode, wheeled walker, tub seat, reacher)  Treatment Interventions: ADL retraining, Endurance training, Patient/family training, Neuromuscular reeducation, Functional transfer training    Subjective   Previous Visit Info:  OT Last Visit  OT Received On: 10/24/24  General:  General  Prior to Session Communication: Bedside nurse  Patient Position Received: Up in chair, Alarm on  General Comment:  (Patient pleasant, cooperative, and motivated to return home)  Precautions:  Medical Precautions: Fall precautions            Pain:  Pain Assessment  Pain Assessment: 0-10  0-10 (Numeric) Pain Score: 4  Pain Type: Chronic pain  Pain Location: Back  Pain Interventions: Repositioned, Distraction    Objective    Cognition:  Cognition  Insight: Mild       Bed Mobility/Transfers: Transfers  Transfer:  (sit<>stand transfers from wheelchair and commode with SBA; stand-pivot transfer CGA and up to min assist for hand and foot placement, and body positioning)    Toilet Transfers  Toilet Transfers Comments:  (commode transfers with CGA; occasional min assist for body positoining, walker  safety, and hand placement)    Functional Mobility:  Functional Mobility  Functional Mobility Performed:  (Simple mobility and approach steps using wheeled walker with CGA and up to min assist to maneuver and navigate environmental barriers)       Therapy/Activity: Therapeutic Activity  Therapeutic Activity Performed:  (Item retrieval using reacher and wheeled walker across various levels CGA-Min assist; navigating and maneuvering in tight corners and spaces facilitating bending, reaching, and independence with ADLs)    Balance/Neuromuscular Re-Education  Balance/Neuromuscular Re-Education Activity Performed:  (motor skill activity in standing unsupported; alternating kicking cones to facilitate coordination and balance required for safe mobility and ADL performance, requires moderate-maximal assist up to x 2 to maintain balance and postural stability)  Balance/Neuromuscular Re-Education Activity 1: Patient stood with wheeled walker with unilateral support to complete balloon volley with CGA; minimal verbal cues for increasing base of support, and body positioning; patient demonstrated right lateral lean; facilitated righting reactions and safety required for mobility        OP EDUCATION:  Education  Individual(s) Educated: Patient  Education Provided:  (compensatory strategies, adaptive training, energy conservation safety training during functional mobility and functional transfers)  Patient Response to Education:  (patient will continue to benefit from further education)     Goals:  Encounter Problems       Encounter Problems (Active)       OT Problem       LTG - Patient will complete grooming with set up. (Met)       Start:  10/19/24    Expected End:  10/29/24    Resolved:  10/23/24         LTG - Patient will complete toileting with supervision. (Progressing)       Start:  10/19/24    Expected End:  10/29/24            LTG - Patient will complete bathing with supervision. (Met)       Start:  10/19/24    Expected  End:  10/29/24    Resolved:  10/23/24         LTG - Patient will complete UE dressing with set up. (Met)       Start:  10/19/24    Expected End:  10/29/24    Resolved:  10/23/24         LTG - Patient will complete LE dressing using adaptive equip as needed with supervision. (Progressing)       Start:  10/19/24    Expected End:  10/29/24            LTG - Patient will complete commode transfer via device as needed with supervision. (Progressing)       Start:  10/19/24    Expected End:  10/29/24            LTG - Patient will complete tub/shower transfer using DME as needed with sba. (Progressing)       Start:  10/19/24    Expected End:  10/29/24            LTG - Patient will complete functional mobility via device as needed with s/sba. (Progressing)       Start:  10/19/24    Expected End:  10/29/24            LTG - Patient will complete light meal prep or kitchen access with sba. (Progressing)       Start:  10/19/24    Expected End:  10/29/24            STG - Patient will complete toileting with cga. (Met)       Start:  10/19/24    Expected End:  10/26/24    Resolved:  10/23/24         STG - Patient will complete bathing with sba. (Met)       Start:  10/19/24    Expected End:  10/26/24    Resolved:  10/23/24         STG - Patient will complete LE dressing using adaptive equip as needed with cga. (Met)       Start:  10/19/24    Expected End:  10/26/24    Resolved:  10/23/24         STG - Patient will complete commode transfer via device as needed with cga. (Met)       Start:  10/19/24    Expected End:  10/26/24    Resolved:  10/23/24         STG - Patient will complete tub/shower transfer using DME as needed with min assist. (Met)       Start:  10/19/24    Expected End:  10/26/24    Resolved:  10/23/24         STG - Patient will complete functional mobility via device as needed with min assist. (Met)       Start:  10/19/24    Expected End:  10/26/24    Resolved:  10/23/24

## 2024-10-24 NOTE — PROGRESS NOTES
"  Jahaira Moore is a 69 y.o. female on day 6 of admission presenting with Cerebrovascular accident (CVA) due to occlusion of right vertebral artery (Multi).    Subjective   Patient was seen today in therapy.  Had some loss of balance both in the ADL room and with general ambulation.  Long discussion was had regarding the need for support at home after discharge due to high risk of falls.  Patient is aware.  Also had discussion with therapy and discharge planning.       Objective       Physical Exam pleasant female no apparent distress alert and oriented x 3  Chest clear to auscultation bilaterally  Cardiovascular S1-S2  Abdomen soft nontender nondistended with active bowel sounds  Negative Homans bilaterally.  Ambulation with up to min to mod assist due to high risk of falls.  Ataxic gait with scissoring noted.  Up to min to mod assist for ambulation.  Function: Up to min to mod assist  Assessment/Plan        Last Recorded Vitals  Blood pressure 160/90, pulse 65, temperature 36 °C (96.8 °F), temperature source Temporal, resp. rate 18, height 1.6 m (5' 3\"), weight 61.2 kg (135 lb), SpO2 96%.    Therapy notes from last 24 hours reviewed.    Relevant Results                  Scheduled medications  aspirin, 81 mg, oral, Daily  atorvastatin, 40 mg, oral, Nightly  buPROPion XL, 300 mg, oral, Daily  cholecalciferol, 1,000 Units, oral, Daily  clopidogrel, 75 mg, oral, Daily  DULoxetine, 90 mg, oral, Daily  enoxaparin, 40 mg, subcutaneous, q24h  gabapentin, 300 mg, oral, TID  pantoprazole, 40 mg, oral, Daily  predniSONE, 1 mg, oral, Daily  predniSONE, 5 mg, oral, Daily  traZODone, 50 mg, oral, Nightly      Continuous medications     PRN medications  PRN medications: acetaminophen, alum-mag hydroxide-simeth, diclofenac sodium, melatonin, sennosides-docusate sodium, tiZANidine     No results found for this or any previous visit (from the past 24 hours).             Assessment/Plan   Principal Problem:    Cerebrovascular accident " (CVA) due to occlusion of right vertebral artery (Multi)  Active Problems:    Ataxia        1.  CVA  Aspirin 81 mg daily  Lipitor 40 mg daily  Plavix 75 mg daily  Begin physical therapy for  transfers, bed mobility, lower extremity strengthening and coordination, gait training with an assistive device  Begin occupational therapy for ADL retraining and ADL activities, transfers, exercises to improve her coordination     2.  Hyperlipidemia  Lipitor 40 mg daily     3.  Rheumatoid arthritis   prednisone 6 mg and 1 mg daily     4.  History of depression  Wellbutrin 300 mg monitor for adverse mood changes  Cymbalta 90 mg daily  Trazodone 50 mg at at bedtime  Neurontin 300 mg 3 times a day     5.  DVT prophylaxis  Lovenox 40 mg every 24 hours     6.  GI protectant  Protonix 40 mg daily     7.  Elevated BUN to creatinine  Replete blood work, encourage fluids     8. FENGI  She is on DVT prophylaxis with Lovenox  She is on GI protectant with Protonix  She is constipated and is a bowel program has been started  She is a fall risk fall precautions are being utilized    10/24  -Patient will continue on aspirin statin and Plavix for stroke prophylaxis  -Patient's balance is her major limitation with ataxia.  She is unsteady with high risk of falls.  Ongoing education and safety awareness instituted.  -Patient wants to be discharged home with intermittent assist.  Recommendations are for her to go home with her sister for more support  -Vitals remaining stable.  Blood pressure slightly elevated but not on medication.  Will discuss with patient about adding a small dose of Norvasc.  -Will also discussed use of Neurontin and Zanaflex.  Those can create some unsteadiness or imbalance.  -Continue supportive medications for her mood.  -Discharge home with supervision/standby assist overall.    I was present and led the team conference. The plan of care was developed and agreed upon as discussed and documented during the team meeting.   See separate Note.    Team conference today with the rehab team - PT/OT/ST, SW, RN, Dietary, Case Management. Additional separate note in the chart for today. Over 35 min spent with paint care, team meetings, and coordination of care.         I spent 35 minutes in the professional and overall care of this patient.      Meri Olvera MD

## 2024-10-24 NOTE — CARE PLAN
The patient's goals for the shift include  successful therapy     The clinical goals for the shift include safety

## 2024-10-25 PROCEDURE — 2500000002 HC RX 250 W HCPCS SELF ADMINISTERED DRUGS (ALT 637 FOR MEDICARE OP, ALT 636 FOR OP/ED)

## 2024-10-25 PROCEDURE — 97535 SELF CARE MNGMENT TRAINING: CPT | Mod: GO,CO

## 2024-10-25 PROCEDURE — 1180000001 HC REHAB PRIVATE ROOM DAILY

## 2024-10-25 PROCEDURE — 2500000001 HC RX 250 WO HCPCS SELF ADMINISTERED DRUGS (ALT 637 FOR MEDICARE OP)

## 2024-10-25 PROCEDURE — 97116 GAIT TRAINING THERAPY: CPT | Mod: GP

## 2024-10-25 PROCEDURE — 97530 THERAPEUTIC ACTIVITIES: CPT | Mod: GO,CO

## 2024-10-25 PROCEDURE — 99232 SBSQ HOSP IP/OBS MODERATE 35: CPT

## 2024-10-25 PROCEDURE — 2500000004 HC RX 250 GENERAL PHARMACY W/ HCPCS (ALT 636 FOR OP/ED)

## 2024-10-25 PROCEDURE — 97530 THERAPEUTIC ACTIVITIES: CPT | Mod: GP

## 2024-10-25 ASSESSMENT — PAIN - FUNCTIONAL ASSESSMENT
PAIN_FUNCTIONAL_ASSESSMENT: 0-10

## 2024-10-25 ASSESSMENT — COGNITIVE AND FUNCTIONAL STATUS - GENERAL
MOBILITY SCORE: 23
DAILY ACTIVITIY SCORE: 24
CLIMB 3 TO 5 STEPS WITH RAILING: A LITTLE

## 2024-10-25 ASSESSMENT — PAIN DESCRIPTION - DESCRIPTORS: DESCRIPTORS: DISCOMFORT

## 2024-10-25 ASSESSMENT — PAIN SCALES - GENERAL
PAINLEVEL_OUTOF10: 3
PAINLEVEL_OUTOF10: 7
PAINLEVEL_OUTOF10: 5 - MODERATE PAIN
PAINLEVEL_OUTOF10: 1
PAINLEVEL_OUTOF10: 0 - NO PAIN
PAINLEVEL_OUTOF10: 5 - MODERATE PAIN
PAINLEVEL_OUTOF10: 7
PAINLEVEL_OUTOF10: 0 - NO PAIN

## 2024-10-25 ASSESSMENT — PAIN SCALES - WONG BAKER
WONGBAKER_NUMERICALRESPONSE: NO HURT
WONGBAKER_NUMERICALRESPONSE: HURTS EVEN MORE

## 2024-10-25 ASSESSMENT — PAIN DESCRIPTION - LOCATION: LOCATION: HAND

## 2024-10-25 ASSESSMENT — PAIN DESCRIPTION - ORIENTATION: ORIENTATION: RIGHT

## 2024-10-25 ASSESSMENT — ACTIVITIES OF DAILY LIVING (ADL)
HOME_MANAGEMENT_TIME_ENTRY: 20
HOME_MANAGEMENT_TIME_ENTRY: 45

## 2024-10-25 NOTE — CARE PLAN
The patient's goals for the shift include  decrease pain.     The clinical goals for the shift include Pain will remain free from injury or pain.

## 2024-10-25 NOTE — PROGRESS NOTES
Physical Therapy    Physical Therapy Treatment    Patient Name: Jahaira Moore  MRN: [id number]  Department: [facility] REHAB  Room: [address] [place] [address]  Today's Date: 10/25/2024  Time Calculation  Start Time: 1300  Stop Time: 1345  Time Calculation (min): 45 min         Assessment/Plan   PT Assessment  End of Session Patient Position: Up in chair, Alarm on     PT Plan  Treatment/Interventions: Bed mobility, Transfer training, Gait training, Stair training, Balance training, Strengthening, Endurance training, Therapeutic exercise, Therapeutic activity, Neuromuscular re-education  PT Plan: Ongoing PT  PT Frequency: 5 times per week (6x prn)  PT Discharge Recommendations:  (Anticipate discharge home with the need for intermittent supervision and assist with higher level mobility tasks and due to impulsivity and decreased safety awareness.)  Equipment Recommended upon Discharge: Wheeled walker  PT Recommended Transfer Status: Assist x1      General Visit Information:      General  Patient Position Received: Up in chair, Alarm on  General Comment: pleasant and cooperative    Subjective   Precautions:  Precautions  Medical Precautions: Fall precautions        Objective   Pain:  Pain Assessment  0-10 (Numeric) Pain Score:  (no pain reported during session)     Treatments:  Therapeutic Activity  Therapeutic Activity Performed:  (pt performed 10/10 cone retrieval from various heights utilizing reacher when appropriate with SBA.)    Ambulation/Gait Training  Ambulation/Gait Training Performed:  (gait training 150' on tile and 75' on carpet with ww, 2# ankle wts to improve proprioception and decrease ataxia, with SBA.)    Transfers  Transfer:  (transfer training sit/stand off various surfaces with supervision)    Stairs  Stairs:  (up/down 5 steps with 2 rails and CGA. Up/down flight of stairs with 1 rail, step-to pattern and min assist. Left toe drag when ascending occured x1.)     Object From Floor  Devices: Reacher  Assist Level:   (SBA)    Wheelchair Activities  Wheelchair Parts Management:  (indep with brakes and removing leg rest.)  Propulsion:  (150' modified indep)      Education Documentation  Patient educated in safe techniques for gait with a device in order to maximize safety and functional independence.    Patient educated in safe and proper transfer techniques including proper positioning and hand/foot placement to maximize safety and functional independence.    Patient educated in stair training techniques including proper sequencing, hand advancement on rails and safe foot placement to maximize functional independence.          Encounter Problems       Encounter Problems (Active)       PT Problem       STG - Bed mobility with supervision (Met)       Start:  10/19/24    Expected End:  10/24/24    Resolved:  10/23/24         STG - Transfers with CGA (Progressing)       Start:  10/19/24    Expected End:  10/24/24            STG - Pt will amb 100 ft with wheeled walker and min/mod assist x1. (Met)       Start:  10/19/24    Expected End:  10/24/24    Resolved:  10/23/24         STG - Pt will negotiate 2 stairs with rails and min assist x2. (Met)       Start:  10/19/24    Expected End:  10/24/24    Resolved:  10/23/24         LTG - Mod independent bed mobility. (Progressing)       Start:  10/19/24    Expected End:  10/29/24            LTG - Mod independent transfers (Progressing)       Start:  10/19/24    Expected End:  10/29/24            LTG - Pt will amb 150 ft with wheeled walker and SBA (Progressing)       Start:  10/19/24    Expected End:  10/29/24            LTG - Pt will negotiate 5 stairs with rails and min assist x1. (Progressing)       Start:  10/19/24    Expected End:  10/29/24               Pain - Adult

## 2024-10-25 NOTE — PROGRESS NOTES
Occupational Therapy    OT Treatment    Patient Name: Jahaira Moore  MRN: [id number]  Department: [facility] REHAB  Room: [address] [place] [address]  Today's Date: 10/25/2024  Time Calculation  Start Time: 0830  Stop Time: 0915  Time Calculation (min): 45 min    Plan:  Treatment Interventions: ADL retraining, Endurance training, Patient/family training, Neuromuscular reeducation, Functional transfer training  OT Frequency: 90 min/5 days per week (x 10 days)  OT Discharge Recommendations:  (anticipate need for initial supervision with ADLs, transfers, and mobility secondary to decreased safety awareness)  Equipment Recommended upon Discharge:  (3-in-1 commode, wheeled walker, tub seat, reacher)  Treatment Interventions: ADL retraining, Endurance training, Patient/family training, Neuromuscular reeducation, Functional transfer training    Subjective   Previous Visit Info:  OT Last Visit  OT Received On: 10/25/24  General:  General  Prior to Session Communication: Bedside nurse  Patient Position Received: Up in chair, Alarm on  General Comment: R wrist splint donned for comfort. Agreeable to OT session.  Precautions:  Medical Precautions: Fall precautions    Pain:  Pain Assessment  Pain Assessment: 0-10  0-10 (Numeric) Pain Score: 7  Pain Type: Chronic pain  Pain Location: Back  Pain Orientation: Right, Left  Pain Interventions: Medication (See MAR), Ambulation/increased activity, Relaxation technique, Rest, Repositioned    Bed Mobility/Transfers: Transfers  Transfer: Yes  Transfer 1  Trials/Comments 1: Sit to stand transfers with SBA- with and without UE support.  Transfers 2  Trials/Comments 2: Stand pivot transfers from wheelchair<>couch with SBA to L and R. Occasional cues for setup of chair.      Functional Mobility:  Functional Mobility  Functional Mobility Performed: Yes  Functional Mobility 1  Comments 1: Functional wheelchair mobility around OT clinic and patient's room with Mod I  Modalities:     IADL's: Light Housekeeping  Light  Housekeeping: Per patient's request, pt completes laundry task  loading and unloading washer at wheelchair level and stands as needed with SBA/Supervision with UE support on washer.  EDUCATION: Lengthy discussion during treatment session regarding discharge plan. Recommending patient to be with sister (either at sister's home or patient's home with sister's assistance) for safest discharge. Patient reports confidence at wheelchair level indicating discharge home alone and assistance from family members checking in despite conversation about discharge recommendations of assist of another person.    Goals:  Encounter Problems       Encounter Problems (Active)       OT Problem       LTG - Patient will complete grooming with set up. (Met)       Start:  10/19/24    Expected End:  10/29/24    Resolved:  10/23/24         LTG - Patient will complete toileting with supervision. (Progressing)       Start:  10/19/24    Expected End:  10/29/24            LTG - Patient will complete bathing with supervision. (Met)       Start:  10/19/24    Expected End:  10/29/24    Resolved:  10/23/24         LTG - Patient will complete UE dressing with set up. (Met)       Start:  10/19/24    Expected End:  10/29/24    Resolved:  10/23/24         LTG - Patient will complete LE dressing using adaptive equip as needed with supervision. (Progressing)       Start:  10/19/24    Expected End:  10/29/24            LTG - Patient will complete commode transfer via device as needed with supervision. (Progressing)       Start:  10/19/24    Expected End:  10/29/24            LTG - Patient will complete tub/shower transfer using DME as needed with sba. (Progressing)       Start:  10/19/24    Expected End:  10/29/24            LTG - Patient will complete functional mobility via device as needed with s/sba. (Progressing)       Start:  10/19/24    Expected End:  10/29/24            LTG - Patient will complete light meal prep or kitchen access with sba.  (Progressing)       Start:  10/19/24    Expected End:  10/29/24            STG - Patient will complete toileting with cga. (Met)       Start:  10/19/24    Expected End:  10/26/24    Resolved:  10/23/24         STG - Patient will complete bathing with sba. (Met)       Start:  10/19/24    Expected End:  10/26/24    Resolved:  10/23/24         STG - Patient will complete LE dressing using adaptive equip as needed with cga. (Met)       Start:  10/19/24    Expected End:  10/26/24    Resolved:  10/23/24         STG - Patient will complete commode transfer via device as needed with cga. (Met)       Start:  10/19/24    Expected End:  10/26/24    Resolved:  10/23/24         STG - Patient will complete tub/shower transfer using DME as needed with min assist. (Met)       Start:  10/19/24    Expected End:  10/26/24    Resolved:  10/23/24         STG - Patient will complete functional mobility via device as needed with min assist. (Met)       Start:  10/19/24    Expected End:  10/26/24    Resolved:  10/23/24

## 2024-10-25 NOTE — PROGRESS NOTES
"Jahaira Moore is a 69 y.o. female on day 7 of admission presenting with Cerebrovascular accident (CVA) due to occlusion of right vertebral artery (Multi).    Subjective   Patient was seen at bedside and therapy.  No acute events overnight.  Patient is worried about going home to her sisters at walker level as she does not feel they will be able to give her the support she needs.  She has a new onset headache today.  The patient is eating and sleeping well.  She continues to have LBP which she manages with Tylenol and tizanidine.  She states that her lower back pain is improving.  She is using voltaren gel and a wrist splint for her right wrist pain.  She took the tizanidine before therapy yesterday and felt it made her balance worse.  The patient denies stiffness and spasticity.  Continues to see progress with therapy and is tolerating well.  Patient is having regular BM.       Objective     Physical Exam  Constitutional: Appears stated age. In NAD.   HEENT: NC/AT, EOMI, clear sclera, moist mucous membranes  CV: RRR, No M/R/G  PULM: CTAB, no coughing or wheezing  ABDOMEN: Soft, NT/ND. No TTP  SKIN: Normal Color, Warm, Dry, No Rashes   EXTREMITIES: Non-Tender, Full ROM, No Pedal Edema  MSK: TTP right paraspinal muscles  NEURO: A&O x 4, slight ataxic movement of RLE with walking   PSYCH: Normal Mood & Behavior    Function: Min assist.    Last Recorded Vitals  Blood pressure 158/86, pulse 68, temperature 35.4 °C (95.7 °F), resp. rate 18, height 1.6 m (5' 3\"), weight 61.2 kg (135 lb), SpO2 97%.  Intake/Output last 3 Shifts:  I/O last 3 completed shifts:  In: 480 (7.8 mL/kg) [P.O.:480]  Out: - (0 mL/kg)   Weight: 61.2 kg     Relevant Results                             Assessment/Plan   Assessment & Plan  Cerebrovascular accident (CVA) due to occlusion of right vertebral artery (Multi)    Ataxia    1.  CVA  Aspirin 81 mg daily  Lipitor 40 mg daily  Plavix 75 mg daily  Begin physical therapy for  transfers, bed mobility, " lower extremity strengthening and coordination, gait training with an assistive device  Begin occupational therapy for ADL retraining and ADL activities, transfers, exercises to improve her coordination     2.  Hyperlipidemia  Lipitor 40 mg daily     3.  Rheumatoid arthritis   prednisone 6 mg and 1 mg daily     4.  History of depression  Wellbutrin 300 mg monitor for adverse mood changes  Cymbalta 90 mg daily  Trazodone 50 mg at at bedtime  Neurontin 300 mg 3 times a day     5.  DVT prophylaxis  Lovenox 40 mg every 24 hours     6.  GI protectant  Protonix 40 mg daily     7.  Elevated BUN to creatinine  Replete blood work, encourage fluids     8. FENGI  She is on DVT prophylaxis with Lovenox  She is on GI protectant with Protonix  She is constipated and is a bowel program has been started  She is a fall risk fall precautions are being utilized    10/25  -Due to the patient's mobility limitation, the patient agrees to accept a wheelchair to use in and outside the home.  The patient is unable to use a walker due to general weakness and pain.  Having the wheelchair with assistance from the caregiver, the patient will be able to perform daily activities. This will allow her to be safe at discharge due to her decreased balance  -Discussed decreasing tizanidine and gabapentin as additional causes to poor balance  -Tylenol for headache  -Continue DAPT for 3 months from stroke  -Discharge planning           I spent 35 minutes in the professional and overall care of this patient.       Sebastian Greene, DO  PM&R  PGY-2

## 2024-10-25 NOTE — PROGRESS NOTES
Occupational Therapy    OT Treatment    Patient Name: Jahaira Moore  MRN: [id number]  Department: [facility] RE[facility]  Room: [address] [place] [address]  Today's Date: 10/25/2024  Time Calculation  Start Time: 1000  Stop Time: 1045  Time Calculation (min): 45 min        Assessment:  End of Session Patient Position: Up in chair, Alarm on     Plan:  Treatment Interventions: ADL retraining, Endurance training, Patient/family training, Neuromuscular reeducation, Functional transfer training  OT Frequency: 90 min/5 days per week (x 10 days)  OT Discharge Recommendations:  (anticipate need for initial supervision with ADLs, transfers, and mobility secondary to decreased safety awareness)  Equipment Recommended upon Discharge:  (3-in-1 commode, wheeled walker, tub seat, reacher)  Treatment Interventions: ADL retraining, Endurance training, Patient/family training, Neuromuscular reeducation, Functional transfer training    Subjective   Previous Visit Info:  OT Last Visit  OT Received On: 10/25/24  General:  General  Prior to Session Communication: Bedside nurse  Patient Position Received: Up in chair, Alarm on  General Comment: Patient agreeable to OT session.  Precautions:  Medical Precautions: Fall precautions    Pain:  Pain Assessment  Pain Assessment: 0-10  0-10 (Numeric) Pain Score: 7  Pain Type: Chronic pain  Pain Location: Back  Pain Orientation: Lower  Pain Interventions: Medication (See MAR), Ambulation/increased activity, Rest, Distraction, Repositioned    Bed Mobility/Transfers: Transfers  Transfer: Yes  Transfer 1  Trials/Comments 1: Sit to stand transfers with SBA  Transfers 2  Trials/Comments 2: Stand pivot transfers from wheelchair<>couch with SBA to L and R. Occasional cues for setup of chair.  Modalities:     IADL's: Kitchen Mobility  Kitchen-Mobility Level: Wheelchair  Kitchen Activity: Retrieve items, Transport items  Kitchen Mobility Comments: Patient performs simple beverage prep at kitchen counter, intermittently standing at counter,  retrieving items from drawers and lower cupboards and fridge with Supervision WC level and SBA for standing.    Light Housekeeping  Light Housekeeping: Pet care task performed with  patient completes bending to complete scooping and placing into bag simulating care for cat at wheelchair level with supervision overall.  EDUCATION:   Continued education provided on home going recommendations. Education provided on toilet riser/handles over the toilet however patient reports unsure if it would fit around items around toilet and patient doesn't feel she needs the riser- continued education provided.    Goals:  Encounter Problems       Encounter Problems (Active)       OT Problem       LTG - Patient will complete grooming with set up. (Met)       Start:  10/19/24    Expected End:  10/29/24    Resolved:  10/23/24         LTG - Patient will complete toileting with supervision. (Progressing)       Start:  10/19/24    Expected End:  10/29/24            LTG - Patient will complete bathing with supervision. (Met)       Start:  10/19/24    Expected End:  10/29/24    Resolved:  10/23/24         LTG - Patient will complete UE dressing with set up. (Met)       Start:  10/19/24    Expected End:  10/29/24    Resolved:  10/23/24         LTG - Patient will complete LE dressing using adaptive equip as needed with supervision. (Progressing)       Start:  10/19/24    Expected End:  10/29/24            LTG - Patient will complete commode transfer via device as needed with supervision. (Progressing)       Start:  10/19/24    Expected End:  10/29/24            LTG - Patient will complete tub/shower transfer using DME as needed with sba. (Progressing)       Start:  10/19/24    Expected End:  10/29/24            LTG - Patient will complete functional mobility via device as needed with s/sba. (Progressing)       Start:  10/19/24    Expected End:  10/29/24            LTG - Patient will complete light meal prep or kitchen access with sba.  (Progressing)       Start:  10/19/24    Expected End:  10/29/24            STG - Patient will complete toileting with cga. (Met)       Start:  10/19/24    Expected End:  10/26/24    Resolved:  10/23/24         STG - Patient will complete bathing with sba. (Met)       Start:  10/19/24    Expected End:  10/26/24    Resolved:  10/23/24         STG - Patient will complete LE dressing using adaptive equip as needed with cga. (Met)       Start:  10/19/24    Expected End:  10/26/24    Resolved:  10/23/24         STG - Patient will complete commode transfer via device as needed with cga. (Met)       Start:  10/19/24    Expected End:  10/26/24    Resolved:  10/23/24         STG - Patient will complete tub/shower transfer using DME as needed with min assist. (Met)       Start:  10/19/24    Expected End:  10/26/24    Resolved:  10/23/24         STG - Patient will complete functional mobility via device as needed with min assist. (Met)       Start:  10/19/24    Expected End:  10/26/24    Resolved:  10/23/24

## 2024-10-25 NOTE — PROGRESS NOTES
Physical Therapy    Physical Therapy Treatment    Patient Name: Jahaira Moore  MRN: [id number]  Department: [facility] REH[facility]  Room: [address] [place] [address]  Today's Date: 10/25/2024  Time Calculation  Start Time: 0915  Stop Time: 1000  Time Calculation (min): 45 min         Assessment/Plan   PT Assessment  End of Session Communication:  (OT)  End of Session Patient Position: Up in chair, Alarm on     PT Plan  Treatment/Interventions: Bed mobility, Transfer training, Gait training, Stair training, Balance training, Strengthening, Endurance training, Therapeutic exercise, Therapeutic activity, Neuromuscular re-education  PT Plan: Ongoing PT  PT Frequency: 5 times per week (6x prn)  PT Discharge Recommendations:  (Anticipate discharge home with the need for intermittent supervision and assist with higher level mobility tasks and due to impulsivity and decreased safety awareness.)  Equipment Recommended upon Discharge: Wheeled walker  PT Recommended Transfer Status: Assist x1      General Visit Information:   PT  Visit  PT Received On: 10/25/24  General  Prior to Session Communication:  (OT)  Patient Position Received: Up in chair, Alarm on  General Comment: Again discussing safety with home going and plans for discharge.  Pt agreeable to w/c for increased independence and safety at home.    Subjective   Precautions:  Precautions  Medical Precautions: Fall precautions            Objective   Pain:  Pain Assessment  Pain Assessment: 0-10  0-10 (Numeric) Pain Score: 0 - No pain    Treatments:  Ambulation/Gait Training  Ambulation/Gait Training Performed:  (Pt ambulates 100 ft x 2, 100 ft x 2 in figure 8 pattern with FWW and CGA.  Pt kicking walker with LLE at times.  Occasional hip adduction with turns but improved from crossing midline.)  Transfers  Transfer:  (Pt performs sit/stand transfers with SBA. Pt performs s/s x 5 reps without UE support with SBA.)  Transfer 1  Transfer From 1:  (Pt performs pivot transfers from w/c to couch from R and L  with SBA.  Pt performs pivot transfers from couch to w/c from L and R with SBA.)  Transfer to 1:  (Pt performs sit/stand transfers with SBA/supervision.)    Wheelchair Activities  Propulsion:  (Pt propels w/c 150 ft with BUE over tile, carpet and thresholds with mod I.  Pt appropriately sets up for pivot transfers and manages brakes.)      Education Documentation  Pt educated on techniques for transfers and gait training with device in order to maximize safety and independence.    Encounter Problems       Encounter Problems (Active)       PT Problem       STG - Bed mobility with supervision (Met)       Start:  10/19/24    Expected End:  10/24/24    Resolved:  10/23/24         STG - Transfers with CGA (Progressing)       Start:  10/19/24    Expected End:  10/24/24            STG - Pt will amb 100 ft with wheeled walker and min/mod assist x1. (Met)       Start:  10/19/24    Expected End:  10/24/24    Resolved:  10/23/24         STG - Pt will negotiate 2 stairs with rails and min assist x2. (Met)       Start:  10/19/24    Expected End:  10/24/24    Resolved:  10/23/24         LTG - Mod independent bed mobility. (Progressing)       Start:  10/19/24    Expected End:  10/29/24            LTG - Mod independent transfers (Progressing)       Start:  10/19/24    Expected End:  10/29/24            LTG - Pt will amb 150 ft with wheeled walker and SBA (Progressing)       Start:  10/19/24    Expected End:  10/29/24            LTG - Pt will negotiate 5 stairs with rails and min assist x1. (Progressing)       Start:  10/19/24    Expected End:  10/29/24

## 2024-10-26 PROBLEM — N18.9 CHRONIC RENAL INSUFFICIENCY: Status: ACTIVE | Noted: 2024-10-26

## 2024-10-26 LAB
ANION GAP SERPL CALC-SCNC: 10 MMOL/L (ref 10–20)
BUN SERPL-MCNC: 28 MG/DL (ref 6–23)
CALCIUM SERPL-MCNC: 9.3 MG/DL (ref 8.6–10.3)
CHLORIDE SERPL-SCNC: 107 MMOL/L (ref 98–107)
CO2 SERPL-SCNC: 28 MMOL/L (ref 21–32)
CREAT SERPL-MCNC: 1.29 MG/DL (ref 0.5–1.05)
EGFRCR SERPLBLD CKD-EPI 2021: 44 ML/MIN/1.73M*2
ERYTHROCYTE [DISTWIDTH] IN BLOOD BY AUTOMATED COUNT: 12.7 % (ref 11.5–14.5)
GLUCOSE SERPL-MCNC: 92 MG/DL (ref 74–99)
HCT VFR BLD AUTO: 39.2 % (ref 36–46)
HGB BLD-MCNC: 12.7 G/DL (ref 12–16)
MCH RBC QN AUTO: 33.2 PG (ref 26–34)
MCHC RBC AUTO-ENTMCNC: 32.4 G/DL (ref 32–36)
MCV RBC AUTO: 103 FL (ref 80–100)
NRBC BLD-RTO: 0 /100 WBCS (ref 0–0)
PLATELET # BLD AUTO: 354 X10*3/UL (ref 150–450)
POTASSIUM SERPL-SCNC: 4 MMOL/L (ref 3.5–5.3)
RBC # BLD AUTO: 3.82 X10*6/UL (ref 4–5.2)
SODIUM SERPL-SCNC: 141 MMOL/L (ref 136–145)
WBC # BLD AUTO: 6.2 X10*3/UL (ref 4.4–11.3)

## 2024-10-26 PROCEDURE — 2500000002 HC RX 250 W HCPCS SELF ADMINISTERED DRUGS (ALT 637 FOR MEDICARE OP, ALT 636 FOR OP/ED)

## 2024-10-26 PROCEDURE — 2500000001 HC RX 250 WO HCPCS SELF ADMINISTERED DRUGS (ALT 637 FOR MEDICARE OP)

## 2024-10-26 PROCEDURE — 99232 SBSQ HOSP IP/OBS MODERATE 35: CPT | Performed by: PHYSICAL MEDICINE & REHABILITATION

## 2024-10-26 PROCEDURE — 85027 COMPLETE CBC AUTOMATED: CPT

## 2024-10-26 PROCEDURE — 97530 THERAPEUTIC ACTIVITIES: CPT | Mod: GO

## 2024-10-26 PROCEDURE — 97116 GAIT TRAINING THERAPY: CPT | Mod: GP

## 2024-10-26 PROCEDURE — 1180000001 HC REHAB PRIVATE ROOM DAILY

## 2024-10-26 PROCEDURE — 36415 COLL VENOUS BLD VENIPUNCTURE: CPT

## 2024-10-26 PROCEDURE — 97530 THERAPEUTIC ACTIVITIES: CPT | Mod: GP

## 2024-10-26 PROCEDURE — 97535 SELF CARE MNGMENT TRAINING: CPT | Mod: GO

## 2024-10-26 PROCEDURE — 2500000004 HC RX 250 GENERAL PHARMACY W/ HCPCS (ALT 636 FOR OP/ED)

## 2024-10-26 PROCEDURE — 80048 BASIC METABOLIC PNL TOTAL CA: CPT

## 2024-10-26 ASSESSMENT — PAIN DESCRIPTION - ORIENTATION
ORIENTATION: RIGHT
ORIENTATION: LEFT;LOWER

## 2024-10-26 ASSESSMENT — PAIN SCALES - GENERAL
PAINLEVEL_OUTOF10: 2
PAINLEVEL_OUTOF10: 8
PAINLEVEL_OUTOF10: 0 - NO PAIN
PAINLEVEL_OUTOF10: 8
PAINLEVEL_OUTOF10: 7
PAINLEVEL_OUTOF10: 0 - NO PAIN
PAINLEVEL_OUTOF10: 0 - NO PAIN
PAINLEVEL_OUTOF10: 5 - MODERATE PAIN

## 2024-10-26 ASSESSMENT — PAIN - FUNCTIONAL ASSESSMENT
PAIN_FUNCTIONAL_ASSESSMENT: 0-10

## 2024-10-26 ASSESSMENT — PAIN DESCRIPTION - LOCATION
LOCATION: BACK
LOCATION: WRIST
LOCATION: BACK
LOCATION: BACK

## 2024-10-26 ASSESSMENT — ACTIVITIES OF DAILY LIVING (ADL)
BATHING_LEVEL_OF_ASSISTANCE: CLOSE SUPERVISION;CONTACT GUARD
BATHING_WHERE_ASSESSED: STANDING SINKSIDE;SITTING SINKSIDE
HOME_MANAGEMENT_TIME_ENTRY: 25

## 2024-10-26 NOTE — PROGRESS NOTES
Physical Therapy    Physical Therapy Treatment    Patient Name: Jahaira Moore  MRN: [id number]  Department: [facility] RE[facility]  Room: [address] [place] [address]  Today's Date: 10/26/2024  Time Calculation  Start Time: 0915  Stop Time: 1000  Time Calculation (min): 45 min         Assessment/Plan   PT Assessment  End of Session Communication: Bedside nurse  End of Session Patient Position: Up in chair, Alarm on     PT Plan  Treatment/Interventions: Bed mobility, Transfer training, Gait training, Stair training, Balance training, Strengthening, Endurance training, Therapeutic exercise, Therapeutic activity, Neuromuscular re-education  PT Plan: Ongoing PT  PT Frequency: 5 times per week (6x prn)  PT Discharge Recommendations:  (Anticipate discharge home with the need for intermittent supervision and assist with higher level mobility tasks and due to impulsivity and decreased safety awareness.)  Equipment Recommended upon Discharge: Wheeled walker  PT Recommended Transfer Status: Assist x1      General Visit Information:   PT  Visit  PT Received On: 10/26/24  General  Family/Caregiver Present: Yes  Caregiver Feedback: sister Hortencia present for family training  Co-Treatment:  (Sister states that herself and another sister plans on staying with pt at discharge to provide hands on assist for ambulation. Hortencia reporting she wants pt to stay with her, but pt not receptive to this.)  Prior to Session Communication: Bedside nurse  Patient Position Received: Up in chair, Alarm on  General Comment: Pt wanting to work on ambulation and stairs.    Subjective   Precautions:  Precautions  Medical Precautions: Fall precautions    Objective   Pain:  Pain Assessment  Pain Assessment: 0-10  0-10 (Numeric) Pain Score: 0 - No pain  Response to Interventions: No complaints of increased pain.       Treatments:  Ambulation/Gait Training  Ambulation/Gait Training Performed: Yes  Ambulation/Gait Training 1  Comments/Distance (ft) 1: Pt amb 100' x 2 with FWW, sister providing  appropriate CGA. Pt displaying occasional scissoring leading to LOB. Educated sister on the fact that at times pt displays kicking walker leg, clipping corners of environmental obstacles, and scissoring. Also educated sister that gait quality may decrease when pt is distracted by functional tasks, conversation, etc.  Transfers  Transfer:  (Sist/stand transfer training with Supervision.)    Stairs  Stairs: Yes  Stairs 2  Comment/Number of Steps 2: Up/down 5 training stairs with B rails and CGA, cues for NR pattern. Also up/down 1 flight of stairs with 1 rail (L up) with sister providing appropriate CGA on ascent, cues for NR pattern. On descent pt has 1 episode  of retro LOB requiring min A to correct, pt states sister is providing too much physical help and caused her to have LOB.    Education Documentation  Pt educated on techniques for transfers and gait training with device in order to maximize safety and independence.  Pt educated on stair training techniques including proper sequencing to maximize safety.         Encounter Problems       Encounter Problems (Active)       PT Problem       STG - Bed mobility with supervision (Met)       Start:  10/19/24    Expected End:  10/24/24    Resolved:  10/23/24         STG - Transfers with CGA (Progressing)       Start:  10/19/24    Expected End:  10/24/24            STG - Pt will amb 100 ft with wheeled walker and min/mod assist x1. (Met)       Start:  10/19/24    Expected End:  10/24/24    Resolved:  10/23/24         STG - Pt will negotiate 2 stairs with rails and min assist x2. (Met)       Start:  10/19/24    Expected End:  10/24/24    Resolved:  10/23/24         LTG - Mod independent bed mobility. (Progressing)       Start:  10/19/24    Expected End:  10/29/24            LTG - Mod independent transfers (Progressing)       Start:  10/19/24    Expected End:  10/29/24            LTG - Pt will amb 150 ft with wheeled walker and SBA (Progressing)       Start:  10/19/24     Expected End:  10/29/24            LTG - Pt will negotiate 5 stairs with rails and min assist x1. (Progressing)       Start:  10/19/24    Expected End:  10/29/24               Pain - Adult

## 2024-10-26 NOTE — PROGRESS NOTES
"  Jahaira Moore is a 69 y.o. female on day 8 of admission presenting with Cerebrovascular accident (CVA) due to occlusion of right vertebral artery (Multi).    Subjective   Patient was seen today in therapy and in her room.  She is independent utilizing a wheelchair for mobility.  Her sister was present and they are working on going up and down stairs with contact-guard assist.  Patient is working on ambulation with a walker at contact-guard assist.  Discharge planning in place.    Patient without complaints.  Continue to work on balance for safe discharge on Wednesday.       Objective       Physical Exam pleasant female in no apparent distress.  She is alert and oriented x 3  .  Chest clear to auscultation bilaterally  Cardiovascular S1-S2  Abdomen soft nontender nondistended with active bowel sounds  Extremity with out gross motor deficit appreciated  Negative Homans bilaterally.  Motor strength 5 out of 5 in all 4 extremities.  Her deficits are in standing and balance.  Contact-guard to standby assist.  Function: Contact-guard assist to standby assist.  Assessment/Plan        Last Recorded Vitals  Blood pressure 145/80, pulse 63, temperature 35.8 °C (96.4 °F), resp. rate 16, height 1.6 m (5' 3\"), weight 61.2 kg (135 lb), SpO2 95%.    Therapy notes from last 24 hours reviewed.    Relevant Results                  Scheduled medications  aspirin, 81 mg, oral, Daily  atorvastatin, 40 mg, oral, Nightly  buPROPion XL, 300 mg, oral, Daily  cholecalciferol, 1,000 Units, oral, Daily  clopidogrel, 75 mg, oral, Daily  DULoxetine, 90 mg, oral, Daily  gabapentin, 300 mg, oral, TID  pantoprazole, 40 mg, oral, Daily  predniSONE, 1 mg, oral, Daily  predniSONE, 5 mg, oral, Daily  traZODone, 50 mg, oral, Nightly      Continuous medications     PRN medications  PRN medications: acetaminophen, alum-mag hydroxide-simeth, diclofenac sodium, melatonin, sennosides-docusate sodium, tiZANidine     Results for orders placed or performed during " the hospital encounter of 10/18/24 (from the past 24 hours)   Basic Metabolic Panel   Result Value Ref Range    Glucose 92 74 - 99 mg/dL    Sodium 141 136 - 145 mmol/L    Potassium 4.0 3.5 - 5.3 mmol/L    Chloride 107 98 - 107 mmol/L    Bicarbonate 28 21 - 32 mmol/L    Anion Gap 10 10 - 20 mmol/L    Urea Nitrogen 28 (H) 6 - 23 mg/dL    Creatinine 1.29 (H) 0.50 - 1.05 mg/dL    eGFR 44 (L) >60 mL/min/1.73m*2    Calcium 9.3 8.6 - 10.3 mg/dL   CBC   Result Value Ref Range    WBC 6.2 4.4 - 11.3 x10*3/uL    nRBC 0.0 0.0 - 0.0 /100 WBCs    RBC 3.82 (L) 4.00 - 5.20 x10*6/uL    Hemoglobin 12.7 12.0 - 16.0 g/dL    Hematocrit 39.2 36.0 - 46.0 %     (H) 80 - 100 fL    MCH 33.2 26.0 - 34.0 pg    MCHC 32.4 32.0 - 36.0 g/dL    RDW 12.7 11.5 - 14.5 %    Platelets 354 150 - 450 x10*3/uL                Assessment/Plan   Principal Problem:    Cerebrovascular accident (CVA) due to occlusion of right vertebral artery (Multi)  Active Problems:    Ataxia      10/26  -  1.  CVA  Aspirin 81 mg daily  Lipitor 40 mg daily  Plavix 75 mg daily  Begin physical therapy for  transfers, bed mobility, lower extremity strengthening and coordination, gait training with an assistive device  Begin occupational therapy for ADL retraining and ADL activities, transfers, exercises to improve her coordination     2.  Hyperlipidemia  Lipitor 40 mg daily     3.  Rheumatoid arthritis   prednisone 6 mg and 1 mg daily     4.  History of depression  Wellbutrin 300 mg monitor for adverse mood changes  Cymbalta 90 mg daily  Trazodone 50 mg at at bedtime  Neurontin 300 mg 3 times a day     5.  DVT prophylaxis  Lovenox 40 mg every 24 hours     6.  GI protectant  Protonix 40 mg daily     7.  Elevated BUN to creatinine  Replete blood work, encourage fluids     8. FENGI  She is on DVT prophylaxis with Lovenox  She is on GI protectant with Protonix  She is constipated and is a bowel program has been started  She is a fall risk fall precautions are being utilized        10/26  Ongoing stroke prophylaxis with aspirin, Plavix and statin.  -Work on balance with ambulation and support from family  -Independent with wheelchair mobility  -Independent with transfers  -Blood pressure stable.  -Continue Cymbalta  -Discharge planning for early next week.       I spent 35 minutes in the professional and overall care of this patient.      Meri Olvera MD

## 2024-10-26 NOTE — PROGRESS NOTES
Occupational Therapy    OT Treatment    Patient Name: Jahaira Moore  MRN: [id number]  Department: [facility] RE[facility]  Room: [address] [place] [address]  Today's Date: 10/26/2024  Time Calculation  Start Time: 0745  Stop Time: 0830  Time Calculation (min): 45 min        Assessment:  End of Session Communication:  (PT)  End of Session Patient Position: Up in chair, Alarm on     Plan:  Treatment Interventions: ADL retraining, Endurance training, Patient/family training, Neuromuscular reeducation, Functional transfer training  OT Frequency: 90 min/5 days per week (x 10 days)  OT Discharge Recommendations:  (anticipate need for initial supervision with ADLs, transfers, and mobility secondary to decreased safety awareness)  Equipment Recommended upon Discharge:  (3-in-1 commode, wheeled walker, tub seat, reacher)  Treatment Interventions: ADL retraining, Endurance training, Patient/family training, Neuromuscular reeducation, Functional transfer training    Subjective   Previous Visit Info:  OT Last Visit  OT Received On: 10/26/24  General:  General  Family/Caregiver Present: Yes  Caregiver Feedback: sister Hortencia in during most of treatment; discharge teaching done  Prior to Session Communication: Bedside nurse  Patient Position Received: Alarm on, Up in chair  Precautions:  Medical Precautions: Fall precautions            Pain:  Pain Assessment  Pain Assessment: 0-10  0-10 (Numeric) Pain Score: 0 - No pain (pt denies)    Objective         Activities of Daily Living: Grooming  Grooming Level of Assistance: Modified independent  Grooming Where Assessed:  (at sink, own wc setup)    UE Bathing  UE Bathing Level of Assistance: Setup    LE Bathing  LE Bathing Level of Assistance: Close supervision, Contact guard  LE Bathing Where Assessed: Standing sinkside, Sitting sinkside  LE Bathing Comments: cues for support with one hand on sink for balance    UE Dressing  UE Dressing Level of Assistance: Setup  UE Dressing Where Assessed: Wheelchair    LE  Dressing  Pants Level of Assistance: Close supervision, Contact guard (cues for using walker when standing for clothing mgmt)  Sock Level of Assistance: Setup  LE Dressing Where Assessed: Wheelchair       Bed Mobility/Transfers: Transfers  Transfer:  (sit to stand sba, pivot transfer via wwalker sba)    Toilet Transfers  Toilet Transfers Comments: educated on bsc at night and easy clean up tech using liners/chucks d/t sister reporting reluctant to clean up bucket after use  Tub Transfers  Tub Transfers Comments: educated sister on tub dme initially trialed- extended tub bench, also educated on universal tub seat and tub mounted grab bar, pt able to complete tub sit and turn method using universal tub seat with cga and min cues for tech/safety; discussed sponge bathing on 1st floor until safe to complete full flight of stairs to full bathroom.      Functional Mobility:  Functional Mobility  Functional Mobility Performed:  (wc mobility in room distal supervision; approach steps via wwalker and in hallway to tub cga)       EDUCATION:  To pt and sister,    home going recommendations of wc level initially, dme, walker safety techs, recommendation of reacher for safe retrieval, report understanding of info    Goals:  Encounter Problems       Encounter Problems (Active)       OT Problem       LTG - Patient will complete grooming with set up. (Met)       Start:  10/19/24    Expected End:  10/29/24    Resolved:  10/23/24         LTG - Patient will complete toileting with supervision. (Progressing)       Start:  10/19/24    Expected End:  10/29/24            LTG - Patient will complete bathing with supervision. (Met)       Start:  10/19/24    Expected End:  10/29/24    Resolved:  10/23/24         LTG - Patient will complete UE dressing with set up. (Met)       Start:  10/19/24    Expected End:  10/29/24    Resolved:  10/23/24         LTG - Patient will complete LE dressing using adaptive equip as needed with supervision.  (Progressing)       Start:  10/19/24    Expected End:  10/29/24            LTG - Patient will complete commode transfer via device as needed with supervision. (Progressing)       Start:  10/19/24    Expected End:  10/29/24            LTG - Patient will complete tub/shower transfer using DME as needed with sba. (Progressing)       Start:  10/19/24    Expected End:  10/29/24            LTG - Patient will complete functional mobility via device as needed with s/sba. (Progressing)       Start:  10/19/24    Expected End:  10/29/24            LTG - Patient will complete light meal prep or kitchen access with sba. (Progressing)       Start:  10/19/24    Expected End:  10/29/24            STG - Patient will complete toileting with cga. (Met)       Start:  10/19/24    Expected End:  10/26/24    Resolved:  10/23/24         STG - Patient will complete bathing with sba. (Met)       Start:  10/19/24    Expected End:  10/26/24    Resolved:  10/23/24         STG - Patient will complete LE dressing using adaptive equip as needed with cga. (Met)       Start:  10/19/24    Expected End:  10/26/24    Resolved:  10/23/24         STG - Patient will complete commode transfer via device as needed with cga. (Met)       Start:  10/19/24    Expected End:  10/26/24    Resolved:  10/23/24         STG - Patient will complete tub/shower transfer using DME as needed with min assist. (Met)       Start:  10/19/24    Expected End:  10/26/24    Resolved:  10/23/24         STG - Patient will complete functional mobility via device as needed with min assist. (Met)       Start:  10/19/24    Expected End:  10/26/24    Resolved:  10/23/24

## 2024-10-26 NOTE — CARE PLAN
The patient's goals for the shift include improved pain management      The clinical goals for the shift include patient will maintain safety and have improved pain management.

## 2024-10-26 NOTE — CARE PLAN
The patient's goals for the shift include      The clinical goals for the shift include Pain will remain free from injury or pain.

## 2024-10-27 VITALS
TEMPERATURE: 97.9 F | DIASTOLIC BLOOD PRESSURE: 69 MMHG | RESPIRATION RATE: 16 BRPM | BODY MASS INDEX: 23.92 KG/M2 | HEART RATE: 72 BPM | HEIGHT: 63 IN | OXYGEN SATURATION: 96 % | SYSTOLIC BLOOD PRESSURE: 116 MMHG | WEIGHT: 135 LBS

## 2024-10-27 PROCEDURE — 2500000004 HC RX 250 GENERAL PHARMACY W/ HCPCS (ALT 636 FOR OP/ED)

## 2024-10-27 PROCEDURE — 2500000001 HC RX 250 WO HCPCS SELF ADMINISTERED DRUGS (ALT 637 FOR MEDICARE OP)

## 2024-10-27 PROCEDURE — 1180000001 HC REHAB PRIVATE ROOM DAILY

## 2024-10-27 ASSESSMENT — PAIN SCALES - GENERAL
PAINLEVEL_OUTOF10: 3
PAINLEVEL_OUTOF10: 7

## 2024-10-27 ASSESSMENT — PAIN - FUNCTIONAL ASSESSMENT
PAIN_FUNCTIONAL_ASSESSMENT: 0-10
PAIN_FUNCTIONAL_ASSESSMENT: UNABLE TO SELF-REPORT
PAIN_FUNCTIONAL_ASSESSMENT: 0-10

## 2024-10-27 ASSESSMENT — PAIN DESCRIPTION - ORIENTATION: ORIENTATION: RIGHT

## 2024-10-27 ASSESSMENT — PAIN DESCRIPTION - LOCATION: LOCATION: WRIST

## 2024-10-27 NOTE — CARE PLAN
The patient's goals for the shift include      The clinical goals for the shift include Remain hemodynamically stable, get some quality rest.      Problem: Pain - Adult  Goal: Verbalizes/displays adequate comfort level or baseline comfort level  Outcome: Progressing     Problem: Discharge Planning  Goal: Discharge to home or other facility with appropriate resources  Outcome: Progressing     Problem: Chronic Conditions and Co-morbidities  Goal: Patient's chronic conditions and co-morbidity symptoms are monitored and maintained or improved  Outcome: Progressing     Problem: Pain  Goal: Takes deep breaths with improved pain control throughout the shift  Outcome: Progressing  Goal: Turns in bed with improved pain control throughout the shift  Outcome: Progressing  Goal: Walks with improved pain control throughout the shift  Outcome: Progressing  Goal: Performs ADL's with improved pain control throughout shift  Outcome: Progressing  Goal: Participates in PT with improved pain control throughout the shift  Outcome: Progressing     Problem: Skin  Goal: Decreased wound size/increased tissue granulation at next dressing change  Outcome: Progressing  Flowsheets (Taken 10/26/2024 2216)  Decreased wound size/increased tissue granulation at next dressing change: Promote sleep for wound healing  Goal: Participates in plan/prevention/treatment measures  Outcome: Progressing  Flowsheets (Taken 10/26/2024 2216)  Participates in plan/prevention/treatment measures: Elevate heels  Goal: Promote/optimize nutrition  Outcome: Progressing  Flowsheets (Taken 10/26/2024 2216)  Promote/optimize nutrition: Monitor/record intake including meals  Goal: Promote skin healing  Outcome: Progressing  Flowsheets (Taken 10/26/2024 2216)  Promote skin healing:   Assess skin/pad under line(s)/device(s)   Protective dressings over bony prominences

## 2024-10-28 ENCOUNTER — APPOINTMENT (OUTPATIENT)
Dept: PAIN MEDICINE | Facility: CLINIC | Age: 69
End: 2024-10-28
Payer: COMMERCIAL

## 2024-10-28 PROCEDURE — 97530 THERAPEUTIC ACTIVITIES: CPT | Mod: GP

## 2024-10-28 PROCEDURE — 1180000001 HC REHAB PRIVATE ROOM DAILY

## 2024-10-28 PROCEDURE — 97110 THERAPEUTIC EXERCISES: CPT | Mod: GP

## 2024-10-28 PROCEDURE — 2500000001 HC RX 250 WO HCPCS SELF ADMINISTERED DRUGS (ALT 637 FOR MEDICARE OP)

## 2024-10-28 PROCEDURE — 97530 THERAPEUTIC ACTIVITIES: CPT | Mod: GO

## 2024-10-28 PROCEDURE — 97116 GAIT TRAINING THERAPY: CPT | Mod: GP

## 2024-10-28 PROCEDURE — 2500000002 HC RX 250 W HCPCS SELF ADMINISTERED DRUGS (ALT 637 FOR MEDICARE OP, ALT 636 FOR OP/ED)

## 2024-10-28 PROCEDURE — 97112 NEUROMUSCULAR REEDUCATION: CPT | Mod: GO

## 2024-10-28 PROCEDURE — 2500000004 HC RX 250 GENERAL PHARMACY W/ HCPCS (ALT 636 FOR OP/ED)

## 2024-10-28 PROCEDURE — 97535 SELF CARE MNGMENT TRAINING: CPT | Mod: GO

## 2024-10-28 ASSESSMENT — PAIN SCALES - GENERAL
PAINLEVEL_OUTOF10: 4
PAINLEVEL_OUTOF10: 0 - NO PAIN
PAINLEVEL_OUTOF10: 5 - MODERATE PAIN
PAINLEVEL_OUTOF10: 8
PAINLEVEL_OUTOF10: 6

## 2024-10-28 ASSESSMENT — PAIN - FUNCTIONAL ASSESSMENT
PAIN_FUNCTIONAL_ASSESSMENT: 0-10

## 2024-10-28 ASSESSMENT — PAIN DESCRIPTION - LOCATION
LOCATION: HEAD
LOCATION: WRIST

## 2024-10-28 ASSESSMENT — ACTIVITIES OF DAILY LIVING (ADL): HOME_MANAGEMENT_TIME_ENTRY: 15

## 2024-10-28 ASSESSMENT — PAIN DESCRIPTION - ORIENTATION: ORIENTATION: RIGHT

## 2024-10-28 ASSESSMENT — PAIN SCALES - WONG BAKER: WONGBAKER_NUMERICALRESPONSE: HURTS WHOLE LOT

## 2024-10-28 NOTE — PROGRESS NOTES
Physical Therapy    Physical Therapy Treatment    Patient Name: Jahaira Moore  MRN: [id number]  Department: [facility] RE[facility]  Room: [address] [place] [address]  Today's Date: 10/28/2024  Time Calculation  Start Time: 1300  Stop Time: 1345  Time Calculation (min): 45 min         Assessment/Plan   PT Assessment  End of Session Patient Position: Up in chair, Alarm on     PT Plan  Treatment/Interventions: Bed mobility, Transfer training, Gait training, Stair training, Balance training, Strengthening, Endurance training, Therapeutic exercise, Therapeutic activity, Neuromuscular re-education  PT Plan: Ongoing PT  PT Frequency: 5 times per week (6x prn)  PT Discharge Recommendations:  (Anticipate discharge home with the need for intermittent supervision and assist with higher level mobility tasks and due to impulsivity and decreased safety awareness.)  Equipment Recommended upon Discharge: Wheeled walker  PT Recommended Transfer Status: Assist x1      General Visit Information:   PT  Visit  PT Received On: 10/28/24  General  Family/Caregiver Present: Yes (Pt's sister Marleni present for family training this date.  Participates in hands on assist during ambulation.  Educated on stairs for home entry.)  Prior to Session Communication:  (OT)  Patient Position Received: Up in chair, Alarm on  General Comment:  (Pt pleasant and cooperative, no complaints at this time.)    Subjective   Precautions:  Precautions  Medical Precautions: Fall precautions      Objective   Pain:  Pain Assessment  Pain Assessment: 0-10  0-10 (Numeric) Pain Score: 0 - No pain    Treatments:  Therapeutic Exercise  Therapeutic Exercise Performed: Yes  Pt performs BLE seated AROM exercises with 2# ankle weights: marching, LAQ, hip add isometrics with ball, hip abd with red TB, hs curls with red TB, AP 2 x 10 reps each in order to improve strength and endurance for improvement in functional mobility and transfers.     Therapeutic Activity  Therapeutic Activity Performed:  (Pt side steps  R/L x 2 through standing bolsters with FWW and CGA with min cues throughout for safety.)  Therapeutic Activity 1:  (Pt ambulates around PT gym finding 10/10 cones with min cues with FWW and CGA in 4:33 minutes.)    Ambulation/Gait Training  Ambulation/Gait Training Performed:  (Pt ambulates 200 ft, 200 ft with FWW and CGA.)  Transfers  Transfer:  (Pt performs sit/stand transfers with supervision.)  Transfer 1  Trials/Comments 1:  (Pt performs sit/stand transfers with supervision.)    Stairs  Stairs:  (Pt ascends/descends 5 steps x 2 with BHR and CGA.)      Education Documentation  Pt educated on techniques for transfers and gait training with device in order to maximize safety and independence.  Pt educated on stair training techniques including proper sequencing to maximize safety.  Pt educated on therapeutic exercises, including optimal techniques to maximize function.    Encounter Problems       Encounter Problems (Active)       PT Problem       STG - Bed mobility with supervision (Met)       Start:  10/19/24    Expected End:  10/24/24    Resolved:  10/23/24         STG - Transfers with CGA (Progressing)       Start:  10/19/24    Expected End:  10/24/24            STG - Pt will amb 100 ft with wheeled walker and min/mod assist x1. (Met)       Start:  10/19/24    Expected End:  10/24/24    Resolved:  10/23/24         STG - Pt will negotiate 2 stairs with rails and min assist x2. (Met)       Start:  10/19/24    Expected End:  10/24/24    Resolved:  10/23/24         LTG - Mod independent bed mobility. (Progressing)       Start:  10/19/24    Expected End:  10/29/24            LTG - Mod independent transfers (Progressing)       Start:  10/19/24    Expected End:  10/29/24            LTG - Pt will amb 150 ft with wheeled walker and SBA (Progressing)       Start:  10/19/24    Expected End:  10/29/24            LTG - Pt will negotiate 5 stairs with rails and min assist x1. (Progressing)       Start:  10/19/24    Expected  End:  10/29/24

## 2024-10-28 NOTE — PROGRESS NOTES
"  Jahaira Moore is a 69 y.o. female on day 10 of admission presenting with Cerebrovascular accident (CVA) due to occlusion of right vertebral artery (Multi).    Subjective   The patient was seen at bedside.  No acute events overnight.  Blood pressure was elevated this morning without associated symptoms. The patient is eating and sleeping well.  Patient continues to feel they are making improvements with therapy and tolerating it without complications.  Patient denies pain.  Patient denies stiffness or spasms.  Patient is having regular BM.          Objective       Physical Exam   Constitutional: Appears stated age. In NAD.   HEENT: NC/AT, EOMI, clear sclera, moist mucous membranes  CV: RRR, No M/R/G  PULM: CTAB, no coughing or wheezing  ABDOMEN: Soft, NT/ND. No TTP.  SKIN: Normal Color, Warm, Dry, No Rashes   EXTREMITIES: Non-Tender, Full ROM, No Pedal Edema  NEURO: A&O x 4, No gross motor deficits,RLE ataxia  PSYCH: Normal Mood & Behavior    Function: Contact-guard assist to standby assist.    Assessment/Plan        Last Recorded Vitals  Blood pressure 169/82, pulse 63, temperature 36.4 °C (97.5 °F), temperature source Temporal, resp. rate 16, height 1.6 m (5' 3\"), weight 61.2 kg (135 lb), SpO2 98%.    Therapy notes from last 24 hours reviewed.    Relevant Results                  Scheduled medications  aspirin, 81 mg, oral, Daily  atorvastatin, 40 mg, oral, Nightly  buPROPion XL, 300 mg, oral, Daily  cholecalciferol, 1,000 Units, oral, Daily  clopidogrel, 75 mg, oral, Daily  DULoxetine, 90 mg, oral, Daily  gabapentin, 300 mg, oral, TID  pantoprazole, 40 mg, oral, Daily  predniSONE, 1 mg, oral, Daily  predniSONE, 5 mg, oral, Daily  traZODone, 50 mg, oral, Nightly      Continuous medications     PRN medications  PRN medications: acetaminophen, alum-mag hydroxide-simeth, diclofenac sodium, melatonin, sennosides-docusate sodium, tiZANidine     No results found for this or any previous visit (from the past 24 hours).   "             Assessment/Plan   Principal Problem:    Cerebrovascular accident (CVA) due to occlusion of right vertebral artery (Multi)  Active Problems:    Ataxia    Chronic renal insufficiency      10/26  -  1.  CVA  Aspirin 81 mg daily  Lipitor 40 mg daily  Plavix 75 mg daily  Begin physical therapy for  transfers, bed mobility, lower extremity strengthening and coordination, gait training with an assistive device  Begin occupational therapy for ADL retraining and ADL activities, transfers, exercises to improve her coordination     2.  Hyperlipidemia  Lipitor 40 mg daily     3.  Rheumatoid arthritis   prednisone 6 mg and 1 mg daily     4.  History of depression  Wellbutrin 300 mg monitor for adverse mood changes  Cymbalta 90 mg daily  Trazodone 50 mg at at bedtime  Neurontin 300 mg 3 times a day     5.  DVT prophylaxis  Lovenox 40 mg every 24 hours     6.  GI protectant  Protonix 40 mg daily     7.  Elevated BUN to creatinine  Replete blood work, encourage fluids     8. FENGI  She is on DVT prophylaxis with Lovenox  She is on GI protectant with Protonix  She is constipated and is a bowel program has been started  She is a fall risk fall precautions are being utilized       10/28  -Continues to see improvements in  therapy walking 100 feet with FWW x2 at CG level, continues to need work with stairs.  -Blood pressure increasing up to 168/82.  Will continue to monitor and add oral agent if continues to increase  -Will discharge home at wheel chair level due to little support at home and the patients poor balance with ataxic right leg.  -Continue prednisone for RA, will recheck labs prior to discharge to see if any further follow up is needed.  -Discharge planning.       I spent 35 minutes in the professional and overall care of this patient.      Sebastian Greene, DO  PM&R  PGY-2

## 2024-10-28 NOTE — PROGRESS NOTES
Physical Therapy    Physical Therapy Treatment    Patient Name: Jahaira Moore  MRN: [id number]  Department: [facility] RE[facility]  Room: [address] [place] [address]  Today's Date: 10/28/2024  Time Calculation  Start Time: 0915  Stop Time: 1000  Time Calculation (min): 45 min      Assessment/Plan   PT Assessment  End of Session Patient Position: Up in chair, Alarm on     PT Plan  Treatment/Interventions: Bed mobility, Transfer training, Gait training, Stair training, Balance training, Strengthening, Endurance training, Therapeutic exercise, Therapeutic activity, Neuromuscular re-education  PT Plan: Ongoing PT  PT Frequency: 5 times per week (6x prn)  PT Discharge Recommendations:  (Anticipate discharge home with the need for intermittent supervision and assist with higher level mobility tasks and due to impulsivity and decreased safety awareness.)  Equipment Recommended upon Discharge: Wheeled walker  PT Recommended Transfer Status: Assist x1      General Visit Information:   PT  Visit  PT Received On: 10/28/24  General  Prior to Session Communication:  (OT)  Patient Position Received: Up in chair, Alarm on  General Comment:  (Pt pleasant and cooperative, no complaints at this time.)    Subjective   Precautions:  Precautions  Medical Precautions: Fall precautions      Objective   Pain:  Pain Assessment  Pain Assessment: 0-10  0-10 (Numeric) Pain Score: 0 - No pain    Treatments:  Bed Mobility  Bed Mobility:  (Pt performs sit/supine/sit with mod I.)    Ambulation/Gait Training  Ambulation/Gait Training Performed:  (Pt ambulates 200 ft, 200 ft, 300 ft with FWW with CGA/SBA.)  Transfers  Transfer:  (Pt performs stand pivot transfers from/to w/c to/from mat table with SBA.)  Transfer 1  Trials/Comments 1:  (Pt performs sit/stand transfers with supervision.)    Stairs  Stairs:  (Pt ascends/descends 5 steps x 3 with 1 HR and CGA, cues for safety throughout.)     Object From Floor  Comments:  (Pt ambulates 50 ft and picks up 2 rings from floor with  use of reacher all with SBA, cues for safety.)    Wheelchair Activities  Propulsion:  (Pt propels w/c 200 ft with BLE with mod I.)      Education Documentation  Pt educated on techniques for transfers and gait training with device in order to maximize safety and independence.  Pt educated on stair training techniques including proper sequencing to maximize safety.      Encounter Problems       Encounter Problems (Active)       PT Problem       STG - Bed mobility with supervision (Met)       Start:  10/19/24    Expected End:  10/24/24    Resolved:  10/23/24         STG - Transfers with CGA (Progressing)       Start:  10/19/24    Expected End:  10/24/24            STG - Pt will amb 100 ft with wheeled walker and min/mod assist x1. (Met)       Start:  10/19/24    Expected End:  10/24/24    Resolved:  10/23/24         STG - Pt will negotiate 2 stairs with rails and min assist x2. (Met)       Start:  10/19/24    Expected End:  10/24/24    Resolved:  10/23/24         LTG - Mod independent bed mobility. (Progressing)       Start:  10/19/24    Expected End:  10/29/24            LTG - Mod independent transfers (Progressing)       Start:  10/19/24    Expected End:  10/29/24            LTG - Pt will amb 150 ft with wheeled walker and SBA (Progressing)       Start:  10/19/24    Expected End:  10/29/24            LTG - Pt will negotiate 5 stairs with rails and min assist x1. (Progressing)       Start:  10/19/24    Expected End:  10/29/24

## 2024-10-28 NOTE — PROGRESS NOTES
"Nutrition Follow Up Assessment:     Nutrition History:  Energy Intake: Good > 75 %  Food and Nutrient History: Pt sitting in chair in room at time of visit today.  Pt eating well and eating Magic cup at lunch.  No complaints voiced.   Last BM 10/27. Na+ 141, -112  Vitamin/Herbal Supplement Use: calcium + D3, vitamin D3, per home med list  Food Allergies/Intolerances:  None  GI Symptoms: None  Oral Problems: None       Anthropometrics:  Height: 160 cm (5' 3\")   Weight: 61.2 kg (135 lb)   BMI (Calculated): 23.92  IBW/kg (Dietitian Calculated): 52 kg  Percent of IBW: 117 %       Weight History:     Weight Change %:   No new weight to assess     Nutrition Focused Physical Exam Findings:  defer: completed 10/21/24  Subcutaneous Fat Loss:      Muscle Wasting:     Edema:  Edema: none  Physical Findings:  Skin: Negative    Nutrition Significant Labs:  BMP Trend:   Results from last 7 days   Lab Units 10/26/24  0729 10/22/24  0022   GLUCOSE mg/dL 92 98   CALCIUM mg/dL 9.3 9.2   SODIUM mmol/L 141 139   POTASSIUM mmol/L 4.0 4.1   CO2 mmol/L 28 28   CHLORIDE mmol/L 107 103   BUN mg/dL 28* 34*   CREATININE mg/dL 1.29* 1.40*    , BG POCT trend:        Nutrition Specific Medications:  Reviewed     I/O:   Last BM Date: 10/27/24 (Per patient via shift report); Stool Appearance: Unable to assess (10/26/24 1320)    Dietary Orders (From admission, onward)       Start     Ordered    10/21/24 1823  Oral nutritional supplements  Until discontinued        Question Answer Comment   Deliver with Lunch    Select supplement: Magic Cup        10/21/24 1822    10/19/24 1718  May Participate in Room Service  ( ROOM SERVICE MAY PARTICIPATE)  Once        Question:  .  Answer:  Yes    10/19/24 1717    10/18/24 1907  Adult diet Regular  Diet effective now        Question:  Diet type  Answer:  Regular    10/18/24 1907                     Estimated Needs:      Method for Estimating Needs: 7317-5773  26-30 nancy kg of IBW     Method for Estimating " Needs: 52-62  1-1.2 gm kg of IBW     Method for Estimating Needs: 3158-0242  20-30 ml kg of IBW as medically indicated.        Nutrition Diagnosis   Malnutrition Diagnosis  Patient has Malnutrition Diagnosis: Yes  Diagnosis Status: Ongoing  Malnutrition Diagnosis: Moderate malnutrition related to chronic disease or condition  As Evidenced by: <75% energy intake compared to estimated needs for > 1 month.  >10% wt loss in 6 months  Additional Assessment Information: Pt continues to eat well            Nutrition Interventions/Recommendations         Nutrition Prescription:  Individualized Nutrition Prescription Provided for : Regular diet with Magic cup once daily        Nutrition Interventions:   Interventions: Meals and snacks, Medical food supplement  Meals and Snacks: General healthful diet  Goal: consume >75% of meals--met/ongoing  Medical Food Supplement: Commercial food  Goal: consume >75% of Magic cup once daily (for an additional 290 kcals, 9 gm protein each)-met/ongoing    Collaboration and Referral of Nutrition Care:  (spoke with pt)    Nutrition Education:   Pt denied any need for education       Nutrition Monitoring and Evaluation   Food/Nutrient Related History Monitoring  Monitoring and Evaluation Plan: Energy intake, Fluid intake, Amount of food  Energy Intake: Estimated energy intake  Criteria: Meal/ONS intake to meet >75% of estimated needs  Fluid Intake: Estimated fluid intake  Criteria: fluid intake to meet >75% of estimated need  Amount of Food: Estimated amout of food, Medical food intake  Criteria: Pt to consume >75% of meals/ONS    Body Composition/Growth/Weight History  Monitoring and Evaluation Plan: Weight  Weight: Measured weight  Criteria: maintain stable weight    Biochemical Data, Medical Tests and Procedures  Monitoring and Evaluation Plan: Electrolyte/renal panel, Glucose/endocrine profile  Criteria: labs WNL  Criteria: BG within acceptable range    Nutrition Focused Physical  Findings  Criteria: BM at least every 2-3 days         Time Spent (min): 30 minutes

## 2024-10-28 NOTE — CARE PLAN
The patient's goals for the shift include      The clinical goals for the shift include Remain hemodynamically stable, get some quality rest for PT/OT in the AM.      Problem: Pain - Adult  Goal: Verbalizes/displays adequate comfort level or baseline comfort level  Outcome: Progressing     Problem: Discharge Planning  Goal: Discharge to home or other facility with appropriate resources  Outcome: Progressing     Problem: Chronic Conditions and Co-morbidities  Goal: Patient's chronic conditions and co-morbidity symptoms are monitored and maintained or improved  Outcome: Progressing     Problem: Pain  Goal: Takes deep breaths with improved pain control throughout the shift  Outcome: Progressing  Goal: Turns in bed with improved pain control throughout the shift  Outcome: Progressing  Goal: Walks with improved pain control throughout the shift  Outcome: Progressing  Goal: Performs ADL's with improved pain control throughout shift  Outcome: Progressing  Goal: Participates in PT with improved pain control throughout the shift  Outcome: Progressing     Problem: Skin  Goal: Decreased wound size/increased tissue granulation at next dressing change  Outcome: Progressing  Flowsheets (Taken 10/27/2024 2247)  Decreased wound size/increased tissue granulation at next dressing change: Promote sleep for wound healing  Goal: Participates in plan/prevention/treatment measures  Outcome: Progressing  Flowsheets (Taken 10/27/2024 2247)  Participates in plan/prevention/treatment measures: Elevate heels  Goal: Promote/optimize nutrition  Outcome: Progressing  Flowsheets (Taken 10/27/2024 2247)  Promote/optimize nutrition: Monitor/record intake including meals  Goal: Promote skin healing  Outcome: Progressing  Flowsheets (Taken 10/27/2024 2247)  Promote skin healing:   Assess skin/pad under line(s)/device(s)   Protective dressings over bony prominences

## 2024-10-28 NOTE — PROGRESS NOTES
Occupational Therapy    OT Treatment    Patient Name: Jaahira Moore  MRN: [id number]  Department: [facility]  Room: [address] [place] [address]  Today's Date: 10/28/2024  Time Calculation  Start Time: 0745  Stop Time: 0915  Time Calculation (min): 90 min        Assessment:  End of Session Communication:  (to PT)  End of Session Patient Position: Up in chair, Alarm on     Plan:  Treatment Interventions: ADL retraining, Endurance training, Patient/family training, Neuromuscular reeducation, Functional transfer training  OT Frequency: 90 min/5 days per week (x 10 days)  OT Discharge Recommendations:  (anticipate need for initial supervision with ADLs, transfers, and mobility secondary to decreased safety awareness)  Equipment Recommended upon Discharge:  (3-in-1 commode, wheeled walker, tub seat, reacher)  Treatment Interventions: ADL retraining, Endurance training, Patient/family training, Neuromuscular reeducation, Functional transfer training    Subjective   Previous Visit Info:  OT Last Visit  OT Received On: 10/28/24  General:  General  Family/Caregiver Present: No  Prior to Session Communication: Bedside nurse  Patient Position Received: Up in chair, Alarm on  Precautions:  Medical Precautions: Fall precautions            Pain:  Pain Assessment  Pain Assessment: 0-10  0-10 (Numeric) Pain Score: 5 - Moderate pain  Pain Location:  (headache, rt wrist)  Pain Interventions: Medication (See MAR)    Objective      Functional Standing Tolerance:  Time: 2:30 min during folding laundry task, sba/cga  Bed Mobility/Transfers: Bed Mobility  Bed Mobility:  (indep supine to/from sit)    Transfers  Transfer:  (multiple sit to stand transfers sba, pivot from wc/bed to/from commode via wwalker sba, occas hand placement cues; educating on wc positioning to bed and commode, placement of commode by bed at night.)      Functional Mobility:  Functional Mobility  Functional Mobility Performed:  (wc level item retrieval from counter ht and floor using reacher  distally supervision to Mod I, transporting using bag)  Functional Mobility 1  Comments 1: functional mobility via wwalker during item retrieval from counter ht and floow using reacher cga to occas min a for safety, mod cues for walker techs/approaching items/reaching safely.       IADL's: Light Housekeeping  Light Housekeeping Level of Assistance:  (distal supervision at wc level for gathering laudry, transporting using bag and then basket, folding laundry at table in sitting. During standing folding required sba/cga.)    Therapy/Activity: Balance/Neuromuscular Re-Education  Balance/Neuromuscular Re-Education Activity 1: completes bilat ue ther ex program with 1# weights 10 x3 sets, 9 planes following written and illustrated HEP after initial instructions of proper techs. to promote indep in adl, transfers, mobility        EDUCATION:  Education  Individual(s) Educated: Patient  Education Provided:  (goals of session, walker safety techs, reacher use for safe retrieval, home going recommendations, HEP)  Patient Response to Education: Patient/Caregiver Verbalized Understanding of Information    Goals:  Encounter Problems       Encounter Problems (Active)       OT Problem       LTG - Patient will complete grooming with set up. (Met)       Start:  10/19/24    Expected End:  10/29/24    Resolved:  10/23/24         LTG - Patient will complete toileting with supervision. (Progressing)       Start:  10/19/24    Expected End:  10/29/24            LTG - Patient will complete bathing with supervision. (Met)       Start:  10/19/24    Expected End:  10/29/24    Resolved:  10/23/24         LTG - Patient will complete UE dressing with set up. (Met)       Start:  10/19/24    Expected End:  10/29/24    Resolved:  10/23/24         LTG - Patient will complete LE dressing using adaptive equip as needed with supervision. (Progressing)       Start:  10/19/24    Expected End:  10/29/24            LTG - Patient will complete commode  transfer via device as needed with supervision. (Progressing)       Start:  10/19/24    Expected End:  10/29/24            LTG - Patient will complete tub/shower transfer using DME as needed with sba. (Progressing)       Start:  10/19/24    Expected End:  10/29/24            LTG - Patient will complete functional mobility via device as needed with s/sba. (Progressing)       Start:  10/19/24    Expected End:  10/29/24            LTG - Patient will complete light meal prep or kitchen access with sba. (Progressing)       Start:  10/19/24    Expected End:  10/29/24            STG - Patient will complete toileting with cga. (Met)       Start:  10/19/24    Expected End:  10/26/24    Resolved:  10/23/24         STG - Patient will complete bathing with sba. (Met)       Start:  10/19/24    Expected End:  10/26/24    Resolved:  10/23/24         STG - Patient will complete LE dressing using adaptive equip as needed with cga. (Met)       Start:  10/19/24    Expected End:  10/26/24    Resolved:  10/23/24         STG - Patient will complete commode transfer via device as needed with cga. (Met)       Start:  10/19/24    Expected End:  10/26/24    Resolved:  10/23/24         STG - Patient will complete tub/shower transfer using DME as needed with min assist. (Met)       Start:  10/19/24    Expected End:  10/26/24    Resolved:  10/23/24         STG - Patient will complete functional mobility via device as needed with min assist. (Met)       Start:  10/19/24    Expected End:  10/26/24    Resolved:  10/23/24

## 2024-10-29 LAB
ALBUMIN SERPL BCP-MCNC: 3.9 G/DL (ref 3.4–5)
ANION GAP SERPL CALC-SCNC: 14 MMOL/L (ref 10–20)
BUN SERPL-MCNC: 27 MG/DL (ref 6–23)
CALCIUM SERPL-MCNC: 9.4 MG/DL (ref 8.6–10.3)
CHLORIDE SERPL-SCNC: 106 MMOL/L (ref 98–107)
CO2 SERPL-SCNC: 25 MMOL/L (ref 21–32)
CREAT SERPL-MCNC: 1.23 MG/DL (ref 0.5–1.05)
EGFRCR SERPLBLD CKD-EPI 2021: 47 ML/MIN/1.73M*2
ERYTHROCYTE [DISTWIDTH] IN BLOOD BY AUTOMATED COUNT: 12.9 % (ref 11.5–14.5)
GLUCOSE SERPL-MCNC: 91 MG/DL (ref 74–99)
HCT VFR BLD AUTO: 37.3 % (ref 36–46)
HGB BLD-MCNC: 11.8 G/DL (ref 12–16)
MCH RBC QN AUTO: 32.7 PG (ref 26–34)
MCHC RBC AUTO-ENTMCNC: 31.6 G/DL (ref 32–36)
MCV RBC AUTO: 103 FL (ref 80–100)
NRBC BLD-RTO: 0 /100 WBCS (ref 0–0)
PHOSPHATE SERPL-MCNC: 4 MG/DL (ref 2.5–4.9)
PLATELET # BLD AUTO: 329 X10*3/UL (ref 150–450)
POTASSIUM SERPL-SCNC: 4 MMOL/L (ref 3.5–5.3)
RBC # BLD AUTO: 3.61 X10*6/UL (ref 4–5.2)
SODIUM SERPL-SCNC: 141 MMOL/L (ref 136–145)
WBC # BLD AUTO: 7.3 X10*3/UL (ref 4.4–11.3)

## 2024-10-29 PROCEDURE — 97530 THERAPEUTIC ACTIVITIES: CPT | Mod: GO

## 2024-10-29 PROCEDURE — 97535 SELF CARE MNGMENT TRAINING: CPT | Mod: GO

## 2024-10-29 PROCEDURE — 85027 COMPLETE CBC AUTOMATED: CPT

## 2024-10-29 PROCEDURE — 97116 GAIT TRAINING THERAPY: CPT | Mod: GP

## 2024-10-29 PROCEDURE — 1180000001 HC REHAB PRIVATE ROOM DAILY

## 2024-10-29 PROCEDURE — 2500000001 HC RX 250 WO HCPCS SELF ADMINISTERED DRUGS (ALT 637 FOR MEDICARE OP)

## 2024-10-29 PROCEDURE — 97112 NEUROMUSCULAR REEDUCATION: CPT | Mod: GO

## 2024-10-29 PROCEDURE — 97530 THERAPEUTIC ACTIVITIES: CPT | Mod: GP

## 2024-10-29 PROCEDURE — 2500000002 HC RX 250 W HCPCS SELF ADMINISTERED DRUGS (ALT 637 FOR MEDICARE OP, ALT 636 FOR OP/ED)

## 2024-10-29 PROCEDURE — 80069 RENAL FUNCTION PANEL: CPT

## 2024-10-29 PROCEDURE — 2500000004 HC RX 250 GENERAL PHARMACY W/ HCPCS (ALT 636 FOR OP/ED)

## 2024-10-29 PROCEDURE — 36415 COLL VENOUS BLD VENIPUNCTURE: CPT

## 2024-10-29 PROCEDURE — 99232 SBSQ HOSP IP/OBS MODERATE 35: CPT

## 2024-10-29 ASSESSMENT — PAIN - FUNCTIONAL ASSESSMENT
PAIN_FUNCTIONAL_ASSESSMENT: 0-10

## 2024-10-29 ASSESSMENT — COGNITIVE AND FUNCTIONAL STATUS - GENERAL
DAILY ACTIVITIY SCORE: 24
MOBILITY SCORE: 24

## 2024-10-29 ASSESSMENT — ACTIVITIES OF DAILY LIVING (ADL)
HOME_MANAGEMENT_TIME_ENTRY: 10
HOME_MANAGEMENT_TIME_ENTRY: 25

## 2024-10-29 ASSESSMENT — PAIN SCALES - WONG BAKER: WONGBAKER_NUMERICALRESPONSE: HURTS WHOLE LOT

## 2024-10-29 ASSESSMENT — PAIN SCALES - GENERAL
PAINLEVEL_OUTOF10: 6
PAINLEVEL_OUTOF10: 5 - MODERATE PAIN
PAINLEVEL_OUTOF10: 0 - NO PAIN
PAINLEVEL_OUTOF10: 4
PAINLEVEL_OUTOF10: 0 - NO PAIN
PAINLEVEL_OUTOF10: 0 - NO PAIN
PAINLEVEL_OUTOF10: 8

## 2024-10-29 ASSESSMENT — PAIN DESCRIPTION - LOCATION
LOCATION: FACE
LOCATION: HEAD
LOCATION: WRIST

## 2024-10-29 ASSESSMENT — PAIN DESCRIPTION - DESCRIPTORS: DESCRIPTORS: DISCOMFORT

## 2024-10-29 NOTE — PROGRESS NOTES
"  Jahaira Moore is a 69 y.o. female on day 11 of admission presenting with Cerebrovascular accident (CVA) due to occlusion of right vertebral artery (Multi).    Subjective   The patient was seen at bedside and therapy. No acute events overnight. Continues to have intermittent headaches that improve with Tylenol. The patient feels like they are making improvements in therapy and are tolerating it without complication.  States that pain is well managed with PRN Tylenol and voltaren gel.  Denies muscle spasms and stiffness. Denies dysuria. Having regular BM.  Patient took PRN tizanidine for back pain.         Objective       Physical Exam   Constitutional: Appears stated age. In NAD.   HEENT: NC/AT, EOMI, clear sclera, moist mucous membranes  CV: RRR, No M/R/G  PULM: CTAB, no coughing or wheezing  ABDOMEN: Soft, NT/ND. No TTP.  SKIN: Normal Color, Warm, Dry, No Rashes   EXTREMITIES: Non-Tender, Full ROM, No Pedal Edema  NEURO: A&O x 4, No gross motor deficits,RLE ataxia  PSYCH: Normal Mood & Behavior    Function: Contact-guard assist to standby assist.    Assessment/Plan        Last Recorded Vitals  Blood pressure 156/83, pulse 66, temperature 36.2 °C (97.2 °F), resp. rate 16, height 1.6 m (5' 3\"), weight 61.2 kg (135 lb), SpO2 96%.    Therapy notes from last 24 hours reviewed.    Relevant Results                  Scheduled medications  aspirin, 81 mg, oral, Daily  atorvastatin, 40 mg, oral, Nightly  buPROPion XL, 300 mg, oral, Daily  cholecalciferol, 1,000 Units, oral, Daily  clopidogrel, 75 mg, oral, Daily  DULoxetine, 90 mg, oral, Daily  gabapentin, 300 mg, oral, TID  pantoprazole, 40 mg, oral, Daily  predniSONE, 1 mg, oral, Daily  predniSONE, 5 mg, oral, Daily  traZODone, 50 mg, oral, Nightly      Continuous medications     PRN medications  PRN medications: acetaminophen, alum-mag hydroxide-simeth, diclofenac sodium, melatonin, sennosides-docusate sodium, tiZANidine     Results for orders placed or performed during the " hospital encounter of 10/18/24 (from the past 24 hours)   CBC   Result Value Ref Range    WBC 7.3 4.4 - 11.3 x10*3/uL    nRBC 0.0 0.0 - 0.0 /100 WBCs    RBC 3.61 (L) 4.00 - 5.20 x10*6/uL    Hemoglobin 11.8 (L) 12.0 - 16.0 g/dL    Hematocrit 37.3 36.0 - 46.0 %     (H) 80 - 100 fL    MCH 32.7 26.0 - 34.0 pg    MCHC 31.6 (L) 32.0 - 36.0 g/dL    RDW 12.9 11.5 - 14.5 %    Platelets 329 150 - 450 x10*3/uL   Renal function panel   Result Value Ref Range    Glucose 91 74 - 99 mg/dL    Sodium 141 136 - 145 mmol/L    Potassium 4.0 3.5 - 5.3 mmol/L    Chloride 106 98 - 107 mmol/L    Bicarbonate 25 21 - 32 mmol/L    Anion Gap 14 10 - 20 mmol/L    Urea Nitrogen 27 (H) 6 - 23 mg/dL    Creatinine 1.23 (H) 0.50 - 1.05 mg/dL    eGFR 47 (L) >60 mL/min/1.73m*2    Calcium 9.4 8.6 - 10.3 mg/dL    Phosphorus 4.0 2.5 - 4.9 mg/dL    Albumin 3.9 3.4 - 5.0 g/dL                   Assessment/Plan   Principal Problem:    Cerebrovascular accident (CVA) due to occlusion of right vertebral artery (Multi)  Active Problems:    Ataxia    Chronic renal insufficiency      10/26  -  1.  CVA  Aspirin 81 mg daily  Lipitor 40 mg daily  Plavix 75 mg daily  Begin physical therapy for  transfers, bed mobility, lower extremity strengthening and coordination, gait training with an assistive device  Begin occupational therapy for ADL retraining and ADL activities, transfers, exercises to improve her coordination     2.  Hyperlipidemia  Lipitor 40 mg daily     3.  Rheumatoid arthritis   prednisone 6 mg and 1 mg daily     4.  History of depression  Wellbutrin 300 mg monitor for adverse mood changes  Cymbalta 90 mg daily  Trazodone 50 mg at at bedtime  Neurontin 300 mg 3 times a day     5.  DVT prophylaxis  Lovenox 40 mg every 24 hours     6.  GI protectant  Protonix 40 mg daily     7.  Elevated BUN to creatinine  Replete blood work, encourage fluids     8. FENGI  She is on DVT prophylaxis with Lovenox  She is on GI protectant with Protonix  She is  constipated and is a bowel program has been started  She is a fall risk fall precautions are being utilized       10/29  -Continues to make improvements in ambulation and stairs with therapy. Currently ambulation 200 feet with FWW at level CG. Continue therapy  -Monitor dizziness caused by tizanidine.  Do not take when trying to ambulate as this may increase risk of falls.  -Discussed headache management in relation to stroke recovery. Will continue to monitor.  -Continue aspirin and Plavix for 3 months from date of stroke  -Discharge planning.       I spent 35 minutes in the professional and overall care of this patient.      Sebastian Greene, DO  PM&R  PGY-2

## 2024-10-29 NOTE — PROGRESS NOTES
Physical Therapy    Physical Therapy Treatment    Patient Name: Jahaira Moore  MRN: [id number]  Department: [facility]  Room: [address] [place] [address]  Today's Date: 10/29/2024  Time Calculation  Start Time: 0915  Stop Time: 1045  Time Calculation (min): 90 min         Assessment/Plan   PT Assessment  End of Session Communication:  (OT)  End of Session Patient Position: Up in chair, Alarm on     PT Plan  Treatment/Interventions: Bed mobility, Transfer training, Gait training, Stair training, Balance training, Strengthening, Endurance training, Therapeutic exercise, Therapeutic activity, Neuromuscular re-education  PT Plan: Ongoing PT  PT Frequency: 5 times per week (6x prn)  PT Discharge Recommendations:  (Anticipate discharge home with the need for intermittent supervision and assist with higher level mobility tasks and due to impulsivity and decreased safety awareness.)  Equipment Recommended upon Discharge: Wheeled walker  PT Recommended Transfer Status: Assist x1      General Visit Information:   PT  Visit  PT Received On: 10/29/24  General  Family/Caregiver Present: Yes (Pt's sister Hortencia present from 9:15-10:00a for PT session. Participates in hands on assist for ambulation and stair negotiation in preparation for home going.)  Prior to Session Communication:  (OT)  Patient Position Received: Up in chair, Alarm on  General Comment: Pt pleasant and cooperative.  Feels ready for upcoming discharge.  No concerns or questions at this time.    Subjective   Precautions:  Precautions  Medical Precautions: Fall precautions            Objective   Pain:  Pain Assessment  Pain Assessment:  (Pt reports slight headache but does not rate.)  0-10 (Numeric) Pain Score: 0 - No pain    Treatments:  Therapeutic Exercise  Therapeutic Exercise Performed:  (Discussed continued seated ther ex for HEP after discharge, pt does not want handout for exercises reports she can remember them.)    Bed Mobility  Bed Mobility:  (Pt performs sit/supine/sit with  mod I.)    Ambulation/Gait Training  Ambulation/Gait Training Performed:  (Pt ambulates 200 ft, 200 ft, 200 ft with FWW and CGA.  Sister participates in hands on guarding of pt during ambulation.  Pt ambulates 200 ft without AD with min A x 1.)  Transfers  Transfer:  (Pt performs sit/stand transfers with supervision.  Pt performs stand pivot transfers x 6 throughout session with supervision.  Pt requires cues at time for set up, best positioning of w/c for safety to make <90 degree pivot vs. 180 degree turn to chair.)    Stairs  Stairs:  (Pt ascends/descends 5 steps x 2 with 1 HR and CGA.  Pt ascends/descends full flight with 1 HR and CGA.  Cues for slowed pace and for step to approach throughout for safety.)    Wheelchair Activities  Propulsion:  (Pt propels w/c 200 ft with BLE over tile, carpet and thresholds with mod I.)      Education Documentation  Family teaching completed with sister. Sister educated on safety recommendations as well as safe mobility techniques related to chair transfers, gait, and stairs, as well as follow up therapy. Sister appears to have good understanding of pt's needs at discharge.      Encounter Problems       Encounter Problems (Active)       PT Problem       STG - Bed mobility with supervision (Met)       Start:  10/19/24    Expected End:  10/24/24    Resolved:  10/23/24         STG - Transfers with CGA (Progressing)       Start:  10/19/24    Expected End:  10/24/24            STG - Pt will amb 100 ft with wheeled walker and min/mod assist x1. (Met)       Start:  10/19/24    Expected End:  10/24/24    Resolved:  10/23/24         STG - Pt will negotiate 2 stairs with rails and min assist x2. (Met)       Start:  10/19/24    Expected End:  10/24/24    Resolved:  10/23/24         LTG - Mod independent bed mobility. (Progressing)       Start:  10/19/24    Expected End:  10/29/24            LTG - Mod independent transfers (Progressing)       Start:  10/19/24    Expected End:  10/29/24             LTG - Pt will amb 150 ft with wheeled walker and SBA (Progressing)       Start:  10/19/24    Expected End:  10/29/24            LTG - Pt will negotiate 5 stairs with rails and min assist x1. (Progressing)       Start:  10/19/24    Expected End:  10/29/24

## 2024-10-29 NOTE — CARE PLAN
The patient's goals for the shift include  successful therapy    The clinical goals for the shift include remain HD stable

## 2024-10-29 NOTE — PROGRESS NOTES
Occupational Therapy    OT Treatment    Patient Name: Jahaira Moore  MRN: [id number]  Department: [facility] RE[facility]  Room: [address] [place], [place] [address]  Today's Date: 10/29/2024  Time Calculation  Start Time: 1045  Stop Time: 1130  Time Calculation (min): 45 min    Lamar Cornell OTR/L supervised and approves Maura Osorio S/OT documentation and treatment of this patient.          Assessment:  End of Session Communication: Bedside nurse  End of Session Patient Position: Up in chair, Alarm on     Plan:  Treatment Interventions: ADL retraining, Endurance training, Patient/family training, Neuromuscular reeducation, Functional transfer training  OT Frequency: 90 min/5 days per week (x 10 days)  OT Discharge Recommendations:  (anticipate need for initial supervision with ADLs, transfers, and mobility secondary to decreased safety awareness)  Equipment Recommended upon Discharge:  (3-in-1 commode, wheeled walker, tub seat, reacher)  Treatment Interventions: ADL retraining, Endurance training, Patient/family training, Neuromuscular reeducation, Functional transfer training    Subjective   Previous Visit Info:  OT Last Visit  OT Received On: 10/29/24  General:  Prior to Session Communication:  (Handoff from PT)  Patient Position Received: Up in chair, Alarm on  General Comment:  (Patient ready for discharge and pleasant during session.)    Precautions:  Medical Precautions: Fall precautions      Pain:  Pain Assessment  Pain Assessment: 0-10  0-10 (Numeric) Pain Score: 0 - No pain    Objective    Functional Standing Tolerance:   Patient completed table top standing with bilateral support using table or wheel for 15 minutes with no LOB or signs of dyspnea.     Bed Mobility/Transfers: Transfers  Transfer:  (Patient performs sit<>stand from wheelchair with Mod I; completes stand pivot from wheelchair to couch with supervision up to SBA; verbal cues for hand positioning and pacing to increase safety for ADLs)      Functional Mobility:  Functional  Mobility  Functional Mobility Performed:  (Independent with functional mobility in wheelchair including navigating tight corners and environmental barriers)      Kitchen Mobility  Kitchen Mobility Comments:  (Simulated wheelchair kitchen mobility at wheelchair level maneuvering through tight spaces and navigating corners independently;)    Light Housekeeping  Light Housekeeping:  (Completed light meal prep task in kitchen; retriving water and cup; including reaching above shoulder height with unilateral support, using calibration to fill cup up, and safely transitioning with cup in wheelchair with supervision)    Light Housekeeping Level:  (patient cued for sitting during item retrival and placing objects counter height; sitting during meal prep as needed; and adaptive techniques)       Therapy/Activity:    Therapeutic Activity  Therapeutic Activity Performed:  (Completed standing activity with bilateral wheel at table top and rolled out maximal resistant putty to increase activity tolerance required for ADLs; patient completed reaching outside LAURA at tabletop in standing by placing PEGs into putty utilizing fine motor coordination     OP EDUCATION:  Education  Individual(s) Educated: Patient  Education Provided:  (compensatory strategies, adaptive techniques, safety training during functional transfers and mobility, energy conservation strategies)  Patient Response to Education: Patient/Caregiver Verbalized Understanding of Information    Goals:  Encounter Problems       Encounter Problems (Active)       OT Problem       LTG - Patient will complete grooming with set up. (Met)       Start:  10/19/24    Expected End:  10/29/24    Resolved:  10/23/24         LTG - Patient will complete toileting with supervision. (Progressing)       Start:  10/19/24    Expected End:  10/29/24            LTG - Patient will complete bathing with supervision. (Met)       Start:  10/19/24    Expected End:  10/29/24    Resolved:  10/23/24          LTG - Patient will complete UE dressing with set up. (Met)       Start:  10/19/24    Expected End:  10/29/24    Resolved:  10/23/24         LTG - Patient will complete LE dressing using adaptive equip as needed with supervision. (Progressing)       Start:  10/19/24    Expected End:  10/29/24            LTG - Patient will complete commode transfer via device as needed with supervision. (Progressing)       Start:  10/19/24    Expected End:  10/29/24            LTG - Patient will complete tub/shower transfer using DME as needed with sba. (Progressing)       Start:  10/19/24    Expected End:  10/29/24            LTG - Patient will complete functional mobility via device as needed with s/sba. (Progressing)       Start:  10/19/24    Expected End:  10/29/24            LTG - Patient will complete light meal prep or kitchen access with sba. (Progressing)       Start:  10/19/24    Expected End:  10/29/24            STG - Patient will complete toileting with cga. (Met)       Start:  10/19/24    Expected End:  10/26/24    Resolved:  10/23/24         STG - Patient will complete bathing with sba. (Met)       Start:  10/19/24    Expected End:  10/26/24    Resolved:  10/23/24         STG - Patient will complete LE dressing using adaptive equip as needed with cga. (Met)       Start:  10/19/24    Expected End:  10/26/24    Resolved:  10/23/24         STG - Patient will complete commode transfer via device as needed with cga. (Met)       Start:  10/19/24    Expected End:  10/26/24    Resolved:  10/23/24         STG - Patient will complete tub/shower transfer using DME as needed with min assist. (Met)       Start:  10/19/24    Expected End:  10/26/24    Resolved:  10/23/24         STG - Patient will complete functional mobility via device as needed with min assist. (Met)       Start:  10/19/24    Expected End:  10/26/24    Resolved:  10/23/24

## 2024-10-29 NOTE — PROGRESS NOTES
Occupational Therapy    OT Treatment    Patient Name: Jahaira Moore  MRN: [id number]  Department: [facility] RE[facility]  Room: [address] [place] [address]  Today's Date: 10/29/2024  Time Calculation  Start Time: 0830  Stop Time: 0915  Time Calculation (min): 45 min      Lamar Cornell OTR/L supervised and approves Maura Osorio S/OT documentation and treatment of this patient.    Assessment:  End of Session Communication:  (to PT)  End of Session Patient Position: Up in chair, Alarm on  Plan:  Treatment Interventions: ADL retraining, Endurance training, Patient/family training, Neuromuscular reeducation, Functional transfer training  OT Frequency: 90 min/5 days per week (x 10 days)  OT Discharge Recommendations:  (anticipate need for initial supervision with ADLs, transfers, and mobility secondary to decreased safety awareness)  Equipment Recommended upon Discharge:  (3-in-1 commode, wheeled walker, tub seat, reacher)  Treatment Interventions: ADL retraining, Endurance training, Patient/family training, Neuromuscular reeducation, Functional transfer training    Subjective   Previous Visit Info:  OT Last Visit  OT Received On: 10/29/24  General:  General  Family/Caregiver Present: Yes  Caregiver Feedback:  (Sister present for entire session)  General Comment:  (Patient motivated to go home; continues to require cues for safety during stand-pivot transfers. Sister repored patient will have family support at discharge and will have daily supervision for ADLs and functional mobility.)  Precautions:   Falls    Pain:  Pain Assessment  Pain Assessment: 0-10  0-10 (Numeric) Pain Score: 0 - No pain    Objective    Activities of Daily Living: Grooming  Grooming Comments:  (SImulated home grooming set up in kitchen with SUP during standing)    UE Dressing  UE Dressing Comments:  (Completed UE dressing jerry/doff shirt MI at wheelchair level)    Toileting  Toileting Comments: Completed toileting; pericare independent while sitting; SUP for clothing  management.    Bed Mobility/Transfers: Transfers  Transfer:  (Sit to stand from wheelchair with MI; stand-pivot transfers with SUP up to SBA with verbal cues for safe hand positioning and body placement)    Toilet Transfers  Toilet Transfers Comments:  (Complete stand pivot in BR with SBA with noted decreased safety awareness for safe hand placement and technique; moderate verbal cues required; Patient and sister were reccomended 3:1 commode for increased safety and independence; patient resistant)  Shower Transfers  Shower Transfers Comments:  (Complete shower chair transfer with simulated use of grab bar with SBA for safety and stability with verbal cues for technique and hand position)      Functional Mobility:  Functional Mobility  Functional Mobility Performed:  (Completed functional mobility at wheelchair level independently; functional mobility with wheeled walker with SBA for safety; pt demonstrates x1 LOB with self recovery due to scissor mobility pattern)    Kitchen Mobility  Kitchen Mobility Comments:  (Simulated wheelchair kitchen mobility at wheelchair level maneuvering through tight spaces and navigating corners independently;)    Light Housekeeping  Light Housekeeping:  (Completed closet access at wheelchair level bending, reaching, and hanging shirt up while standing with SUP unsuppported;)    OP EDUCATION:  Education  Individual(s) Educated: Patient (Sister)  Education Provided:  (Adaptive equipment, home reccomendations, safety during functional transfers and mobility, ADL training)  Patient Response to Education:  (Patient and sister verbalized understanding of information and training)    Goals:  Encounter Problems       Encounter Problems (Active)       OT Problem       LTG - Patient will complete grooming with set up. (Met)       Start:  10/19/24    Expected End:  10/29/24    Resolved:  10/23/24         LTG - Patient will complete toileting with supervision. (Progressing)       Start:  10/19/24     Expected End:  10/29/24            LTG - Patient will complete bathing with supervision. (Met)       Start:  10/19/24    Expected End:  10/29/24    Resolved:  10/23/24         LTG - Patient will complete UE dressing with set up. (Met)       Start:  10/19/24    Expected End:  10/29/24    Resolved:  10/23/24         LTG - Patient will complete LE dressing using adaptive equip as needed with supervision. (Progressing)       Start:  10/19/24    Expected End:  10/29/24            LTG - Patient will complete commode transfer via device as needed with supervision. (Progressing)       Start:  10/19/24    Expected End:  10/29/24            LTG - Patient will complete tub/shower transfer using DME as needed with sba. (Progressing)       Start:  10/19/24    Expected End:  10/29/24            LTG - Patient will complete functional mobility via device as needed with s/sba. (Progressing)       Start:  10/19/24    Expected End:  10/29/24            LTG - Patient will complete light meal prep or kitchen access with sba. (Progressing)       Start:  10/19/24    Expected End:  10/29/24            STG - Patient will complete toileting with cga. (Met)       Start:  10/19/24    Expected End:  10/26/24    Resolved:  10/23/24         STG - Patient will complete bathing with sba. (Met)       Start:  10/19/24    Expected End:  10/26/24    Resolved:  10/23/24         STG - Patient will complete LE dressing using adaptive equip as needed with cga. (Met)       Start:  10/19/24    Expected End:  10/26/24    Resolved:  10/23/24         STG - Patient will complete commode transfer via device as needed with cga. (Met)       Start:  10/19/24    Expected End:  10/26/24    Resolved:  10/23/24         STG - Patient will complete tub/shower transfer using DME as needed with min assist. (Met)       Start:  10/19/24    Expected End:  10/26/24    Resolved:  10/23/24         STG - Patient will complete functional mobility via device as needed with min  assist. (Met)       Start:  10/19/24    Expected End:  10/26/24    Resolved:  10/23/24

## 2024-10-30 ENCOUNTER — PHARMACY VISIT (OUTPATIENT)
Dept: PHARMACY | Facility: CLINIC | Age: 69
End: 2024-10-30
Payer: COMMERCIAL

## 2024-10-30 VITALS
HEIGHT: 63 IN | TEMPERATURE: 97.5 F | DIASTOLIC BLOOD PRESSURE: 83 MMHG | HEART RATE: 64 BPM | BODY MASS INDEX: 23.92 KG/M2 | RESPIRATION RATE: 18 BRPM | WEIGHT: 135 LBS | OXYGEN SATURATION: 96 % | SYSTOLIC BLOOD PRESSURE: 158 MMHG

## 2024-10-30 PROCEDURE — 2500000004 HC RX 250 GENERAL PHARMACY W/ HCPCS (ALT 636 FOR OP/ED)

## 2024-10-30 PROCEDURE — 97530 THERAPEUTIC ACTIVITIES: CPT | Mod: GP

## 2024-10-30 PROCEDURE — 2500000001 HC RX 250 WO HCPCS SELF ADMINISTERED DRUGS (ALT 637 FOR MEDICARE OP)

## 2024-10-30 PROCEDURE — RXMED WILLOW AMBULATORY MEDICATION CHARGE

## 2024-10-30 RX ORDER — DICLOFENAC SODIUM 10 MG/G
4 GEL TOPICAL 4 TIMES DAILY PRN
Qty: 4 G | Refills: 0 | Status: SHIPPED | OUTPATIENT
Start: 2024-10-30 | End: 2024-10-30

## 2024-10-30 RX ORDER — TIZANIDINE 4 MG/1
4 TABLET ORAL EVERY 6 HOURS PRN
Qty: 30 TABLET | Refills: 0 | Status: SHIPPED | OUTPATIENT
Start: 2024-10-30 | End: 2024-10-30

## 2024-10-30 RX ORDER — CLOPIDOGREL BISULFATE 75 MG/1
75 TABLET ORAL DAILY
Qty: 30 TABLET | Refills: 0 | Status: SHIPPED | OUTPATIENT
Start: 2024-10-30 | End: 2024-11-05 | Stop reason: SDUPTHER

## 2024-10-30 RX ORDER — DICLOFENAC SODIUM 10 MG/G
4 GEL TOPICAL 4 TIMES DAILY PRN
Qty: 100 G | Refills: 0 | Status: SHIPPED | OUTPATIENT
Start: 2024-10-30

## 2024-10-30 RX ORDER — ATORVASTATIN CALCIUM 40 MG/1
40 TABLET, FILM COATED ORAL NIGHTLY
Qty: 30 TABLET | Refills: 0 | Status: SHIPPED | OUTPATIENT
Start: 2024-10-30 | End: 2024-11-05 | Stop reason: SDUPTHER

## 2024-10-30 RX ORDER — TIZANIDINE 4 MG/1
4 TABLET ORAL EVERY 6 HOURS PRN
Qty: 30 TABLET | Refills: 0 | Status: SHIPPED | OUTPATIENT
Start: 2024-10-30

## 2024-10-30 RX ORDER — ACETAMINOPHEN 325 MG/1
650 TABLET ORAL EVERY 4 HOURS PRN
Qty: 30 TABLET | Refills: 0 | Status: SHIPPED | OUTPATIENT
Start: 2024-10-30 | End: 2024-11-29

## 2024-10-30 ASSESSMENT — PAIN DESCRIPTION - DESCRIPTORS: DESCRIPTORS: HEADACHE;SHARP;SHOOTING

## 2024-10-30 ASSESSMENT — BRIEF INTERVIEW FOR MENTAL STATUS (BIMS)
WHAT YEAR IS IT: CORRECT
ASKED TO RECALL BED: YES, NO CUE REQUIRED
ASKED TO RECALL SOCK: YES, NO CUE REQUIRED
INITIAL REPETITION OF BED BLUE SOCK - FIRST ATTEMPT: 3
WHAT DAY OF THE WEEK IS IT: CORRECT
WHAT MONTH IS IT: ACCURATE WITHIN 5 DAYS
BIMS SUMMARY SCORE: 15
ASKED TO RECALL BLUE: YES, NO CUE REQUIRED

## 2024-10-30 ASSESSMENT — PAIN - FUNCTIONAL ASSESSMENT
PAIN_FUNCTIONAL_ASSESSMENT: 0-10
PAIN_FUNCTIONAL_ASSESSMENT: 0-10

## 2024-10-30 ASSESSMENT — PAIN SCALES - GENERAL
PAINLEVEL_OUTOF10: 0 - NO PAIN
PAINLEVEL_OUTOF10: 8

## 2024-10-30 ASSESSMENT — PAIN SCALES - WONG BAKER
WONGBAKER_NUMERICALRESPONSE: NO HURT
WONGBAKER_NUMERICALRESPONSE: HURTS WHOLE LOT

## 2024-10-30 ASSESSMENT — PAIN DESCRIPTION - LOCATION: LOCATION: HEAD

## 2024-10-30 NOTE — DISCHARGE INSTRUCTIONS
1) Following recommendations were made on your discharge day:    -Follow up with primary care physician regarding elevated morning blood pressure  -Follow up with primary care physician regarding incidental finding of compression fractures  -Follow up with neurology 3 months from stroke, continue Asprin and Plavix until that time  -Follow up with vascular physician 3 months from stroke.  -Follow up with Rheumatologist to discuss recent stroke.      2) Please, follow-up with your primary care provider within 7 to 14 days after leaving the hospital. /appointment services has been requested to make an appointment for you, however if you do not hear back from them within 1 to 2 days, please call your primary physician's office to schedule an appointment. Bring your photo ID and insurance card to your appointment.   [facility]  services can be reached at [phone] and appointment services can be reached at [phone].    - Please, call   or appointment services and schedule a follow-up with your PCP within 1-2 weeks after you leave the hospital.    3) If you experience any worsening symptoms or have any concerns, please contact your primary care provider to schedule an appointment. If you cannot get in touch with your primary care physician, please return to the nearest emergency room or urgent care clinic for an evaluation and treatment.    Thank you for choosing [facility] and allowing us to partake in your medical care!    - Your primary care inpatient team.

## 2024-10-30 NOTE — DISCHARGE SUMMARY
Discharge Diagnosis  Cerebrovascular accident (CVA) due to occlusion of right vertebral artery (Multi)    Issues Requiring Follow-Up  CVA 2/2 occlusion of right vertebral artery  HTN  Compression fractures (incidental)      Test Results Pending At Discharge  Pending Labs       No current pending labs.            Hospital Course  Jahaira Moore is a 69 y.o. female presenting with a right hemiparesis.  She presented to [facility] on 10/16/2024 with right facial paralysis, gait instability, inability to walk.  Nausea and vomiting.  An MRI of her brain on 10/15/2024 showed scattered areas of hyperintense signal in the periventricular and subcortical right white matter of the bilateral cerebral hemispheres consistent with chronic microvascular disease.     She participated in 3 hours of therapy daily.  Was admitted at Level min assist to mod assist for transfers and functional mobility but improved to stand by assist to min assist overall.    Patient had intermittent dizziness which resolved with decreasing frequency of tizanidine.  Voltaren gel was given for wrist pain after the fall which improved her pain.       Pertinent Physical Exam At Time of Discharge  Physical Exam  Constitutional: Appears stated age. In NAD.   HEENT: NC/AT, EOMI, clear sclera, moist mucous membranes  CV: RRR, No M/R/G  PULM: CTAB, no coughing or wheezing  ABDOMEN: Soft, NT/ND. No TTP.  SKIN: Normal Color, Warm, Dry, No Rashes   EXTREMITIES: Non-Tender, Full ROM, No Pedal Edema  NEURO: A&O x 4, No gross motor deficits,RLE ataxia more apparent in gait than physical exam  PSYCH: Normal Mood & Behavior     Function: SBA to Min A    Home Medications     Medication List      START taking these medications     acetaminophen 325 mg tablet; Commonly known as: Tylenol; Take 2 tablets   (650 mg) by mouth every 4 hours if needed for mild pain (1 - 3).   diclofenac sodium 1 % gel; Commonly known as: Voltaren; Apply 4.5 inches   (4 g) topically 4 times a day as  needed (for wrist pain).   tiZANidine 4 mg tablet; Commonly known as: Zanaflex; Take 1 tablet (4   mg) by mouth every 6 hours if needed for muscle spasms.     CONTINUE taking these medications     ARIPiprazole 2 mg tablet; Commonly known as: Abilify   aspirin 81 mg EC tablet; Take 1 tablet (81 mg) by mouth once daily.   atorvastatin 40 mg tablet; Commonly known as: Lipitor; Take 1 tablet (40   mg) by mouth once daily at bedtime.   buPROPion  mg 24 hr tablet; Commonly known as: Wellbutrin XL   cholecalciferol 25 MCG (1000 UT) capsule; Commonly known as: Vitamin D-3   clopidogrel 75 mg tablet; Commonly known as: Plavix; Take 1 tablet (75   mg) by mouth once daily.   * DULoxetine 60 mg DR capsule; Commonly known as: Cymbalta   * DULoxetine 30 mg DR capsule; Commonly known as: Cymbalta   gabapentin 300 mg capsule; Commonly known as: Neurontin   Kevzara 200 mg/1.14 mL injection; Generic drug: sarilumab   magnesium oxide 500 mg magnesium tablet   pantoprazole 40 mg EC tablet; Commonly known as: ProtoNix; TAKE 1 TABLET   BY MOUTH DAILY   * predniSONE 5 mg tablet; Commonly known as: Deltasone   * predniSONE 1 mg tablet; Commonly known as: Deltasone   traZODone 50 mg tablet; Commonly known as: Desyrel  * This list has 4 medication(s) that are the same as other medications   prescribed for you. Read the directions carefully, and ask your doctor or   other care provider to review them with you.       Outpatient Follow-Up  Future Appointments   Date Time Provider Department Center   11/5/2024  4:00 PM [facility] Michael Greene DO  PM&R  PGY-2

## 2024-10-30 NOTE — CARE PLAN
The patient's goals for the shift include      The clinical goals for the shift include discharge home      Problem: Pain - Adult  Goal: Verbalizes/displays adequate comfort level or baseline comfort level  Outcome: Progressing     Problem: Pain - Adult  Goal: Verbalizes/displays adequate comfort level or baseline comfort level  Outcome: Progressing     Problem: Discharge Planning  Goal: Discharge to home or other facility with appropriate resources  Outcome: Progressing     Problem: Chronic Conditions and Co-morbidities  Goal: Patient's chronic conditions and co-morbidity symptoms are monitored and maintained or improved  Outcome: Progressing     Problem: Pain  Goal: Takes deep breaths with improved pain control throughout the shift  Outcome: Progressing  Goal: Turns in bed with improved pain control throughout the shift  Outcome: Progressing  Goal: Walks with improved pain control throughout the shift  Outcome: Progressing  Goal: Performs ADL's with improved pain control throughout shift  Outcome: Progressing  Goal: Participates in PT with improved pain control throughout the shift  Outcome: Progressing     Problem: Skin  Goal: Decreased wound size/increased tissue granulation at next dressing change  Outcome: Progressing  Goal: Participates in plan/prevention/treatment measures  Outcome: Progressing  Goal: Promote/optimize nutrition  Outcome: Progressing  Goal: Promote skin healing  Outcome: Progressing     Problem: Nutrition  Goal: Oral intake greater 75%  Outcome: Progressing  Goal: Consume prescribed supplement  Outcome: Progressing  Goal: Adequate PO fluid intake  Outcome: Progressing  Goal: BG  mg/dL  Outcome: Progressing  Goal: Lab values WNL  Outcome: Progressing  Goal: Maintain stable weight  Outcome: Progressing

## 2024-10-30 NOTE — CARE PLAN
OT discharge summary  Patient independent in UB ADLs. Patient requires supervision for LB ADLs and commode/tub showers. Family training completed with sister on two occasions. Recommended patient to go home with supervision and at wheelchair level for mobility.     Problem: OT Problem  Goal: LTG - Patient will complete toileting with supervision.  Outcome: Met  Goal: LTG - Patient will complete LE dressing using adaptive equip as needed with supervision.  Outcome: Met  Goal: LTG - Patient will complete commode transfer via device as needed with supervision.  Outcome: Met  Goal: LTG - Patient will complete tub/shower transfer using DME as needed with sba.  Outcome: Met  Goal: LTG - Patient will complete functional mobility via device as needed with s/sba.  Outcome: Met  Goal: LTG - Patient will complete light meal prep or kitchen access with sba.  Outcome: Met     Lamar Cornell OTR/L supervised and approves Marua Osorio S/OT documentation and treatment of this patient.

## 2024-10-30 NOTE — PROGRESS NOTES
Physical Therapy    Car transfer training with pt and son.  After training, pt performed car transfer with SBA.

## 2024-10-31 ENCOUNTER — DOCUMENTATION (OUTPATIENT)
Dept: HOME HEALTH SERVICES | Facility: HOME HEALTH | Age: 69
End: 2024-10-31
Payer: MEDICARE

## 2024-10-31 ENCOUNTER — HOME HEALTH ADMISSION (OUTPATIENT)
Dept: HOME HEALTH SERVICES | Facility: HOME HEALTH | Age: 69
End: 2024-10-31
Payer: MEDICARE

## 2024-10-31 NOTE — CARE PLAN
Pt has achieved 2/4 LTGs from initial eval.  Pt discharged home with assist from family.  Pt owns wheeled walker and was issued a wheelchair to improve safe independence.  Family training was completed with pt's sisters.  Please see daily note for details.    Pt to receive [facility] PT services.    Problem: PT Problem  Goal: STG - Bed mobility with supervision  Outcome: Met  Goal: STG - Transfers with CGA  Outcome: Met  Goal: STG - Pt will amb 100 ft with wheeled walker and min/mod assist x1.  Outcome: Met  Goal: STG - Pt will negotiate 2 stairs with rails and min assist x2.  Outcome: Met  Goal: LTG - Mod independent bed mobility.  Outcome: Met  Goal: LTG - Pt will negotiate 5 stairs with rails and min assist x1.  Outcome: Met     Problem: PT Problem  Goal: LTG - Mod independent transfers  Outcome: Not met  Goal: LTG - Pt will amb 150 ft with wheeled walker and SBA  Outcome: Not met

## 2024-11-01 ENCOUNTER — PATIENT OUTREACH (OUTPATIENT)
Dept: PRIMARY CARE | Facility: CLINIC | Age: 69
End: 2024-11-01
Payer: MEDICARE

## 2024-11-02 ENCOUNTER — HOME CARE VISIT (OUTPATIENT)
Dept: HOME HEALTH SERVICES | Facility: HOME HEALTH | Age: 69
End: 2024-11-02
Payer: MEDICARE

## 2024-11-02 VITALS
HEART RATE: 66 BPM | SYSTOLIC BLOOD PRESSURE: 120 MMHG | TEMPERATURE: 98.6 F | DIASTOLIC BLOOD PRESSURE: 80 MMHG | OXYGEN SATURATION: 95 %

## 2024-11-02 PROCEDURE — G0151 HHCP-SERV OF PT,EA 15 MIN: HCPCS

## 2024-11-02 ASSESSMENT — ENCOUNTER SYMPTOMS
PAIN LOCATION - PAIN SEVERITY: 8/10
PAIN LOCATION: BACK
PERSON REPORTING PAIN: PATIENT
PAIN: 1

## 2024-11-02 ASSESSMENT — ACTIVITIES OF DAILY LIVING (ADL)
ENTERING_EXITING_HOME: CONTACT GUARD ASSIST
OASIS_M1830: 03
AMBULATION_DISTANCE/DURATION_TOLERATED: 100 FT
AMBULATION ASSISTANCE ON FLAT SURFACES: 1

## 2024-11-04 ENCOUNTER — HOME CARE VISIT (OUTPATIENT)
Dept: HOME HEALTH SERVICES | Facility: HOME HEALTH | Age: 69
End: 2024-11-04
Payer: MEDICARE

## 2024-11-04 VITALS — SYSTOLIC BLOOD PRESSURE: 138 MMHG | DIASTOLIC BLOOD PRESSURE: 60 MMHG | HEART RATE: 66 BPM | OXYGEN SATURATION: 96 %

## 2024-11-04 PROCEDURE — G0152 HHCP-SERV OF OT,EA 15 MIN: HCPCS

## 2024-11-04 ASSESSMENT — ACTIVITIES OF DAILY LIVING (ADL)
BATHING_CURRENT_FUNCTION: SUPERVISION
TOILETING: SUPERVISION
PREPARING MEALS: NEEDS ASSISTANCE
TOILETING: 1
DRESSING_LB_CURRENT_FUNCTION: SUPERVISION
BATHING ASSESSED: 1
DRESSING_UB_CURRENT_FUNCTION: SUPERVISION

## 2024-11-04 ASSESSMENT — ENCOUNTER SYMPTOMS
PAIN LOCATION - EXACERBATING FACTORS: TIME OF DAY
PAIN LOCATION - PAIN SEVERITY: 8/10
HIGHEST PAIN SEVERITY IN PAST 24 HOURS: 9/10
LOWEST PAIN SEVERITY IN PAST 24 HOURS: 4/10
PAIN LOCATION: BACK
PAIN: 1
PAIN LOCATION - PAIN FREQUENCY: FREQUENT
PAIN LOCATION - RELIEVING FACTORS: REST/MEDS
PAIN LOCATION - EXACERBATING FACTORS: MOVEMENT
PERSON REPORTING PAIN: PATIENT
PAIN LOCATION: FACE
PAIN LOCATION - PAIN SEVERITY: 7/10
PAIN LOCATION - PAIN FREQUENCY: FREQUENT

## 2024-11-05 ENCOUNTER — APPOINTMENT (OUTPATIENT)
Dept: PRIMARY CARE | Facility: CLINIC | Age: 69
End: 2024-11-05
Payer: MEDICARE

## 2024-11-05 VITALS
TEMPERATURE: 97.3 F | HEIGHT: 63 IN | OXYGEN SATURATION: 94 % | SYSTOLIC BLOOD PRESSURE: 130 MMHG | HEART RATE: 92 BPM | BODY MASS INDEX: 25.69 KG/M2 | WEIGHT: 145 LBS | DIASTOLIC BLOOD PRESSURE: 72 MMHG

## 2024-11-05 DIAGNOSIS — F32.2 AGITATED DEPRESSION (MULTI): ICD-10-CM

## 2024-11-05 DIAGNOSIS — N18.32 CHRONIC RENAL IMPAIRMENT, STAGE 3B (MULTI): ICD-10-CM

## 2024-11-05 DIAGNOSIS — I63.211 CEREBRAL INFARCTION DUE TO OCCLUSION OF RIGHT VERTEBRAL ARTERY (MULTI): ICD-10-CM

## 2024-11-05 DIAGNOSIS — I63.211 CEREBROVASCULAR ACCIDENT (CVA) DUE TO OCCLUSION OF RIGHT VERTEBRAL ARTERY (MULTI): ICD-10-CM

## 2024-11-05 DIAGNOSIS — R31.1 BENIGN ESSENTIAL MICROSCOPIC HEMATURIA: ICD-10-CM

## 2024-11-05 DIAGNOSIS — Z09 HOSPITAL DISCHARGE FOLLOW-UP: Primary | ICD-10-CM

## 2024-11-05 DIAGNOSIS — I63.9 CEREBRAL INFARCTION, UNSPECIFIED: ICD-10-CM

## 2024-11-05 DIAGNOSIS — E27.8 OTHER SPECIFIED DISORDERS OF ADRENAL GLAND: ICD-10-CM

## 2024-11-05 PROCEDURE — 1126F AMNT PAIN NOTED NONE PRSNT: CPT | Performed by: FAMILY MEDICINE

## 2024-11-05 PROCEDURE — 1111F DSCHRG MED/CURRENT MED MERGE: CPT | Performed by: FAMILY MEDICINE

## 2024-11-05 PROCEDURE — 4004F PT TOBACCO SCREEN RCVD TLK: CPT | Performed by: FAMILY MEDICINE

## 2024-11-05 PROCEDURE — 99496 TRANSJ CARE MGMT HIGH F2F 7D: CPT | Performed by: FAMILY MEDICINE

## 2024-11-05 PROCEDURE — 3008F BODY MASS INDEX DOCD: CPT | Performed by: FAMILY MEDICINE

## 2024-11-05 PROCEDURE — 1159F MED LIST DOCD IN RCRD: CPT | Performed by: FAMILY MEDICINE

## 2024-11-05 PROCEDURE — 1160F RVW MEDS BY RX/DR IN RCRD: CPT | Performed by: FAMILY MEDICINE

## 2024-11-05 RX ORDER — ATORVASTATIN CALCIUM 40 MG/1
40 TABLET, FILM COATED ORAL NIGHTLY
Qty: 90 TABLET | Refills: 3 | Status: SHIPPED | OUTPATIENT
Start: 2024-11-05 | End: 2025-11-05

## 2024-11-05 RX ORDER — CLOPIDOGREL BISULFATE 75 MG/1
75 TABLET ORAL DAILY
Qty: 90 TABLET | Refills: 3 | Status: SHIPPED | OUTPATIENT
Start: 2024-11-05 | End: 2025-11-05

## 2024-11-05 ASSESSMENT — ENCOUNTER SYMPTOMS
PSYCHIATRIC NEGATIVE: 1
TREMORS: 0
LIGHT-HEADEDNESS: 0
WEAKNESS: 1
HEADACHES: 0
FEVER: 0
DIZZINESS: 0
FACIAL ASYMMETRY: 0
SPEECH DIFFICULTY: 0
CHEST TIGHTNESS: 0
NUMBNESS: 1
CHILLS: 0
SHORTNESS OF BREATH: 0

## 2024-11-05 ASSESSMENT — PAIN SCALES - GENERAL: PAINLEVEL_OUTOF10: 0-NO PAIN

## 2024-11-05 NOTE — PROGRESS NOTES
"Patient: Jahaira Moore  : 1955  PCP: Rupesh Campbell DO  MRN: [id number]  Program: Transitional Care Management  Status: Enrolled  Effective Dates: 2024 - present  Responsible Staff: Hortencia Salinas MA  Social Drivers to be Addressed: Physical Activity, Social Connections, Stress, Tobacco Use         Jahaira Moore is a 69 y.o. female presenting today for follow-up after being discharged from the hospital 6 days ago. The main problem requiring admission was stroke. The discharge summary and/or Transitional Care Management documentation was reviewed. Medication reconciliation was performed as indicated via the \"Tone as Reviewed\" timestamp.     Jahaira Moore was contacted by Transitional Care Management services two days after her discharge. This encounter and supporting documentation was reviewed.    Review of Systems   Constitutional:  Negative for chills and fever.   Respiratory:  Negative for chest tightness and shortness of breath.    Cardiovascular:  Negative for leg swelling.   Neurological:  Positive for weakness and numbness. Negative for dizziness, tremors, facial asymmetry, speech difficulty, light-headedness and headaches.   Psychiatric/Behavioral: Negative.     All other systems reviewed and are negative.      /72 (BP Location: Left arm, Patient Position: Sitting, BP Cuff Size: Adult)   Pulse 92   Temp 36.3 °C (97.3 °F) (Temporal)   Ht 1.6 m (5' 3\")   Wt 65.8 kg (145 lb)   SpO2 94%   BMI 25.69 kg/m²     Physical Exam  Vitals reviewed.   Constitutional:       Appearance: Normal appearance.   HENT:      Head: Normocephalic and atraumatic.      Right Ear: Tympanic membrane normal.      Left Ear: Tympanic membrane normal.   Eyes:      Extraocular Movements: Extraocular movements intact.      Pupils: Pupils are equal, round, and reactive to light.   Neck:      Vascular: No carotid bruit.   Cardiovascular:      Rate and Rhythm: Normal rate and regular rhythm.      Heart sounds: Normal heart sounds. "   Pulmonary:      Breath sounds: Normal breath sounds.   Abdominal:      Palpations: Abdomen is soft.      Tenderness: There is no abdominal tenderness.   Musculoskeletal:      Cervical back: No rigidity.   Lymphadenopathy:      Cervical: No cervical adenopathy.   Skin:     Findings: No rash.   Neurological:      Mental Status: She is alert and oriented to person, place, and time.      Sensory: No sensory deficit.      Motor: Weakness present.      Coordination: Coordination abnormal.      Gait: Gait abnormal.      Deep Tendon Reflexes: Reflexes normal.   Psychiatric:         Mood and Affect: Mood normal.         Behavior: Behavior normal.         The complexity of medical decision making for this patient's transitional care is high.    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

## 2024-11-06 ENCOUNTER — HOME CARE VISIT (OUTPATIENT)
Dept: HOME HEALTH SERVICES | Facility: HOME HEALTH | Age: 69
End: 2024-11-06
Payer: MEDICARE

## 2024-11-06 PROCEDURE — G0151 HHCP-SERV OF PT,EA 15 MIN: HCPCS

## 2024-11-06 ASSESSMENT — ENCOUNTER SYMPTOMS
PERSON REPORTING PAIN: PATIENT
DENIES PAIN: 1

## 2024-11-07 ENCOUNTER — PATIENT OUTREACH (OUTPATIENT)
Dept: PRIMARY CARE | Facility: CLINIC | Age: 69
End: 2024-11-07
Payer: MEDICARE

## 2024-11-08 ENCOUNTER — HOME CARE VISIT (OUTPATIENT)
Dept: HOME HEALTH SERVICES | Facility: HOME HEALTH | Age: 69
End: 2024-11-08
Payer: MEDICARE

## 2024-11-08 PROCEDURE — G0157 HHC PT ASSISTANT EA 15: HCPCS | Mod: CQ

## 2024-11-08 ASSESSMENT — ENCOUNTER SYMPTOMS
PAIN LOCATION: BACK
PAIN: 1
HIGHEST PAIN SEVERITY IN PAST 24 HOURS: 5/10
PAIN SEVERITY GOAL: 0/10
LOWEST PAIN SEVERITY IN PAST 24 HOURS: 2/10
PERSON REPORTING PAIN: PATIENT
SUBJECTIVE PAIN PROGRESSION: WAXING AND WANING

## 2024-11-11 ENCOUNTER — DOCUMENTATION (OUTPATIENT)
Dept: PRIMARY CARE | Facility: CLINIC | Age: 69
End: 2024-11-11

## 2024-11-11 ENCOUNTER — TELEPHONE (OUTPATIENT)
Dept: PRIMARY CARE | Facility: CLINIC | Age: 69
End: 2024-11-11

## 2024-11-11 NOTE — PROGRESS NOTES
Subjective   Patient ID: Jahaira Moore is a 69 y.o. female who presents for No chief complaint on file..    HPI     Review of Systems    Objective   There were no vitals taken for this visit.    Physical Exam    Assessment/Plan

## 2024-11-12 ENCOUNTER — HOME CARE VISIT (OUTPATIENT)
Dept: HOME HEALTH SERVICES | Facility: HOME HEALTH | Age: 69
End: 2024-11-12
Payer: MEDICARE

## 2024-11-12 VITALS — OXYGEN SATURATION: 98 % | HEART RATE: 73 BPM | DIASTOLIC BLOOD PRESSURE: 60 MMHG | SYSTOLIC BLOOD PRESSURE: 110 MMHG

## 2024-11-12 PROCEDURE — G0157 HHC PT ASSISTANT EA 15: HCPCS | Mod: CQ

## 2024-11-12 PROCEDURE — G0152 HHCP-SERV OF OT,EA 15 MIN: HCPCS

## 2024-11-12 ASSESSMENT — ENCOUNTER SYMPTOMS
LOWEST PAIN SEVERITY IN PAST 24 HOURS: 0/10
PAIN LOCATION - RELIEVING FACTORS: MEDS
PAIN LOCATION: BACK
SUBJECTIVE PAIN PROGRESSION: UNCHANGED
PAIN LOCATION: BACK
PAIN: 1
HIGHEST PAIN SEVERITY IN PAST 24 HOURS: 6/10
PAIN LOCATION - PAIN FREQUENCY: FREQUENT
HIGHEST PAIN SEVERITY IN PAST 24 HOURS: 10/10
SUBJECTIVE PAIN PROGRESSION: WAXING AND WANING
PERSON REPORTING PAIN: PATIENT
PAIN SEVERITY GOAL: 0/10
PAIN: 1
PAIN LOCATION - PAIN SEVERITY: 6/10
PERSON REPORTING PAIN: PATIENT
LOWEST PAIN SEVERITY IN PAST 24 HOURS: 6/10

## 2024-11-12 ASSESSMENT — ACTIVITIES OF DAILY LIVING (ADL)
LAUNDRY ASSESSED: 1
LAUNDRY: INDEPENDENT
PREPARING MEALS: INDEPENDENT

## 2024-11-14 ENCOUNTER — HOME CARE VISIT (OUTPATIENT)
Dept: HOME HEALTH SERVICES | Facility: HOME HEALTH | Age: 69
End: 2024-11-14
Payer: MEDICARE

## 2024-11-14 PROCEDURE — G0157 HHC PT ASSISTANT EA 15: HCPCS | Mod: CQ

## 2024-11-14 ASSESSMENT — ENCOUNTER SYMPTOMS
PERSON REPORTING PAIN: PATIENT
PAIN SEVERITY GOAL: 0/10
SUBJECTIVE PAIN PROGRESSION: WAXING AND WANING
PAIN LOCATION: BACK
HIGHEST PAIN SEVERITY IN PAST 24 HOURS: 6/10
PAIN: 1
LOWEST PAIN SEVERITY IN PAST 24 HOURS: 0/10

## 2024-11-19 ENCOUNTER — HOME CARE VISIT (OUTPATIENT)
Dept: HOME HEALTH SERVICES | Facility: HOME HEALTH | Age: 69
End: 2024-11-19
Payer: MEDICARE

## 2024-11-19 PROCEDURE — G0157 HHC PT ASSISTANT EA 15: HCPCS | Mod: CQ

## 2024-11-20 ENCOUNTER — LAB (OUTPATIENT)
Dept: LAB | Facility: LAB | Age: 69
End: 2024-11-20
Payer: MEDICARE

## 2024-11-20 ENCOUNTER — OFFICE VISIT (OUTPATIENT)
Dept: PAIN MEDICINE | Facility: CLINIC | Age: 69
End: 2024-11-20
Payer: MEDICARE

## 2024-11-20 VITALS
HEIGHT: 61 IN | DIASTOLIC BLOOD PRESSURE: 81 MMHG | BODY MASS INDEX: 27.38 KG/M2 | WEIGHT: 145 LBS | RESPIRATION RATE: 15 BRPM | TEMPERATURE: 97.5 F | HEART RATE: 72 BPM | OXYGEN SATURATION: 95 % | SYSTOLIC BLOOD PRESSURE: 164 MMHG

## 2024-11-20 DIAGNOSIS — M51.26 LUMBAR DISC HERNIATION: Primary | ICD-10-CM

## 2024-11-20 DIAGNOSIS — Z71.6 TOBACCO ABUSE COUNSELING: ICD-10-CM

## 2024-11-20 DIAGNOSIS — G89.29 CHRONIC BILATERAL LOW BACK PAIN WITH BILATERAL SCIATICA: ICD-10-CM

## 2024-11-20 DIAGNOSIS — R31.1 BENIGN ESSENTIAL MICROSCOPIC HEMATURIA: ICD-10-CM

## 2024-11-20 DIAGNOSIS — M54.16 LUMBAR NEURITIS: ICD-10-CM

## 2024-11-20 DIAGNOSIS — M54.42 CHRONIC BILATERAL LOW BACK PAIN WITH BILATERAL SCIATICA: ICD-10-CM

## 2024-11-20 DIAGNOSIS — N18.2 CHRONIC RENAL IMPAIRMENT, STAGE 2 (MILD): ICD-10-CM

## 2024-11-20 DIAGNOSIS — M54.41 CHRONIC BILATERAL LOW BACK PAIN WITH BILATERAL SCIATICA: ICD-10-CM

## 2024-11-20 LAB
ANION GAP SERPL CALC-SCNC: 14 MMOL/L (ref 10–20)
APPEARANCE UR: CLEAR
BILIRUB UR STRIP.AUTO-MCNC: NEGATIVE MG/DL
BUN SERPL-MCNC: 35 MG/DL (ref 6–23)
CALCIUM SERPL-MCNC: 9.3 MG/DL (ref 8.6–10.3)
CHLORIDE SERPL-SCNC: 100 MMOL/L (ref 98–107)
CO2 SERPL-SCNC: 29 MMOL/L (ref 21–32)
COLOR UR: NORMAL
CREAT SERPL-MCNC: 2.07 MG/DL (ref 0.5–1.05)
EGFRCR SERPLBLD CKD-EPI 2021: 25 ML/MIN/1.73M*2
GLUCOSE SERPL-MCNC: 101 MG/DL (ref 74–99)
GLUCOSE UR STRIP.AUTO-MCNC: NORMAL MG/DL
KETONES UR STRIP.AUTO-MCNC: NEGATIVE MG/DL
LEUKOCYTE ESTERASE UR QL STRIP.AUTO: NEGATIVE
NITRITE UR QL STRIP.AUTO: NEGATIVE
PH UR STRIP.AUTO: 6.5 [PH]
POTASSIUM SERPL-SCNC: 5 MMOL/L (ref 3.5–5.3)
PROT UR STRIP.AUTO-MCNC: NEGATIVE MG/DL
RBC # UR STRIP.AUTO: NEGATIVE /UL
SODIUM SERPL-SCNC: 138 MMOL/L (ref 136–145)
SP GR UR STRIP.AUTO: 1.01
UROBILINOGEN UR STRIP.AUTO-MCNC: NORMAL MG/DL

## 2024-11-20 PROCEDURE — 99214 OFFICE O/P EST MOD 30 MIN: CPT | Performed by: ANESTHESIOLOGY

## 2024-11-20 PROCEDURE — 81003 URINALYSIS AUTO W/O SCOPE: CPT

## 2024-11-20 PROCEDURE — 80048 BASIC METABOLIC PNL TOTAL CA: CPT

## 2024-11-20 PROCEDURE — 36415 COLL VENOUS BLD VENIPUNCTURE: CPT

## 2024-11-20 RX ORDER — HYDROCHLOROTHIAZIDE 25 MG/1
25 TABLET ORAL DAILY
COMMUNITY

## 2024-11-20 ASSESSMENT — PAIN DESCRIPTION - DESCRIPTORS: DESCRIPTORS: ACHING;DULL

## 2024-11-20 ASSESSMENT — ENCOUNTER SYMPTOMS
PERSON REPORTING PAIN: PATIENT
PAIN SEVERITY GOAL: 0/10
LOSS OF SENSATION IN FEET: 0
HIGHEST PAIN SEVERITY IN PAST 24 HOURS: 6/10
OCCASIONAL FEELINGS OF UNSTEADINESS: 1
PAIN: 1
LOWEST PAIN SEVERITY IN PAST 24 HOURS: 3/10
SUBJECTIVE PAIN PROGRESSION: WAXING AND WANING
PAIN LOCATION: BACK

## 2024-11-20 ASSESSMENT — PAIN SCALES - GENERAL
PAINLEVEL_OUTOF10: 7
PAINLEVEL_OUTOF10: 7

## 2024-11-20 ASSESSMENT — PAIN - FUNCTIONAL ASSESSMENT: PAIN_FUNCTIONAL_ASSESSMENT: 0-10

## 2024-11-20 NOTE — PROGRESS NOTES
History Of Present Illness  Jahaira Moore is a 69 y.o. female presenting with   Chief Complaint   Patient presents with    Follow-up       Patient who returns for chronic low back pain to the LEFT GROIN and LEFT HIP AREA. She continues to report improved chronic neck pain.    Pt reports she suffered a TIA on 10- and it affected her strength in both legs. She felt weakness and fell. The pt admitted to [facility] via the ED. Pt reports she was admitted for over 2 weeks and is attending PT and has made a good recover. She is now on daily ASA and PLAVIX.      She also has low back pain that does RADIATE INTO HER LEFT buttock and GROIN AREA. The pain WAS in her leg and has since resolved with her injections. She reports her back pain got worse in December of 2020 after a busy holiday work schedule at [employer].      The pain is constant, worse standing or with head tilting activity and better with rest. The pain is sharp, nagging and shooting to the LUE. Denies LE paresthesias, weakness, saddle anesthesia, bowel or bladder incontinence. To manage this pain the patient has attempted over 6 weeks of physical therapy at [facility] in November/December of 2020.      The patients chronic depression on Cymbalta and Wellbutrin and Trazodone. She is on oral daily prednisone from her Rheumatologist.      PAIN SCORE: 3-8 /10.     PSHx  -Hysterectomy  -Right tympanic membrane repair  -Bilateral TKA     SocHx  -Retired occupation/ massotherapy  -Positive HX OF Tob 1 PPW QUIT AS OF DEC 2020.   -Occ EtoH 4-5 glasses of wine a week.      PCP: Dr. Rupesh Campbell   Ref: Neuro  Rheum: Dr. Edgard Patino / Dr. 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    Surgical History  She has a past surgical history that includes Hysterectomy (11/28/2015); Other surgical history (11/28/2015); and Knee surgery (05/30/2018).     Social History  She reports that she has been smoking cigarettes. She has never used smokeless tobacco. She reports that she does not drink alcohol and does not use drugs.    Family History  No family history on file.     Allergies  Latex    Review of Systems    All other systems reviewed and negative for any deficits. Pertinent positives and negatives were considered in the medical decision making process.        Physical Exam  /81   Pulse 72   Temp 36.4 °C (97.5 °F)   Resp 15   Wt 65.8 kg (145 lb)   SpO2 95%   General: Pt appears stated age     Eyes: Conjunctiva non-icteric and lids without obvious rash or drooping. Pupils are symmetric     ENT: External ears are without deformity or rash. Hearing is grossly intact     Neck: No JVD noted, tracheal position midline.      ---pain on tilting her head      Musculoskeletal: Gait is grossly normal     Digits/nails show no clubbing or cyanosis     Exam of muscles/joints/bones shows no gross atrophy and no abnormal/involuntary movements in the head/neckNo asymmetry or masses noted in the head/neck     Stability: no subluxation noted on movement of bilateral upper extremities or head/neck     Strength: 4+/5 in B/L upper extremities , 4/5 in bilateral LE      Range of Motion: WNL      Sensation: In tact      Cranial nerves 2-12 are grossly intact     Psychiatric: Pt is alert and oriented to time, place and person.       Assessment/Plan   1. Lumbar disc herniation        2. Lumbar neuritis        3. Chronic bilateral low  back pain with bilateral sciatica        4. Tobacco abuse counseling               Diagnoses/Problems   · Lumbosacral neuritis (724.4) (M54.17)   · Chronic low back pain (724.2,338.29) (M54.50,G89.29)   · Lumbar disc herniation (722.10) (M51.26)   · Lumbar neuritis (724.4) (M54.16)   · Lumbar spondylosis (721.3) (M47.816)   · Lumbar stenosis (724.02) (M48.061)     Provider Impressions     1. I have previously provided the patient with a list of physical therapy exercises to learn and perform.     She does her stretches twice every day.      We also discussed leg lefts to maintain her muscle mass.      She has continued on over 6 weeks of home therapy exercises with no improvement in her low back pain. She has also failed over 6 weeks of Cymbalta and OTC NSAIDs including Voltaren and Tizanidine.      2. I again reviewed the patients MRI (2-4-2021) findings in detail, including review of the actual images and provided a detailed explanation of the findings using a spine model.      There are disc herniations at L3/4 and L4/5 with foraminal stenosis and a grade I anterolisthesis at both of these levels.      3. I previously reviewed the patients X-ray (2021) findings in detail, including review of the actual images and provided a detailed explanation of the findings using a spine model.      There is a grade I spondylolisthesis of L4 on L5, There is extensive degenerative changes at the L1/2 and L2/3 level. There is mild scoliosis centered at L3.      There was also incidental notation of a thoracic disc herniation at T11/12      4. I would recommend the pt CONTINUE on CYMBALTA to help with nerve related pain. We discussed the risks, benefits, and side effects to this medication including the mechanism of action and the pt understands and agrees.     5. I reviewed the patients cervical x-ray findings in detail, including review of the actual images and provided a detailed explanation of the findings using a spine  model. There is grade I spondylolisthesis of the cervical spine at C3/4 and C5/6, multilevel cervical spondylosis and cervical foraminal stenosis.      Given the foraminal stenosis on x-ray I would recommend a Cervical MRI. There is spondylolisthesis and multilevel degenerative changes. She has also failed PT and oral Cymbalta for over 6 weeks.      6. The patient is a candidate for an DIXON at C7/T1 versus Cervical facet to treat back and radicular pain. I spent time with the patient discussing all of the risks, benefits, and alternatives to this measure. Including but not limited to spinal infection, epidural hematoma/abscess, paralysis, nerve injury, steroid effects, and spinal headache. The patient understands and agrees to proceed as needed.      7. She quit tobacco as of December 2020 based on our advice.      8. The patient is a candidate for an LESI at L3/4 vs L5/S1 vs T11/12 TESI to treat back and radicular pain. I spent time with the patient discussing all of the risks, benefits, and alternatives to this measure. Including but not limited to spinal infection, epidural hematoma/abscess, paralysis, nerve injury, steroid effects, and spinal headache. The patient understands and agrees to proceed.     Her last lumbar injection was done on 2-, 9- (L5/S1 LESI BEST ONE YET)  5- and she reported 50-80% relief lasting for several weeks, however, her pain has slowly started returned but is more towards her groin area. She reports she was able to stand and walk with less pain, however, since it has returned being active is very painful for her.      9. Recall: The pt did report left hip pain I did previously order bilateral hip joint x-rays done on 4-2-2022 and it showed mild bilateral OA.     FUV IN APRIL 2025 as she recently suffered a stroke on 10-.      I spent time with the patient reviewing their imaging and discussing the risks benefits and alternatives to the above plan. A total of  30 minutes was spent reviewing the data and greater than 50% of that time was with the patient during the face to face encounter discussing treatment options both surgical, non-surgical, and minimally invasive techniques.        Mario Odonnell MD

## 2024-11-21 ENCOUNTER — HOME CARE VISIT (OUTPATIENT)
Dept: HOME HEALTH SERVICES | Facility: HOME HEALTH | Age: 69
End: 2024-11-21
Payer: MEDICARE

## 2024-11-21 PROCEDURE — G0157 HHC PT ASSISTANT EA 15: HCPCS | Mod: CQ

## 2024-11-21 ASSESSMENT — ENCOUNTER SYMPTOMS
LOWEST PAIN SEVERITY IN PAST 24 HOURS: 0/10
PERSON REPORTING PAIN: PATIENT
SUBJECTIVE PAIN PROGRESSION: WAXING AND WANING
PAIN SEVERITY GOAL: 0/10
PAIN LOCATION: BACK
PAIN: 1
HIGHEST PAIN SEVERITY IN PAST 24 HOURS: 6/10

## 2024-11-26 ENCOUNTER — HOME CARE VISIT (OUTPATIENT)
Dept: HOME HEALTH SERVICES | Facility: HOME HEALTH | Age: 69
End: 2024-11-26
Payer: MEDICARE

## 2024-11-26 PROCEDURE — G0157 HHC PT ASSISTANT EA 15: HCPCS | Mod: CQ

## 2024-11-27 ENCOUNTER — HOME CARE VISIT (OUTPATIENT)
Dept: HOME HEALTH SERVICES | Facility: HOME HEALTH | Age: 69
End: 2024-11-27
Payer: MEDICARE

## 2024-11-27 DIAGNOSIS — R26.89 BALANCE DISORDER: ICD-10-CM

## 2024-11-27 DIAGNOSIS — I63.211 CEREBRAL INFARCTION DUE TO OCCLUSION OF RIGHT VERTEBRAL ARTERY (MULTI): Primary | ICD-10-CM

## 2024-11-27 DIAGNOSIS — R26.9 GAIT ABNORMALITY: ICD-10-CM

## 2024-11-27 PROCEDURE — G0151 HHCP-SERV OF PT,EA 15 MIN: HCPCS

## 2024-11-27 ASSESSMENT — ACTIVITIES OF DAILY LIVING (ADL)
AMBULATION ASSISTANCE ON FLAT SURFACES: 1
HOME_HEALTH_OASIS: 00
OASIS_M1830: 01
AMBULATION_DISTANCE/DURATION_TOLERATED: 150 FT

## 2024-11-27 ASSESSMENT — ENCOUNTER SYMPTOMS
PAIN LOCATION - PAIN SEVERITY: 8/10
PAIN LOCATION: BACK
PERSON REPORTING PAIN: PATIENT
PAIN: 1

## 2024-11-28 ASSESSMENT — ENCOUNTER SYMPTOMS
SUBJECTIVE PAIN PROGRESSION: WAXING AND WANING
PAIN LOCATION: BACK
HIGHEST PAIN SEVERITY IN PAST 24 HOURS: 7/10
PERSON REPORTING PAIN: PATIENT
PAIN: 1
PAIN LOCATION: RIGHT FOOT
PAIN SEVERITY GOAL: 2/10
LOWEST PAIN SEVERITY IN PAST 24 HOURS: 5/10

## 2024-12-03 ENCOUNTER — TELEPHONE (OUTPATIENT)
Dept: PRIMARY CARE | Facility: CLINIC | Age: 69
End: 2024-12-03
Payer: MEDICARE

## 2024-12-03 DIAGNOSIS — I63.9 CEREBRAL INFARCTION, UNSPECIFIED: ICD-10-CM

## 2024-12-03 RX ORDER — CLOPIDOGREL BISULFATE 75 MG/1
75 TABLET ORAL DAILY
Qty: 30 TABLET | Refills: 1 | Status: SHIPPED | OUTPATIENT
Start: 2024-12-03 | End: 2025-12-03

## 2024-12-03 NOTE — TELEPHONE ENCOUNTER
[facility] is currently out of Plavix. In the meantime patient is requesting a 30 day supply to be sent to [facility]. Ty

## 2024-12-06 ENCOUNTER — EVALUATION (OUTPATIENT)
Dept: PHYSICAL THERAPY | Facility: CLINIC | Age: 69
End: 2024-12-06
Payer: MEDICARE

## 2024-12-06 ENCOUNTER — PATIENT OUTREACH (OUTPATIENT)
Dept: PRIMARY CARE | Facility: CLINIC | Age: 69
End: 2024-12-06

## 2024-12-06 DIAGNOSIS — R26.9 GAIT ABNORMALITY: ICD-10-CM

## 2024-12-06 DIAGNOSIS — R26.89 BALANCE DISORDER: ICD-10-CM

## 2024-12-06 DIAGNOSIS — I63.211 CEREBRAL INFARCTION DUE TO OCCLUSION OF RIGHT VERTEBRAL ARTERY (MULTI): ICD-10-CM

## 2024-12-06 PROCEDURE — 97110 THERAPEUTIC EXERCISES: CPT | Mod: GP | Performed by: GENERAL ACUTE CARE HOSPITAL

## 2024-12-06 PROCEDURE — 97161 PT EVAL LOW COMPLEX 20 MIN: CPT | Mod: GP | Performed by: GENERAL ACUTE CARE HOSPITAL

## 2024-12-06 ASSESSMENT — PATIENT HEALTH QUESTIONNAIRE - PHQ9
2. FEELING DOWN, DEPRESSED OR HOPELESS: NOT AT ALL
1. LITTLE INTEREST OR PLEASURE IN DOING THINGS: NOT AT ALL
SUM OF ALL RESPONSES TO PHQ9 QUESTIONS 1 AND 2: 0

## 2024-12-06 ASSESSMENT — 10 METER WALK TEST (10METWT): AVERAGE: 1.0

## 2024-12-06 NOTE — PROGRESS NOTES
Physical Therapy    Physical Therapy Evaluation and Treatment      Patient Name: Jahaira Moore  MRN: [id number]  Today's Date: 12/6/2024  Time Calculation  Start Time: 1100  Stop Time: 1145  Time Calculation (min): 45 min    Insurance - reviewed   Visit number: 1 (updated 12/06/24)   Payor: UNITED HEALTHCARE MEDICARE / Plan: UNITED HEALTHCARE MEDICARE / Product Type: *No Product type* /    $20 COPAY VISITS ARE MED NEC NEEDS AUTH [facility] PAYS % OOP 3500.00 1943.67 APPLIED   Referring Provider: Rupesh Campbell DO       Assessment:      Jahaira Moore  is a 69 y.o. old patient who participated in a physical therapy evaluation today s/p CVA on 10/15/24. Jahaira presents with gait deficits, pain, decreased strength, and decreased functional mobility.   Due to these impairments, she has the following functional limitations and participation restrictions:  increased fall risk, difficulty with ambulation, difficulty with stair negotiation, and increased time to complete ADLs/IADLs. She is at increased fall risk based on 5xSTS, and FGA scores Jahaira's clinical presentation is Stable and/or uncomplicated characteristics with the level of complexity being low. Jahaira's potential for rehab is good.  Skilled physical therapy services are appropriate and beneficial in order to achieve measurable and meaningful change in the objective tests and measures. Utilization of skilled physical therapy services will aid in advancing her functional status and attaining her therapy-related goals. Jahaira verbalized understanding and is in agreement with all goals and plan of care.  Jahaira left session with all questions answered and no increase in symptoms.      Plan:  OP PT Plan  Treatment/Interventions: Cryotherapy, Dry needling, Education/ Instruction, Gait training, Manual therapy, Neuromuscular re-education, Self care/ home management, Therapeutic activities, Therapeutic exercises  PT Plan: Skilled PT  PT Frequency:  (1-2 times per week)  Duration:  4-8 weeks  Rehab Potential: Good  Plan of Care Agreement: Patient  Next session: consider further assessment of chronic glute pain as this may be a barrier to gait training, needs updated HEP for strengthening.    Current Problem:   Problem List Items Addressed This Visit             ICD-10-CM    Cerebral infarction due to occlusion of right vertebral artery (Multi) I63.211    Relevant Orders    Follow Up In Physical Therapy     Other Visit Diagnoses         Codes    Gait abnormality     R26.9    Relevant Orders    Follow Up In Physical Therapy    Balance disorder     R26.89    Relevant Orders    Follow Up In Physical Therapy              Subjective    General:       Jahaira Moore  is a 69 y.o. female  presenting to the clinic s/p CVA on 10/15/24.  She does feel she is at her baseline but would like PT evaluation.  She did have 2 falls during onset of CVA.      Jahaira Moore  denies:  numbness and tingling    Prior Treatment/diagnostic images: She had subacute rehab, then home health PT.    IMPRESSION:  MRI Brain:  1.  Discontinuous nodular foci of diffusion restriction are present  within the a geographic territory in the inferior medial right  cerebellum, consistent with acute to early subacute infarcts. There  is slight local mass effect without evidence of hemorrhagic  transformation.  2. No supratentorial acute infarction is identified.  3. Scattered areas of hyperintense FLAIR and T2 signal are present in  the periventricular and subcortical white matter of bilateral  cerebral hemispheres are nonspecific, although favored to represent  chronic sequela of microvascular disease.      MRA:  1.  Asymmetrically diminished flow enhancement is present in the  expected location of the extracranial right vertebral artery compared  to the contralateral left side proximally, with no flow enhancement  identified in the vessel more distally.  2. Fat saturated T1 images of the neck are significantly degraded by  motion, however  they demonstrate asymmetric increased T1 signal  within the lumen of the right vertebral artery compared to the  contralateral left side suggestive of an underlying hematoma and  dissection.  3. No significant stenosis is present in the carotid arteries in the  neck or visualized more distal anterior or posterior intracranial  circulation.    Medical History Form: Reviewed (scanned into chart)    Living Environment: multi-level house, 2nd floor bedroom and bathroom.  Lives alone with cat. Basement laundry with railings. 1st floor 1/2 bath.     Occupation: Retired OT     Prior Level of Function: IND at cane level in community, no cane within the home    Exercise: No    Functional Limitations: limitations more limited by back pain.    Pt goals for therapy:  to assess need for additional PT.    Precautions:  Fall Risk: Low   Denies: Cancer, Pacemaker, Diabetes, other known cardiac problems, other known neurologic problem, and other known pulmonary problems  Past Surgical history: B TKR  Past Medical history: Hypertension, Compression fractures     Pain:  7-8/10  pain location: left glute- chronic and baseline, uses cane secondary to this pain         Objective     Gait: IND with + Trendelenberg on the L    Transfers: IND with B UE but able to stand without UE    Myotomes B UE/LE: observed >/= 3/5 throughout and symmetrical    B UE/LE AROM: observed symmetrical and WFL during myotome testing     Outcome Measures:  FGA - Functional Gait Assessment  Gait level surface: 2  Change in gait speed: 3  Gait with horizontal head turns: 1  Gait with vertical head turns: 1  Gait and pivot turn: 2  Step over obstacle: 1  Gait with narrow base of support: 3  Gait with eyes closed: 2  Ambulating backwards: 1  Steps: 2  FGA Total Score: 18    Other Measures  5x Sit to Stand: 12  10M Walk Test: 1.0  Activities - Specific Balance Confidence Scale: 85     Treatments:    Therapeutic Exercise:  10 minutes    Reviewed HEP from HHPT:  standing hip abd/ext (Jahaira to hold due to increase in LBP and orally added clamshells but review next session), mini squad and mini lunges, HR/TR.   -Educated Jahaira  on the role of PT and PT POC  -Educated Jahaira regarding benefit and purpose of skilled PT services along with results of examination findings and how this correlates to their chief complaint.   -Answered Jahaira's questions in full.  -Educated Jahaira on relevant anatomy and the rationale for exercises, evidence for walking balance improvement post CVA including high intensity gait training.  -Jahaira Moore advised to hold any ex if it increases pain, Jahaira Moore verbalized understanding    EDUCATION:  Outpatient Education  Individual(s) Educated: Patient  Education Provided: Anatomy, Fall Risk, Home Exercise Program, Physiology, POC  Risk and Benefits Discussed with Patient/Caregiver/Other: yes  Patient/Caregiver Demonstrated Understanding: yes  Plan of Care Discussed and Agreed Upon: yes  Patient Response to Education: Patient/Caregiver Verbalized Understanding of Information    Goals:        STG's (within 2 weeks)      Jahaira Moore will demonstrate independence,  excellent understanding of and report compliance with at least 3 exercises in her HEP to facilitate the learning process to maximize PT benefits and to facilitate independent rehab program upon discharge.    LTG's (by discharge)      Jahaira will perform 5x sit to  < 12 seconds to decrease fall risk (15 seconds is the cutoff score for risk of recurrent falls).   Jahaira will increase FGA to >/= 23/30 to decrease fall risk and improve safety/IND at home. The Functional Gait Assessment(FGA) is 10-item test that assesses dynamic balance and postural stability during gait.  It is used to assessed the following diagnoses: Stroke, Parkinson's Disease, SCI's, Vestibular Disorders, and Geriatrics.  A score of < 22/30 indicates Increased fall risk. Baseline: 18/30  Jahaira will ambulate on even surfaces mod I using  cane for community distances without imbalance to improve ease of functional mobility, participation in ADLs and other household recreational activities, and to enhance access to the community during IADLs.   Jahaira will be able to ascend/descend >/= 8 stairs with 1 handrail reciprocally without increase in pain or instability in order for Jahaira to increase safe access to all levels of home and with ambulation within the community.   Jahaira will demonstrate independence and report compliance with HEP to facilitate independent rehab program upon discharge for long-term maintenance of condition and gains from PT POC.        Time Calculation  Start Time: 1100  Stop Time: 1145  Time Calculation (min): 45 min  PT Evaluation Time Entry  PT Evaluation (Low) Time Entry: 35  PT Therapeutic Procedures Time Entry  Therapeutic Exercise Time Entry: 10

## 2024-12-31 NOTE — PROGRESS NOTES
Physical Therapy    Physical Therapy Treatment      Patient Name: Jahaira Moore  MRN: [id number]  Today's Date: 1/2/2025  Time Calculation  Start Time: 0937  Stop Time: 1020  Time Calculation (min): 43 min    Insurance - reviewed   Visit number: 2 (updated 01/02/25)   Payor: UNITED HEALTHCARE MEDICARE / Plan: UNITED HEALTHCARE MEDICARE / Product Type: *No Product type* /    $20 COPAY VISITS ARE MED NEC NEEDS AUTH [facility] PAYS % OOP 3500.00 1943.67 APPLIED   **PT AUTH 16V 12/6/24-1/31/25**   Referring Provider: Rupesh Campbell DO         Current Problem  Problem List Items Addressed This Visit             ICD-10-CM       Neuro    Cerebral infarction due to occlusion of right vertebral artery (Multi) I63.211     Other Visit Diagnoses         Codes    Gait abnormality     R26.9    Balance disorder     R26.89          Precautions:  Fall Risk: Low   Denies: Cancer, Pacemaker, Diabetes, other known cardiac problems, other known neurologic problem, and other known pulmonary problems  Past Surgical history: B TKR  Past Medical history: Hypertension, Compression fractures, +osteoporosis, +blood thinner use    General  Subjective      Jahaira Moore denies any falls or complications since last session. Jahaira Moore reports she is doing pretty good in general. She does lose her balance at times but is able to catch herself. She reports she was using SPC prior to CVA. Feels both her legs are affected from the stroke. Has numbness on the R side of her face. Having tenderness on her scalp as well -doing some de-sensitization exercise.   Standing and walking irritate her LBP/L glute pain. Takes about 2 hours in the AM to loosen up this pain she reports.   Sitting in the proper spot/posture with her body shifted to the L will decrease her pain she reports.   Reports relief with cat/cow pose.     Jahaira Moore reports good compliance with HEP from HHPT. Some counter exercises.     Pain:  6-7/10  Pain location: LBP/L glute      Objective      Treatments:  Abbreviation Key  NC = not completed this visit   NV = next visit  // = parallel    Assigned HEP- [employer] Access Code: [email or web address]    Therapeutic Exercise:  15 minutes    Access Code: [email or web address]  Completed 12-15 reps of each exercise in clinic:   - Lying Prone  - 3-4 x daily - 7 x weekly - 3-5 minutes hold  - Prone Press Up on Elbows  - 3-4 x daily - 7 x weekly - 1 sets - 10 reps - 1-2 seconds hold  - Supine Gluteal Sets  - 1 x daily - 7 x weekly - 2 sets - 10 reps - 1-2 seconds hold  - Supine Hip Adduction Isometric with Ball with added TA pull in - 1 x daily - 7 x weekly - 3 sets - 10 reps - 1-2 seconds hold  - Hooklying Clamshell with RTB Resistance  - 1 x daily - 7 x weekly - 2 sets - 15 reps - 1-2 seconds hold  Attempted s/l clamshells - mild pain, HELD     Therapeutic Activity: 28 minutes  Vital Start of tx  BP = 142/87  HR = 66    Lumbar Evaluation  Myotomes/Strength (MMT in sitting):  Hip Flex (L2) R/L: 4+/4-* L glute, LBP pain   Hip Add R/L:  4/4  Hip Abd R/L: 4/4-  Knee Ext (L3) R/L: 4/4-  Knee Flex R/L: 4/4  Ankle DF (L4) R/L: 4+/4-  Abdominals (sitting unsupported): 4   Back Extensors (sitting unsupported):  4    Range of Motion/Repeated Movements:  Standing Lumbar Flexion: mild pain; to floor   Repeated Loaded Flexion: NE   Standing Lumbar Ext: WFL, pain present   Repeated Loaded Ext: INC pain  Side Bend R/L: WFL  / min limited with mild pain   Repeated mini lumbar flexion (elbows to thigh): NE   Prone lie: decreases pain to 3/10   MAICOL: decreases pain to 3/10     Palpation: INC soreness upon palpation to L superior gluteal region  Pelvic assessment: L LE appeared longer than R LE at medial malleolus and ASIS      Outpatient Education: Home exercise program instructed and issued.   Jahaira Moore verbalizes understanding of all education provided: Yes    Assessment:  Jahaira Teresa completed treatment today which focused upon assessment of LBP/L glute pain in order to address her chronic pain in this  region which impacts her functional mobility.  Jahaira presents with potential unloaded lumbar spine extension  bias as this decreased her pain levels to 3/10 when performing - issued to Hep and will monitor carry over. Pelvic asymmetries were also present and may be contributing to symptoms as well.  Jahaira requires min cues for proper form with therex today.  Jahaira was challenged with  proper TA isolation and proximal hip strengthening.  Jahaira's  response to tx was:  reduced pain levels to 3/10 post tx . Jahaira's Progress towards goals:  improved knowledge and understanding, decreased pain . Jahaira Moore continues to have gait deficits, balance deficits, and pain limiting participation in household chores, recreational activities, exercise, attending medical appointments, and shopping within the community. Further skilled therapy services are warranted to address the remaining impairments in order to progress towards functional goals. Jahaira left session with all questions answered and no increase in symptoms.      Plan: Assess response to HEP updates and use of prone lying. Consider pelvic assessment/MET since asymmetries were observed.   Gait and balance training as LBP allows.       Time Calculation  Start Time: 0937  Stop Time: 1020  Time Calculation (min): 43 min     PT Therapeutic Procedures Time Entry  Therapeutic Exercise Time Entry: 15  Therapeutic Activity Time Entry: 28

## 2025-01-02 ENCOUNTER — TREATMENT (OUTPATIENT)
Dept: PHYSICAL THERAPY | Facility: CLINIC | Age: 70
End: 2025-01-02
Payer: MEDICARE

## 2025-01-02 DIAGNOSIS — R26.9 GAIT ABNORMALITY: ICD-10-CM

## 2025-01-02 DIAGNOSIS — I63.211 CEREBRAL INFARCTION DUE TO OCCLUSION OF RIGHT VERTEBRAL ARTERY (MULTI): ICD-10-CM

## 2025-01-02 DIAGNOSIS — R26.89 BALANCE DISORDER: ICD-10-CM

## 2025-01-02 PROCEDURE — 97110 THERAPEUTIC EXERCISES: CPT | Mod: GP

## 2025-01-02 PROCEDURE — 97530 THERAPEUTIC ACTIVITIES: CPT | Mod: GP
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
## 2025-01-07 ENCOUNTER — TREATMENT (OUTPATIENT)
Dept: PHYSICAL THERAPY | Facility: CLINIC | Age: 70
End: 2025-01-07
Payer: MEDICARE

## 2025-01-07 DIAGNOSIS — R26.89 BALANCE DISORDER: ICD-10-CM

## 2025-01-07 DIAGNOSIS — R26.9 GAIT ABNORMALITY: Primary | ICD-10-CM

## 2025-01-07 DIAGNOSIS — I63.211 CEREBRAL INFARCTION DUE TO OCCLUSION OF RIGHT VERTEBRAL ARTERY (MULTI): ICD-10-CM

## 2025-01-07 PROCEDURE — 97140 MANUAL THERAPY 1/> REGIONS: CPT | Mod: GP | Performed by: GENERAL ACUTE CARE HOSPITAL

## 2025-01-07 PROCEDURE — 97110 THERAPEUTIC EXERCISES: CPT | Mod: GP | Performed by: GENERAL ACUTE CARE HOSPITAL

## 2025-01-09 ENCOUNTER — APPOINTMENT (OUTPATIENT)
Dept: PHYSICAL THERAPY | Facility: CLINIC | Age: 70
End: 2025-01-09
Payer: MEDICARE

## 2025-01-16 ENCOUNTER — TREATMENT (OUTPATIENT)
Dept: PHYSICAL THERAPY | Facility: CLINIC | Age: 70
End: 2025-01-16
Payer: MEDICARE

## 2025-01-16 DIAGNOSIS — R26.9 GAIT ABNORMALITY: ICD-10-CM

## 2025-01-16 DIAGNOSIS — R26.89 BALANCE DISORDER: ICD-10-CM

## 2025-01-16 DIAGNOSIS — I63.211 CEREBRAL INFARCTION DUE TO OCCLUSION OF RIGHT VERTEBRAL ARTERY (MULTI): ICD-10-CM

## 2025-01-16 PROCEDURE — 97140 MANUAL THERAPY 1/> REGIONS: CPT | Mod: GP | Performed by: INTERNAL MEDICINE

## 2025-01-16 PROCEDURE — 97110 THERAPEUTIC EXERCISES: CPT | Mod: GP | Performed by: INTERNAL MEDICINE

## 2025-01-16 NOTE — PROGRESS NOTES
Physical Therapy    Physical Therapy Treatment      Patient Name: Jahaira Moore  MRN: [id number]  Today's Date: 1/16/2025  Time Calculation  Start Time: 1037  Stop Time: 1115  Time Calculation (min): 38 min    Insurance - reviewed   Visit number: 4/16 (updated 01/16/25)   Payor: [employer]ICARE / Plan: [employer] MEDICARE / Product Type: *No Product type* /    $20 COPAY VISITS ARE MED NEC NEEDS AUTH [employer] PAYS % OOP 3500.00 1943.67 APPLIED   **PT AUTH 16V 12/6/24-1/31/25**   Referring Provider: Rupesh Campbell DO       Current Problem  Problem List Items Addressed This Visit             ICD-10-CM       Neuro    Cerebral infarction due to occlusion of right vertebral artery (Multi) I63.211     Other Visit Diagnoses         Codes    Gait abnormality     R26.9    Balance disorder     R26.89          Precautions:  Fall Risk: Low   Denies: Cancer, Pacemaker, Diabetes, other known cardiac problems, other known neurologic problem, and other known pulmonary problems  Past Surgical history: B TKR  Past Medical history: Hypertension, Compression fractures, +osteoporosis, +blood thinner use    General  Subjective      Jahaira Moore denies any falls or complications since last session. Jahaira Moore reports feeling a lot better after MET last session. In to session with SPC.  Jahaira Moore reports compliance with HEP and presents with questions about HEP.      Pain:  3/10  Pain location: LBP/L>R glute    Palpation:  R elevated IT, R elevated MM, pubis level    Treatments:  Abbreviation Key  NC = not completed this visit   NV = next visit  // = parallel    Assigned HEP- Medbridge Access Code: [email or web address]    Therapeutic Exercise:  10 minutes    Nu-step lvl 4 x5 mins; LE warm-up and subjective; pt reports increase in LBP to 4-5/10 after activity  Lying Prone x3 mins; 2/10  Prone Press Up on Elbows x2 mins    The following were NC this session:   Supine roll for control 10x with 10 sec hold - added to HEP      Manual  Therapy:  28 minutes    **osteoporosis**  **hx of compression fractures, but none are identified in the lumbar spine per most recent MRI 10/15/24**  Pt prone:  STM/DTM + myofascial cupping with external gliding to L lumbar paraspinals, around L PSIS, and L superior glute max    The following were NC this session:   Self MET for pelvis 3 x 10 sec  Mild improvement after 1 cycle  MET to correct L depressed IT  2 cycles with mild improvement  Time spent on positioning, assessing pelvic landmarks included        Outpatient Education:   - cues/rationale for there-ex and manual tx interventions  - verbally added self massage with tennis ball on wall     Jahaira Moore verbalizes understanding of all education provided: Yes    Assessment:  Jahaira Moore completed treatment today which focused upon prone lying progression and cont manual techniques in order to address L > R-sided LB and pelvic pain.  Ajhaira cont to present with unloaded lumbar spine extension bias as this decreased her pain in prone after Nu-step activity. Pt demos soft tissue restriction around L PSIS and tenderness in L lateral, superior glute; abolished post manual tx interventions. Verbally added self massage with tennis ball on wall to HEP to address ongoing restriction independently.  Pt reports 0/10 pain at end of session. Patient tolerated therapeutic interventions well and without any adverse events. Improved independence with home exercises.  Jahaira's Progress towards goals: Patient reports there has not been a significant change in functional abilities.. Jahaira Moore continues to have pelvic asymmetries, gait deficits, balance deficits, decreased strength, and pain limiting participation in household chores and recreational activities. Further skilled therapy services are warranted to address the remaining impairments in order to progress towards functional goals. Jahaira left session with all questions answered and no increase in symptoms.      Plan:   Progress  prone lying as maria elena. Monitor response to manual/STM and cont if beneficial; repeat MET if still or more beneficial  Gait and balance training as LBP allows.       Time Calculation  Start Time: 1037  Stop Time: 1115  Time Calculation (min): 38 min     PT Therapeutic Procedures Time Entry  Manual Therapy Time Entry: 28  Therapeutic Exercise Time Entry: 10

## 2025-01-23 ENCOUNTER — DOCUMENTATION (OUTPATIENT)
Dept: PHYSICAL THERAPY | Facility: CLINIC | Age: 70
End: 2025-01-23
Payer: MEDICARE

## 2025-01-23 ENCOUNTER — TREATMENT (OUTPATIENT)
Dept: PHYSICAL THERAPY | Facility: CLINIC | Age: 70
End: 2025-01-23
Payer: MEDICARE

## 2025-01-23 DIAGNOSIS — R26.89 BALANCE DISORDER: ICD-10-CM

## 2025-01-23 DIAGNOSIS — I63.211 CEREBRAL INFARCTION DUE TO OCCLUSION OF RIGHT VERTEBRAL ARTERY (MULTI): ICD-10-CM

## 2025-01-23 DIAGNOSIS — R26.9 GAIT ABNORMALITY: Primary | ICD-10-CM

## 2025-01-23 NOTE — PROGRESS NOTES
Physical Therapy                       Therapy Communication Note    Patient Name: Jahaira Moore  MRN: [id number]  Today's Date: 1/23/2025     Discipline: Physical Therapy    Missed Visit Reason:  weather    Missed Time: Cancel

## 2025-01-27 ENCOUNTER — TELEPHONE (OUTPATIENT)
Dept: PRIMARY CARE | Facility: CLINIC | Age: 70
End: 2025-01-27
Payer: MEDICARE

## 2025-01-27 NOTE — TELEPHONE ENCOUNTER
Patient is calling in states she has been getting frequent headaches that tylenol does not help.  Asking for something to be called in.     Pharmacy verified.     Please advise.

## 2025-01-30 ENCOUNTER — TREATMENT (OUTPATIENT)
Dept: PHYSICAL THERAPY | Facility: CLINIC | Age: 70
End: 2025-01-30
Payer: MEDICARE

## 2025-01-30 DIAGNOSIS — R26.89 BALANCE DISORDER: ICD-10-CM

## 2025-01-30 DIAGNOSIS — I63.211 CEREBRAL INFARCTION DUE TO OCCLUSION OF RIGHT VERTEBRAL ARTERY (MULTI): ICD-10-CM

## 2025-01-30 DIAGNOSIS — R26.9 GAIT ABNORMALITY: ICD-10-CM

## 2025-01-30 PROCEDURE — 97530 THERAPEUTIC ACTIVITIES: CPT | Mod: GP | Performed by: GENERAL ACUTE CARE HOSPITAL

## 2025-01-30 ASSESSMENT — 10 METER WALK TEST (10METWT): AVERAGE: 1.0

## 2025-01-30 NOTE — PROGRESS NOTES
Physical Therapy    Physical Therapy Treatment      Patient Name: Jahaira Moore  MRN: [id number]  Today's Date: 1/30/2025  Time Calculation  Start Time: 1019  Stop Time: 1059  Time Calculation (min): 40 min    Insurance - reviewed   Visit number: 5/16 (updated 01/30/25)   Payor: [employer] MEDICARE / Plan: [employer] MEDICARE / Product Type: *No Product type* /    $20 COPAY VISITS ARE MED NEC NEEDS AUTH [employer] PAYS % OOP 3500.00 1943.67 APPLIED   **PT AUTH 16V 12/6/24-1/31/25**   Referring Provider: Rupesh Campbell DO       Current Problem  Problem List Items Addressed This Visit             ICD-10-CM    Cerebral infarction due to occlusion of right vertebral artery (Multi) I63.211     Other Visit Diagnoses         Codes    Gait abnormality     R26.9    Balance disorder     R26.89          Precautions:  Fall Risk: Low   Denies: Cancer, Pacemaker, Diabetes, other known cardiac problems, other known neurologic problem, and other known pulmonary problems  Past Surgical history: B TKR  Past Medical history: Hypertension, Compression fractures, +osteoporosis, +blood thinner use    General  Subjective    Jahaira Moore denies any falls or complications since last session. Jahaira Moore reports that she has been doing better and feels she is at her baseline and is ready to be discharged with IND HEP.    Jahaira Moore reports compliance with HEP as able    Pain:  4-5/10  Pain location: low back    Objective  FGA - Functional Gait Assessment  Gait level surface: 2  Change in gait speed: 3  Gait with horizontal head turns: 1  Gait with vertical head turns: 1  Gait and pivot turn: 2  Step over obstacle: 2  Gait with narrow base of support: 3  Gait with eyes closed: 2  Ambulating backwards: 2  Steps: 3  FGA Total Score: 21    Other Measures  5x Sit to Stand: 12  10M Walk Test: 1.0      Treatments:    Assigned HEP- Medbridge Access Code: [email or web address]    Therapeutic Activity: 40 minutes   Assessment of Jahaira Moore's POC goals  completed today- see updated goals, objective, and assessment for further information. Educated Jahaira Moore  regarding results of examination findings and discussed next steps in PT POC. Educated Jahaira Moore regarding importance of continuing with good HEP compliance for optimal rehab results.     Outpatient Education: see above  Jahaira Moore verbalizes understanding of all education provided: Yes    Assessment:  Jahaira Moore  has completed 5 physical therapy sessions to address back pain and unsteadiness s/p CVA on 10/15/24.  Treatment has included: therapeutic exercise, therapeutic activity, neuromuscular re-education, and manual therapy.  Jahaira reports compliance with the prescribed physical therapy home exercise program.  Jahaira has made progress towards therapy goals and feels ready to transition to IND HEP.      At this time I recommend: Jahaira be discharged from physical therapy and to continue with assigned HEP.   Jahaira Moore is in agreement with plan.      Jahaira  demonstrated good understanding of HEP and importance of continuing to maintain gains made from PT POC. Discussion with Jahaira today regarding discharge from physical therapy and she is agreeable to discharge. Jahaira Moore  does not require further skilled therapy services because remaining functional deficits can be addressed through HEP administered. Jahaira left session with all questions answered and no increase in symptoms.    Goals:      STG's (within 2 weeks)    Jahaira Moore will demonstrate independence,  excellent understanding of and report compliance with at least 3 exercises in her HEP to facilitate the learning process to maximize PT benefits and to facilitate independent rehab program upon discharge.  Goal MET 1/30/2025.       LTG's (by discharge)    Jahaira will perform 5x sit to  < 12 seconds to decrease fall risk (15 seconds is the cutoff score for risk of recurrent falls). Goal Not Met 1/30/2025.  Unchanged.    Jahaira will increase FGA to >/= 23/30 to  "decrease fall risk and improve safety/IND at home. The Functional Gait Assessment(FGA) is 10-item test that assesses dynamic balance and postural stability during gait.  It is used to assessed the following diagnoses: Stroke, Parkinson's Disease, SCI's, Vestibular Disorders, and Geriatrics.  A score of < 22/30 indicates Increased fall risk. Baseline: 18/30.  Goal In Progress 1/30/2025.  Jahaira Moore increased score from baseline 18 to 21.    Jahaira will ambulate on even surfaces mod I using cane for community distances without imbalance to improve ease of functional mobility, participation in ADLs and other household recreational activities, and to enhance access to the community during IADLs.  Goal MET 1/30/2025.   Jahaira reports that she feels \"whole body lightheadedness\" mainly in the morning.  After discussion, Jahaira to monitor BP based on questioning.    Jahaira will be able to ascend/descend >/= 8 stairs with 1 handrail reciprocally without increase in pain or instability in order for Jahaira to increase safe access to all levels of home and with ambulation within the community. Goal MET 1/30/2025.   Jahaira reports able to go up/down stairs at her baseline at home. No rail needed in clinic.  Jahaira will demonstrate independence and report compliance with HEP to facilitate independent rehab program upon discharge for long-term maintenance of condition and gains from PT POC.  Goal MET 1/30/2025.       Plan:   Jahaira be discharged from physical therapy and to continue with assigned HEP.   Jahaira Moore is in agreement with plan.           Time Calculation  Start Time: 1019  Stop Time: 1059  Time Calculation (min): 40 min     PT Therapeutic Procedures Time Entry  Therapeutic Activity Time Entry: 40                    "

## 2025-02-10 ENCOUNTER — APPOINTMENT (OUTPATIENT)
Dept: VASCULAR SURGERY | Facility: CLINIC | Age: 70
End: 2025-02-10
Payer: MEDICARE

## 2025-02-13 ENCOUNTER — APPOINTMENT (OUTPATIENT)
Dept: VASCULAR SURGERY | Facility: CLINIC | Age: 70
End: 2025-02-13
Payer: MEDICARE

## 2025-02-27 ENCOUNTER — APPOINTMENT (OUTPATIENT)
Dept: NEUROLOGY | Facility: CLINIC | Age: 70
End: 2025-02-27
Payer: MEDICARE

## 2025-02-27 VITALS
TEMPERATURE: 97 F | WEIGHT: 147 LBS | HEIGHT: 63 IN | HEART RATE: 68 BPM | SYSTOLIC BLOOD PRESSURE: 144 MMHG | RESPIRATION RATE: 16 BRPM | BODY MASS INDEX: 26.05 KG/M2 | DIASTOLIC BLOOD PRESSURE: 90 MMHG

## 2025-02-27 DIAGNOSIS — I63.211 CEREBROVASCULAR ACCIDENT (CVA) DUE TO OCCLUSION OF RIGHT VERTEBRAL ARTERY (MULTI): ICD-10-CM

## 2025-02-27 DIAGNOSIS — R20.2 FACIAL PARESTHESIA: ICD-10-CM

## 2025-02-27 DIAGNOSIS — M48.02 CERVICAL STENOSIS OF SPINAL CANAL: ICD-10-CM

## 2025-02-27 DIAGNOSIS — I63.211 CEREBRAL INFARCTION DUE TO OCCLUSION OF RIGHT VERTEBRAL ARTERY (MULTI): Primary | ICD-10-CM

## 2025-02-27 DIAGNOSIS — I77.74: ICD-10-CM

## 2025-02-27 DIAGNOSIS — R55 SYNCOPE AND COLLAPSE: ICD-10-CM

## 2025-02-27 DIAGNOSIS — R27.0 ATAXIA: ICD-10-CM

## 2025-02-27 DIAGNOSIS — R42 DIZZINESS: ICD-10-CM

## 2025-02-27 RX ORDER — RISEDRONATE SODIUM 35 MG/1
35 TABLET, FILM COATED ORAL
COMMUNITY
Start: 2024-12-10

## 2025-02-27 ASSESSMENT — ENCOUNTER SYMPTOMS
OCCASIONAL FEELINGS OF UNSTEADINESS: 1
DEPRESSION: 0
LOSS OF SENSATION IN FEET: 0

## 2025-02-27 NOTE — PATIENT INSTRUCTIONS
The patient is doing very well from a neurological standpoint with the exception that she has some residual right facial numbness.    The patient needs a Zio patch.  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  The patient does follow with pain management and prior to clearing her for an injection I will need to see the MRA results.  The patient should continue Plavix and aspirin and stroke risk factor modification.    If the dissection has healed and the patient can stop Plavix and discontinue aspirin.  I would like to have a copy of the patient's latest fasting lipid panel.  The patient should continue Lipitor.    The patient is going to follow-up with neurosurgery soon for her cervical stenosis.  The patient has a mildly elevated blood pressure and needs to follow-up with her primary care doctor for this.  I discussed all these issues in detail with the patient and her sister and answered all their questions.  The patient will follow-up with the NP in 6 months.

## 2025-02-27 NOTE — PROGRESS NOTES
Subjective     Jahaira Moore 69 y.o.  HPI  The patient and her sister both attend the appointment today.  The patient was seen by me in the hospital on 10/15/2024.  At that time the patient had the onset of nausea and vomiting at about 8 PM on the night prior to admission.  She then went to bed and woke up around 9 AM and she noted some right facial numbness.  The patient also had difficulties with ambulation and could not stand.  She states that she also tried to crawl but was unable to do so.  She states that she normally would ambulate independently but uses a walker when she is outside the house.  The patient did fall twice.  She subsequently came in through the ER and a stroke team was called.  She is not an IV TNK candidate as she had a low NIH stroke scale score and was out of the time window.  The patient had a CT scan of the brain done that showed no evidence to suggest an acute process.  The patient had a CT angiogram of the neck and brain that showed markedly diminished contrast-enhancement noted in the V2/V3 segment of the right vertebral artery with gradual loss of contrast-enhancement within the V3 segment.  The left intradural vertebral artery and basilar artery were diffusely decreased in luminal caliber but the patient did have prominent bilateral posterior communicating arteries.  The patient had an MRI of the cervical spine that showed degenerative changes but no significant cervical stenosis was noted.    The patient had an MRI of the thoracic spine that showed a new compression deformity at T12.  No significant lumbar stenosis was noted.  The MRI of the brain shows discontinuous nodular foci of diffusion restriction in the inferior medial right cerebellum consistent with infarctions.  The patient's MRA of the brain showed asymmetric diminished flow enhancement in the expected location of the extracranial right vertebral artery compared to the left.  The fat-saturated T1 images of the neck were  degraded however they demonstrated asymmetric increased T1 signal within the lumen of the right vertebral artery compared to the left side suggestive of an underlying hematoma and dissection.  No significant carotid artery stenosis was noted.  The patient had an MRI of the thoracic spine that showed multilevel degenerative changes but no cord abnormality was noted.  I did review the images of the MRI of the brain, MRA of the brain, MRA of the neck as well as MRIs of the cervical, thoracic and lumbar spines with the patient.  The patient had a carotid ultrasound that showed no significant stenosis.  The patient had an echocardiogram that showed a normal EF and no clot was noted.  The patient did not get her Zio patch.    The patient's lipid panel showed a cholesterol of 223 and an LDL of 114.  The patient was placed on a statin and a month later her cholesterol was 180 with an LDL of 92.    The patient went to acute rehab and did very well.  She still having some right facial numbness but feels this is slowly resolving.  The patient is compliant with her aspirin.  Plavix and atorvastatin.    The patient states this past Monday she had an episode where she became dizzy for several seconds and felt as though she was going to pass out but did not.  She has not had any further episodes.      Review of Systems     Patient Active Problem List   Diagnosis    Routine general medical examination at health care facility    Need for vaccination    Personal history of tobacco use, presenting hazards to health    Screening for cardiovascular condition    Ganglion cyst    Agitated depression (Multi)    Cerebral infarction due to occlusion of right vertebral artery (Multi)    Facial paresthesia    Cerebrovascular accident (CVA) due to occlusion of right vertebral artery (Multi)    Ataxia    Chronic renal insufficiency    Hospital discharge follow-up    Other specified disorders of adrenal gland    Chronic renal impairment, stage 3b  (Multi)        Past Medical History:   Diagnosis Date    Arthritis     Other specified disorders of adrenal gland 06/25/2018    Adrenal nodule    Personal history of other diseases of the respiratory system     History of bronchitis    Personal history of other diseases of the respiratory system     History of pharyngitis    Personal history of other infectious and parasitic diseases     History of viral infection    Personal history of other mental and behavioral disorders     History of depression    Personal history of other mental and behavioral disorders     History of depression    Personal history of other specified conditions     History of diarrhea    Personal history of other specified conditions     History of abdominal pain    Stroke (Multi)     Syncope and collapse     Vasovagal syncope    Unspecified osteoarthritis, unspecified site     Osteoarthritis        Past Surgical History:   Procedure Laterality Date    ENDOMETRIAL BIOPSY      HYSTERECTOMY  11/28/2015    Hysterectomy    KNEE SURGERY Bilateral 05/30/2018    Knee Surgery    OTHER SURGICAL HISTORY  11/28/2015    Tympanic Membrane Repair        Social History     Socioeconomic History    Marital status: Single     Spouse name: Not on file    Number of children: Not on file    Years of education: Not on file    Highest education level: Not on file   Occupational History    Not on file   Tobacco Use    Smoking status: Some Days     Current packs/day: 0.25     Types: Cigarettes     Passive exposure: Current    Smokeless tobacco: Never   Vaping Use    Vaping status: Never Used   Substance and Sexual Activity    Alcohol use: Not Currently    Drug use: Never    Sexual activity: Not on file   Other Topics Concern    Not on file   Social History Narrative    Not on file     Social Drivers of Health     Financial Resource Strain: Low Risk  (10/15/2024)    Overall Financial Resource Strain (CARDIA)     Difficulty of Paying Living Expenses: Not hard at all    Food Insecurity: No Food Insecurity (10/15/2024)    Hunger Vital Sign     Worried About Running Out of Food in the Last Year: Never true     Ran Out of Food in the Last Year: Never true   Transportation Needs: No Transportation Needs (11/27/2024)    OASIS : Transportation     Lack of Transportation (Medical): No     Lack of Transportation (Non-Medical): No     Patient Unable or Declines to Respond: No   Physical Activity: Not on file   Stress: Not on file   Social Connections: Feeling Socially Integrated (11/27/2024)    OASIS : Social Isolation     Frequency of experiencing loneliness or isolation: Never   Intimate Partner Violence: Not At Risk (10/15/2024)    Humiliation, Afraid, Rape, and Kick questionnaire     Fear of Current or Ex-Partner: No     Emotionally Abused: No     Physically Abused: No     Sexually Abused: No   Housing Stability: Low Risk  (10/15/2024)    Housing Stability Vital Sign     Unable to Pay for Housing in the Last Year: No     Number of Times Moved in the Last Year: 1     Homeless in the Last Year: No        Family History   Problem Relation Name Age of Onset    Dementia Mother      Stroke Maternal Grandmother          Current Outpatient Medications   Medication Instructions    ARIPiprazole (Abilify) 2 mg tablet 0.5 tablets, Daily    atorvastatin (LIPITOR) 40 mg, oral, Nightly    buPROPion XL (WELLBUTRIN XL) 300 mg, Daily    calcium citrate-vitamin D3 500 mg-12.5 mcg (500 unit) tablet,chewable 1 Dose, 2 times daily    chlorthalidone (HYGROTON) 25 mg, Daily    cholecalciferol (VITAMIN D-3) 25 mcg, Daily    clopidogrel (PLAVIX) 75 mg, oral, Daily    diclofenac sodium (VOLTAREN) 4 g, Topical, 4 times daily PRN    DULoxetine (Cymbalta) 30 mg DR capsule Take 1 capsule (30 mg) by mouth once daily. With 60mg capsule    DULoxetine (CYMBALTA) 60 mg, Daily    gabapentin (NEURONTIN) 300 mg, 3 times daily    hydroCHLOROthiazide (HYDRODIURIL) 25 mg, Daily    Kevzara 200 mg, Every 14 days     "magnesium oxide 500 mg magnesium tablet 1 tablet, Nightly    pantoprazole (PROTONIX) 40 mg, oral, Daily    predniSONE (DELTASONE) 5 mg, Daily    predniSONE (DELTASONE) 3 mg, Daily    risedronate (ACTONEL) 35 mg, Every 14 days    tiZANidine (ZANAFLEX) 4 mg, oral, Every 6 hours PRN    traZODone (Desyrel) 50 mg tablet Take 1 tablet (50 mg) by mouth once daily at bedtime.        Allergies   Allergen Reactions    Latex Itching        Objective  /90 (BP Location: Left arm)   Pulse 68   Temp 36.1 °C (97 °F)   Resp 16   Ht 1.6 m (5' 3\")   Wt 66.7 kg (147 lb)   BMI 26.04 kg/m²    GENERAL APPEARANCE:  No distress, alert and cooperative.     MENTAL STATE:  Orientation was normal to time, place and person. Recent and remote memory was intact.  Attention span and concentration were normal. Language testing was normal for comprehension, repetition, expression, and naming. Calculation was intact. The patient could correctly interpret a picture, and copy a diagram. General fund of knowledge was intact. Mini-mental status examination was performed with no errors.     CRANIAL NERVES:  Cranial nerves were normal.      CN 2- Visual Acuity  OD: 20/20 (corrected)   OS: 20/20 (corrected); visual fields full to confrontation.      CN 3, 4, 6-  Pupils round, 4 mm in diameter, equally reactive to light. No ptosis. EOMs normal alignment, full range of movement, no nystagmus     CN 5- Facial sensation intact bilaterally. Normal corneal reflexes.      CN 7- Normal and symmetric facial strength. Nasolabial folds symmetric.     CN 8- Hearing intact to finger rub, whisper.      CN 9- Palate elevates symmetrically. Normal gag reflex.      CN 11- Normal strength of shoulder shrug and neck turning      CN 12- Tongue midline, with normal bulk and strength; no fasciculations.     MOTOR:  Motor exam was normal. Muscle bulk and tone were normal in both upper and lower extremities. Muscle strength was 5/5 in distal and proximal muscles in both " upper and lower extremities. No fasciculations, tremor or other abnormal movements were present.     GAIT: Station was stable with a normal base and negative Romberg sign. Gait was stable with a normal arm swing and speed. No ataxia, shuffling, steppage or waddling was noted. Tandem gait was intact. Postural reflexes were normal.     Assessment/Plan   The patient is doing very well from a neurological standpoint with the exception that she has some residual right facial numbness.    The patient needs a Zio patch.  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  The patient does follow with pain management and prior to clearing her for an injection I will need to see the MRA results.  The patient should continue Plavix and aspirin and stroke risk factor modification.    If the dissection has healed and the patient can stop Plavix and discontinue aspirin.  I would like to have a copy of the patient's latest fasting lipid panel.  The patient should continue Lipitor.    The patient is going to follow-up with neurosurgery soon for her cervical stenosis.  The patient has a mildly elevated blood pressure and needs to follow-up with her primary care doctor for this.  I discussed all these issues in detail with the patient and her sister and answered all their questions.  The patient will follow-up with the NP in 6 months.

## 2025-03-04 ENCOUNTER — APPOINTMENT (OUTPATIENT)
Dept: VASCULAR SURGERY | Facility: CLINIC | Age: 70
End: 2025-03-04
Payer: MEDICARE

## 2025-03-04 VITALS
HEART RATE: 66 BPM | SYSTOLIC BLOOD PRESSURE: 120 MMHG | OXYGEN SATURATION: 95 % | BODY MASS INDEX: 29.64 KG/M2 | HEIGHT: 61 IN | WEIGHT: 157 LBS | DIASTOLIC BLOOD PRESSURE: 70 MMHG

## 2025-03-04 DIAGNOSIS — I63.9 CEREBRAL INFARCTION, UNSPECIFIED: ICD-10-CM

## 2025-03-04 DIAGNOSIS — I63.211 CEREBRAL INFARCTION DUE TO OCCLUSION OF RIGHT VERTEBRAL ARTERY (MULTI): Primary | ICD-10-CM

## 2025-03-04 PROCEDURE — 1160F RVW MEDS BY RX/DR IN RCRD: CPT | Performed by: SURGERY

## 2025-03-04 PROCEDURE — 3008F BODY MASS INDEX DOCD: CPT | Performed by: SURGERY

## 2025-03-04 PROCEDURE — 99214 OFFICE O/P EST MOD 30 MIN: CPT | Performed by: SURGERY

## 2025-03-04 PROCEDURE — 1159F MED LIST DOCD IN RCRD: CPT | Performed by: SURGERY

## 2025-03-04 RX ORDER — CLOPIDOGREL BISULFATE 75 MG/1
75 TABLET ORAL DAILY
Qty: 30 TABLET | Refills: 5 | Status: SHIPPED | OUTPATIENT
Start: 2025-03-04 | End: 2025-08-31

## 2025-03-04 ASSESSMENT — ENCOUNTER SYMPTOMS
HEADACHES: 0
EYES NEGATIVE: 1
DIZZINESS: 0
COLOR CHANGE: 0
PSYCHIATRIC NEGATIVE: 1
SHORTNESS OF BREATH: 0
GASTROINTESTINAL NEGATIVE: 1
BACK PAIN: 0
CONSTITUTIONAL NEGATIVE: 1
SPEECH DIFFICULTY: 0
NUMBNESS: 0
BRUISES/BLEEDS EASILY: 0
COUGH: 0
ENDOCRINE NEGATIVE: 1
WEAKNESS: 0
WOUND: 0

## 2025-03-04 NOTE — PROGRESS NOTES
History Of Present Illness  Jahaira Moore is a 70 y.o. female presenting for referral after hospital follow-up.  She was admitted to the hospital 3 months ago with a stroke secondary to vertebral artery occlusion.  This was seen on CTA, MRA, and carotid duplex.  Patient's presenting symptoms are bilateral lower extremity weakness.  She states she has undergone physical therapy and now is walking with a cane.  She continues to take Plavix and Lipitor daily.     Past Medical History  She has a past medical history of Arthritis, Other specified disorders of adrenal gland (06/25/2018), Personal history of other diseases of the respiratory system, Personal history of other diseases of the respiratory system, Personal history of other infectious and parasitic diseases, Personal history of other mental and behavioral disorders, Personal history of other mental and behavioral disorders, Personal history of other specified conditions, Personal history of other specified conditions, Stroke (Multi), Syncope and collapse, and Unspecified osteoarthritis, unspecified site.    Surgical History  She has a past surgical history that includes Hysterectomy (11/28/2015); Other surgical history (11/28/2015); Knee surgery (Bilateral, 05/30/2018); and Endometrial biopsy.     Social History  She reports that she has been smoking cigarettes. She has been exposed to tobacco smoke. She has never used smokeless tobacco. She reports that she does not currently use alcohol. She reports that she does not use drugs.    Family History  Family History   Problem Relation Name Age of Onset    Dementia Mother      Stroke Maternal Grandmother          Allergies  Latex    Review of Systems   Constitutional: Negative.    HENT: Negative.     Eyes: Negative.    Respiratory:  Negative for cough and shortness of breath.    Cardiovascular:  Negative for chest pain and leg swelling.   Gastrointestinal: Negative.    Endocrine: Negative.    Genitourinary: Negative.   "  Musculoskeletal:  Negative for back pain and gait problem.   Skin:  Negative for color change, pallor and wound.   Neurological:  Negative for dizziness, syncope, speech difficulty, weakness, numbness and headaches.   Hematological:  Does not bruise/bleed easily.   Psychiatric/Behavioral: Negative.          Physical Exam  Vitals reviewed.   Constitutional:       General: She is not in acute distress.     Appearance: Normal appearance. She is normal weight.   HENT:      Head: Normocephalic and atraumatic.   Eyes:      Extraocular Movements: Extraocular movements intact.      Conjunctiva/sclera: Conjunctivae normal.   Neck:      Vascular: No carotid bruit.   Cardiovascular:      Rate and Rhythm: Normal rate and regular rhythm.      Pulses: Normal pulses.           Radial pulses are 2+ on the right side and 2+ on the left side.        Femoral pulses are 2+ on the right side and 2+ on the left side.     Heart sounds: Normal heart sounds.   Pulmonary:      Effort: Pulmonary effort is normal.      Breath sounds: Normal breath sounds.   Abdominal:      Palpations: Abdomen is soft.   Musculoskeletal:         General: No swelling. Normal range of motion.      Cervical back: Normal range of motion. No tenderness.   Skin:     General: Skin is warm and dry.   Neurological:      General: No focal deficit present.      Mental Status: She is alert and oriented to person, place, and time.      Cranial Nerves: Cranial nerves 2-12 are intact. No cranial nerve deficit.      Sensory: No sensory deficit.      Motor: Motor function is intact. No weakness.   Psychiatric:         Mood and Affect: Mood normal.         Behavior: Behavior normal.          Last Recorded Vitals  Blood pressure 120/70, pulse 66, height 1.549 m (5' 1\"), weight 71.2 kg (157 lb), SpO2 95%.    Relevant Results      Current Outpatient Medications:     ARIPiprazole (Abilify) 2 mg tablet, Take 0.5 tablets (1 mg) by mouth once daily., Disp: , Rfl:     atorvastatin " (Lipitor) 40 mg tablet, Take 1 tablet (40 mg) by mouth once daily at bedtime., Disp: 90 tablet, Rfl: 3    buPROPion XL (Wellbutrin XL) 300 mg 24 hr tablet, Take 1 tablet (300 mg) by mouth once daily. Do not crush, chew, or split., Disp: , Rfl:     calcium citrate-vitamin D3 500 mg-12.5 mcg (500 unit) tablet,chewable, Chew 1 Dose 2 times a day. Indications: osteoporosis, a condition of weak bones, Disp: , Rfl:     chlorthalidone (Hygroton) 25 mg tablet, Take 1 tablet (25 mg) by mouth once daily., Disp: , Rfl:     cholecalciferol (Vitamin D-3) 25 MCG (1000 UT) capsule, Take 1 capsule (25 mcg) by mouth once daily., Disp: , Rfl:     clopidogrel (Plavix) 75 mg tablet, Take 1 tablet (75 mg) by mouth once daily., Disp: 30 tablet, Rfl: 1    diclofenac sodium (Voltaren) 1 % gel, Apply 4.5 inches (4 g) topically 4 times a day as needed (for wrist pain)., Disp: 100 g, Rfl: 0    DULoxetine (Cymbalta) 30 mg DR capsule, Take 1 capsule (30 mg) by mouth once daily. With 60mg capsule, Disp: , Rfl:     DULoxetine (Cymbalta) 60 mg DR capsule, Take 1 capsule (60 mg) by mouth once daily. With 30mg capsule, Disp: , Rfl:     gabapentin (Neurontin) 300 mg capsule, Take 1 capsule (300 mg) by mouth 3 times a day., Disp: , Rfl:     hydroCHLOROthiazide (HYDRODiuril) 25 mg tablet, Take 1 tablet (25 mg) by mouth once daily., Disp: , Rfl:     magnesium oxide 500 mg magnesium tablet, Take 1 tablet (500 mg) by mouth once daily at bedtime., Disp: , Rfl:     pantoprazole (ProtoNix) 40 mg EC tablet, TAKE 1 TABLET BY MOUTH DAILY, Disp: 100 tablet, Rfl: 2    predniSONE (Deltasone) 1 mg tablet, Take 3 tablets (3 mg) by mouth once daily., Disp: , Rfl:     predniSONE (Deltasone) 5 mg tablet, Take 1 tablet (5 mg) by mouth once daily. With 1mg tablet (Patient not taking: Reported on 2/27/2025), Disp: , Rfl:     risedronate (Actonel) 35 mg tablet, Take 1 tablet (35 mg) by mouth every 14 (fourteen) days., Disp: , Rfl:     sarilumab (Kevzara) 200 mg/1.14 mL  injection, Inject 1.14 mL (200 mg) under the skin every 14 (fourteen) days., Disp: , Rfl:     tiZANidine (Zanaflex) 4 mg tablet, Take 1 tablet (4 mg) by mouth every 6 hours if needed for muscle spasms., Disp: 30 tablet, Rfl: 0    traZODone (Desyrel) 50 mg tablet, Take 1 tablet (50 mg) by mouth once daily at bedtime., Disp: , Rfl:           Assessment/Plan   Diagnoses and all orders for this visit:  Cerebral infarction due to occlusion of right vertebral artery (Multi)      69yo female status post stroke secondary to right vertebral artery occlusion.  Her carotid arteries are patent with no significant stenosis CTA and carotid duplex studies.  No surgical intervention is recommended.  She is to continue medical management with Plavix and statin.  She should also continue physical therapy as recommended and continue her follow-up with neurology.  She may follow-up with the vascular surgery office as needed.       (This note was generated with voice recognition software and may contain errors including spelling, grammar, syntax and missed recognition of what was dictated, of which may not have been fully corrected)        Mirtha Smith MD

## 2025-03-13 ENCOUNTER — HOSPITAL ENCOUNTER (OUTPATIENT)
Dept: CARDIOLOGY | Facility: HOSPITAL | Age: 70
Discharge: HOME | End: 2025-03-13
Payer: MEDICARE

## 2025-03-13 DIAGNOSIS — R55 SYNCOPE AND COLLAPSE: ICD-10-CM

## 2025-03-13 DIAGNOSIS — I63.211 CEREBRAL INFARCTION DUE TO OCCLUSION OF RIGHT VERTEBRAL ARTERY (MULTI): ICD-10-CM

## 2025-03-13 PROCEDURE — 93270 REMOTE 30 DAY ECG REV/REPORT: CPT

## 2025-03-17 ENCOUNTER — HOSPITAL ENCOUNTER (OUTPATIENT)
Dept: RADIOLOGY | Facility: HOSPITAL | Age: 70
Discharge: HOME | End: 2025-03-17
Payer: MEDICARE

## 2025-03-17 DIAGNOSIS — R42 DIZZINESS: ICD-10-CM

## 2025-03-17 DIAGNOSIS — I63.211 CEREBRAL INFARCTION DUE TO OCCLUSION OF RIGHT VERTEBRAL ARTERY (MULTI): ICD-10-CM

## 2025-03-17 DIAGNOSIS — I77.74: ICD-10-CM

## 2025-03-17 PROCEDURE — 70551 MRI BRAIN STEM W/O DYE: CPT

## 2025-03-17 PROCEDURE — 70549 MR ANGIOGRAPH NECK W/O&W/DYE: CPT

## 2025-03-17 PROCEDURE — A9575 INJ GADOTERATE MEGLUMI 0.1ML: HCPCS | Performed by: PSYCHIATRY & NEUROLOGY

## 2025-03-17 PROCEDURE — 70540 MRI ORBIT/FACE/NECK W/O DYE: CPT

## 2025-03-17 PROCEDURE — 2550000001 HC RX 255 CONTRASTS: Performed by: PSYCHIATRY & NEUROLOGY

## 2025-03-17 PROCEDURE — 70544 MR ANGIOGRAPHY HEAD W/O DYE: CPT | Mod: 59

## 2025-03-17 RX ORDER — GADOTERATE MEGLUMINE 376.9 MG/ML
14 INJECTION INTRAVENOUS
Status: COMPLETED | OUTPATIENT
Start: 2025-03-17 | End: 2025-03-17

## 2025-03-17 RX ADMIN — GADOTERATE MEGLUMINE 14 ML: 376.9 INJECTION INTRAVENOUS at 09:51

## 2025-03-24 ENCOUNTER — APPOINTMENT (OUTPATIENT)
Dept: NEUROLOGY | Facility: CLINIC | Age: 70
End: 2025-03-24
Payer: MEDICARE

## 2025-04-22 ENCOUNTER — TELEPHONE (OUTPATIENT)
Dept: NEUROLOGY | Facility: CLINIC | Age: 70
End: 2025-04-22
Payer: MEDICARE

## 2025-04-22 NOTE — TELEPHONE ENCOUNTER
Pt is asking if it is ok for her to be off ASA and plavix for about 5 days so that she can get an injection with pain management, please advise.

## 2025-04-23 ENCOUNTER — APPOINTMENT (OUTPATIENT)
Dept: PAIN MEDICINE | Facility: CLINIC | Age: 70
End: 2025-04-23
Payer: MEDICARE

## 2025-05-07 ENCOUNTER — OFFICE VISIT (OUTPATIENT)
Dept: PAIN MEDICINE | Facility: CLINIC | Age: 70
End: 2025-05-07
Payer: MEDICARE

## 2025-05-07 VITALS
BODY MASS INDEX: 23.92 KG/M2 | HEIGHT: 63 IN | OXYGEN SATURATION: 95 % | DIASTOLIC BLOOD PRESSURE: 81 MMHG | HEART RATE: 75 BPM | RESPIRATION RATE: 15 BRPM | SYSTOLIC BLOOD PRESSURE: 151 MMHG | WEIGHT: 135 LBS | TEMPERATURE: 98.8 F

## 2025-05-07 DIAGNOSIS — M51.26 LUMBAR DISC HERNIATION: Primary | ICD-10-CM

## 2025-05-07 DIAGNOSIS — M54.16 LUMBAR NEURITIS: ICD-10-CM

## 2025-05-07 DIAGNOSIS — M48.062 SPINAL STENOSIS OF LUMBAR REGION WITH NEUROGENIC CLAUDICATION: ICD-10-CM

## 2025-05-07 DIAGNOSIS — M48.061 LUMBAR FORAMINAL STENOSIS: ICD-10-CM

## 2025-05-07 DIAGNOSIS — M47.816 LUMBAR SPONDYLOSIS: ICD-10-CM

## 2025-05-07 PROCEDURE — 99214 OFFICE O/P EST MOD 30 MIN: CPT | Performed by: ANESTHESIOLOGY

## 2025-05-07 ASSESSMENT — PAIN SCALES - GENERAL
PAINLEVEL_OUTOF10: 7
PAINLEVEL_OUTOF10: 7

## 2025-05-07 ASSESSMENT — PAIN - FUNCTIONAL ASSESSMENT: PAIN_FUNCTIONAL_ASSESSMENT: 0-10

## 2025-05-07 ASSESSMENT — PATIENT HEALTH QUESTIONNAIRE - PHQ9
2. FEELING DOWN, DEPRESSED OR HOPELESS: NOT AT ALL
SUM OF ALL RESPONSES TO PHQ9 QUESTIONS 1 AND 2: 0
1. LITTLE INTEREST OR PLEASURE IN DOING THINGS: NOT AT ALL

## 2025-05-07 ASSESSMENT — ENCOUNTER SYMPTOMS
LOSS OF SENSATION IN FEET: 0
OCCASIONAL FEELINGS OF UNSTEADINESS: 1

## 2025-05-07 ASSESSMENT — PAIN DESCRIPTION - DESCRIPTORS: DESCRIPTORS: ACHING;SHARP

## 2025-05-07 NOTE — PROGRESS NOTES
History Of Present Illness  Jahaira Moore is a 70 y.o. female presenting with   Chief Complaint   Patient presents with    Follow-up       Patient follows up for chronic low back pain to the LEFT GROIN and LEFT HIP AREA. She continues to report improved chronic neck pain.    Pt reports she suffered a TIA on 10- and it affected her strength in both legs. She felt weakness and fell. The pt admitted to [facility] via the ED. Pt reports she was admitted for over 2 weeks and is attending PT and has made a good recover. She is now on daily ASA and PLAVIX.      She also has low back pain that does RADIATE INTO HER LEFT buttock and GROIN AREA. The pain WAS in her leg and has since resolved with her injections. She reports her back pain got worse in December of 2020 after a busy holiday work schedule at [employer].      The pain is constant, worse standing or with head tilting activity and better with rest. The pain is sharp, nagging and shooting to the LUE. Denies LE paresthesias, weakness, saddle anesthesia, bowel or bladder incontinence. To manage this pain the patient has attempted over 6 weeks of physical therapy at [facility] in November/December of 2020.      The patients chronic depression on Cymbalta and Wellbutrin and Trazodone. She is on oral daily prednisone from her Rheumatologist.      PAIN SCORE: 3-8 /10.     PSHx  -Hysterectomy  -Right tympanic membrane repair  -Bilateral TKA     SocHx  -Retired occupation/ massotherapy  -Positive HX OF Tob 1 PPW QUIT AS OF DEC 2020.   -Occ EtoH 4-5 glasses of wine a week.      PCP: Dr. Rupesh Campbell   Ref: Neuro  Rheum: Dr. Edgard Patino / Dr. 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    Surgical History  She has a past surgical history that includes Hysterectomy (11/28/2015); Other surgical history (11/28/2015); Knee surgery (Bilateral, 05/30/2018); and Endometrial biopsy.     Social History  She reports that she has been smoking cigarettes. She has been exposed to tobacco smoke. She has never used smokeless tobacco. She reports that she does not currently use alcohol. She reports that she does not use drugs.    Family History  Family History   Problem Relation Name Age of Onset    Dementia Mother      Stroke Maternal Grandmother          Allergies  Latex    Review of Systems    All other systems reviewed and negative for any deficits. Pertinent positives and negatives were considered in the medical decision making process.        Physical Exam  /81   Pulse 75   Temp 37.1 °C (98.8 °F)   Resp 15   Wt 61.2 kg (135 lb)   SpO2 95%   General: Pt appears stated age     Eyes: Conjunctiva non-icteric and lids without obvious rash or drooping. Pupils are symmetric     ENT: External ears are without deformity or rash. Hearing is grossly intact     Neck: No JVD noted, tracheal position midline.      ---pain on tilting her head      Musculoskeletal: Gait is grossly normal     Digits/nails show no clubbing or cyanosis     Exam of muscles/joints/bones shows no gross atrophy and no abnormal/involuntary movements in the head/neckNo asymmetry or masses noted in the head/neck     Stability: no subluxation noted on movement of bilateral upper extremities or head/neck     Strength: 4+/5 in B/L upper extremities , 4/5 in bilateral LE      Range of Motion: WNL      Sensation: In tact      Cranial nerves 2-12 are  grossly intact     Psychiatric: Pt is alert and oriented to time, place and person.       Assessment/Plan   1. Lumbar disc herniation        2. Lumbar foraminal stenosis        3. Lumbar neuritis        4. Lumbar spondylosis        5. Spinal stenosis of lumbar region with neurogenic claudication                 Diagnoses/Problems   · Lumbosacral neuritis (724.4) (M54.17)   · Chronic low back pain (724.2,338.29) (M54.50,G89.29)   · Lumbar disc herniation (722.10) (M51.26)   · Lumbar neuritis (724.4) (M54.16)   · Lumbar spondylosis (721.3) (M47.816)   · Lumbar stenosis (724.02) (M48.061)     Provider Impressions     1. I have previously provided the patient with a list of physical therapy exercises to learn and perform.     She does her stretches twice every day.      We also discussed leg lefts to maintain her muscle mass.      She has continued on over 6 weeks of home therapy exercises with no improvement in her low back pain. She has also failed over 6 weeks of Cymbalta and OTC NSAIDs including Voltaren and Tizanidine.      2. I again reviewed the patients MRI (2-4-2021) findings in detail, including review of the actual images and provided a detailed explanation of the findings using a spine model.      There are disc herniations at L3/4 and L4/5 with foraminal stenosis and a grade I anterolisthesis at both of these levels.      3. I previously reviewed the patients X-ray (2021) findings in detail, including review of the actual images and provided a detailed explanation of the findings using a spine model.      There is a grade I spondylolisthesis of L4 on L5, There is extensive degenerative changes at the L1/2 and L2/3 level. There is mild scoliosis centered at L3.      There was also incidental notation of a thoracic disc herniation at T11/12      4. I would recommend the pt CONTINUE on CYMBALTA to help with nerve related pain. We discussed the risks, benefits, and side effects to this medication including the  mechanism of action and the pt understands and agrees.    Via her psych.      5. I reviewed the patients cervical x-ray findings in detail, including review of the actual images and provided a detailed explanation of the findings using a spine model. There is grade I spondylolisthesis of the cervical spine at C3/4 and C5/6, multilevel cervical spondylosis and cervical foraminal stenosis.      Given the foraminal stenosis on x-ray I would recommend a Cervical MRI. There is spondylolisthesis and multilevel degenerative changes. She has also failed PT and oral Cymbalta for over 6 weeks.      6. The patient is a candidate for an DIXON at C7/T1 versus Cervical facet to treat back and radicular pain. I spent time with the patient discussing all of the risks, benefits, and alternatives to this measure. Including but not limited to spinal infection, epidural hematoma/abscess, paralysis, nerve injury, steroid effects, and spinal headache. The patient understands and agrees to proceed as needed.      7. She quit tobacco as of December 2020 based on our advice.      8. The patient is a candidate for an LESI at L3/4 vs L5/S1 vs T11/12 TESI to treat back and radicular pain. I spent time with the patient discussing all of the risks, benefits, and alternatives to this measure. Including but not limited to spinal infection, epidural hematoma/abscess, paralysis, nerve injury, steroid effects, and spinal headache. The patient understands and agrees to proceed.     Her last lumbar injection was done on 2-, 9- (L5/S1 LESI BEST ONE YET)  5- and she reported 50-80% relief lasting for several weeks, however, her pain has slowly started returned but is more towards her groin area. She reports she was able to stand and walk with less pain, however, since it has returned being active is very painful for her.      She recently suffered a stroke on 10- managed on Plavix and ASA by Dr. Roa.     We will contact  the patients PCP to determine if it is safe to stop ASA and PLAVIX for 7 days prior to procedure. If Pt is to be bridged on Lovenox they will need to be off of Lovenox for 24 hours prior to the procedure. They will also need to have their INR checked the day of the procedure. We did discuss the risks for stopping anticoagulation including, but not limited to any cardiovascular event, embolism, and even death. The patient understands these risks and would like to proceed with the procedure.       9. Recall: The pt did report left hip pain I did previously order bilateral hip joint x-rays done on 4-2-2022 and it showed mild bilateral OA.          I spent time with the patient reviewing their imaging and discussing the risks benefits and alternatives to the above plan. A total of 30 minutes was spent reviewing the data and greater than 50% of that time was with the patient during the face to face encounter discussing treatment options both surgical, non-surgical, and minimally invasive techniques.        Mario Odonnell MD

## 2025-05-07 NOTE — H&P (VIEW-ONLY)
History Of Present Illness  Jahaira Moore is a 70 y.o. female presenting with   Chief Complaint   Patient presents with    Follow-up       Patient follows up for chronic low back pain to the LEFT GROIN and LEFT HIP AREA. She continues to report improved chronic neck pain.    Pt reports she suffered a TIA on 10- and it affected her strength in both legs. She felt weakness and fell. The pt admitted to [facility] via the ED. Pt reports she was admitted for over 2 weeks and is attending PT and has made a good recover. She is now on daily ASA and PLAVIX.      She also has low back pain that does RADIATE INTO HER LEFT buttock and GROIN AREA. The pain WAS in her leg and has since resolved with her injections. She reports her back pain got worse in December of 2020 after a busy holiday work schedule at [employer].      The pain is constant, worse standing or with head tilting activity and better with rest. The pain is sharp, nagging and shooting to the LUE. Denies LE paresthesias, weakness, saddle anesthesia, bowel or bladder incontinence. To manage this pain the patient has attempted over 6 weeks of physical therapy at [email or web address] in November/December of 2020.      The patients chronic depression on Cymbalta and Wellbutrin and Trazodone. She is on oral daily prednisone from her Rheumatologist.      PAIN SCORE: 3-8 /10.     PSHx  -Hysterectomy  -Right tympanic membrane repair  -Bilateral TKA     SocHx  -Retired occupation/ massotherapy  -Positive HX OF Tob 1 PPW QUIT AS OF DEC 2020.   -Occ EtoH 4-5 glasses of wine a week.      PCP: Dr. Rupesh Campbell   Ref: Neuro  Rheum: Dr. Edgard Patino / Dr. 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    Surgical History  She has a past surgical history that includes Hysterectomy (11/28/2015); Other surgical history (11/28/2015); Knee surgery (Bilateral, 05/30/2018); and Endometrial biopsy.     Social History  She reports that she has been smoking cigarettes. She has been exposed to tobacco smoke. She has never used smokeless tobacco. She reports that she does not currently use alcohol. She reports that she does not use drugs.    Family History  Family History   Problem Relation Name Age of Onset    Dementia Mother      Stroke Maternal Grandmother          Allergies  Latex    Review of Systems    All other systems reviewed and negative for any deficits. Pertinent positives and negatives were considered in the medical decision making process.        Physical Exam  /81   Pulse 75   Temp 37.1 °C (98.8 °F)   Resp 15   Wt 61.2 kg (135 lb)   SpO2 95%   General: Pt appears stated age     Eyes: Conjunctiva non-icteric and lids without obvious rash or drooping. Pupils are symmetric     ENT: External ears are without deformity or rash. Hearing is grossly intact     Neck: No JVD noted, tracheal position midline.      ---pain on tilting her head      Musculoskeletal: Gait is grossly normal     Digits/nails show no clubbing or cyanosis     Exam of muscles/joints/bones shows no gross atrophy and no abnormal/involuntary movements in the head/neckNo asymmetry or masses noted in the head/neck     Stability: no subluxation noted on movement of bilateral upper extremities or head/neck     Strength: 4+/5 in B/L upper extremities , 4/5 in bilateral LE      Range of Motion: WNL      Sensation: In tact      Cranial nerves 2-12 are  grossly intact     Psychiatric: Pt is alert and oriented to time, place and person.       Assessment/Plan   1. Lumbar disc herniation        2. Lumbar foraminal stenosis        3. Lumbar neuritis        4. Lumbar spondylosis        5. Spinal stenosis of lumbar region with neurogenic claudication                 Diagnoses/Problems   · Lumbosacral neuritis (724.4) (M54.17)   · Chronic low back pain (724.2,338.29) (M54.50,G89.29)   · Lumbar disc herniation (722.10) (M51.26)   · Lumbar neuritis (724.4) (M54.16)   · Lumbar spondylosis (721.3) (M47.816)   · Lumbar stenosis (724.02) (M48.061)     Provider Impressions     1. I have previously provided the patient with a list of physical therapy exercises to learn and perform.     She does her stretches twice every day.      We also discussed leg lefts to maintain her muscle mass.      She has continued on over 6 weeks of home therapy exercises with no improvement in her low back pain. She has also failed over 6 weeks of Cymbalta and OTC NSAIDs including Voltaren and Tizanidine.      2. I again reviewed the patients MRI (2-4-2021) findings in detail, including review of the actual images and provided a detailed explanation of the findings using a spine model.      There are disc herniations at L3/4 and L4/5 with foraminal stenosis and a grade I anterolisthesis at both of these levels.      3. I previously reviewed the patients X-ray (2021) findings in detail, including review of the actual images and provided a detailed explanation of the findings using a spine model.      There is a grade I spondylolisthesis of L4 on L5, There is extensive degenerative changes at the L1/2 and L2/3 level. There is mild scoliosis centered at L3.      There was also incidental notation of a thoracic disc herniation at T11/12      4. I would recommend the pt CONTINUE on CYMBALTA to help with nerve related pain. We discussed the risks, benefits, and side effects to this medication including the  mechanism of action and the pt understands and agrees.    Via her psych.      5. I reviewed the patients cervical x-ray findings in detail, including review of the actual images and provided a detailed explanation of the findings using a spine model. There is grade I spondylolisthesis of the cervical spine at C3/4 and C5/6, multilevel cervical spondylosis and cervical foraminal stenosis.      Given the foraminal stenosis on x-ray I would recommend a Cervical MRI. There is spondylolisthesis and multilevel degenerative changes. She has also failed PT and oral Cymbalta for over 6 weeks.      6. The patient is a candidate for an DIXON at C7/T1 versus Cervical facet to treat back and radicular pain. I spent time with the patient discussing all of the risks, benefits, and alternatives to this measure. Including but not limited to spinal infection, epidural hematoma/abscess, paralysis, nerve injury, steroid effects, and spinal headache. The patient understands and agrees to proceed as needed.      7. She quit tobacco as of December 2020 based on our advice.      8. The patient is a candidate for an LESI at L3/4 vs L5/S1 vs T11/12 TESI to treat back and radicular pain. I spent time with the patient discussing all of the risks, benefits, and alternatives to this measure. Including but not limited to spinal infection, epidural hematoma/abscess, paralysis, nerve injury, steroid effects, and spinal headache. The patient understands and agrees to proceed.     Her last lumbar injection was done on 2-, 9- (L5/S1 LESI BEST ONE YET)  5- and she reported 50-80% relief lasting for several weeks, however, her pain has slowly started returned but is more towards her groin area. She reports she was able to stand and walk with less pain, however, since it has returned being active is very painful for her.      She recently suffered a stroke on 10- managed on Plavix and ASA by Dr. Roa.     We will contact  the patients PCP to determine if it is safe to stop ASA and PLAVIX for 7 days prior to procedure. If Pt is to be bridged on Lovenox they will need to be off of Lovenox for 24 hours prior to the procedure. They will also need to have their INR checked the day of the procedure. We did discuss the risks for stopping anticoagulation including, but not limited to any cardiovascular event, embolism, and even death. The patient understands these risks and would like to proceed with the procedure.       9. Recall: The pt did report left hip pain I did previously order bilateral hip joint x-rays done on 4-2-2022 and it showed mild bilateral OA.          I spent time with the patient reviewing their imaging and discussing the risks benefits and alternatives to the above plan. A total of 30 minutes was spent reviewing the data and greater than 50% of that time was with the patient during the face to face encounter discussing treatment options both surgical, non-surgical, and minimally invasive techniques.        Mario Odonnell MD

## 2025-05-30 ENCOUNTER — HOSPITAL ENCOUNTER (OUTPATIENT)
Dept: PAIN MEDICINE | Facility: CLINIC | Age: 70
Discharge: HOME | End: 2025-05-30
Payer: MEDICARE

## 2025-05-30 VITALS
SYSTOLIC BLOOD PRESSURE: 152 MMHG | RESPIRATION RATE: 18 BRPM | DIASTOLIC BLOOD PRESSURE: 86 MMHG | OXYGEN SATURATION: 97 % | TEMPERATURE: 98.6 F | HEART RATE: 65 BPM

## 2025-05-30 DIAGNOSIS — M54.16 LUMBAR NEURITIS: ICD-10-CM

## 2025-05-30 PROCEDURE — 62323 NJX INTERLAMINAR LMBR/SAC: CPT | Performed by: ANESTHESIOLOGY

## 2025-05-30 PROCEDURE — 2500000004 HC RX 250 GENERAL PHARMACY W/ HCPCS (ALT 636 FOR OP/ED): Performed by: ANESTHESIOLOGY

## 2025-05-30 PROCEDURE — 2500000005 HC RX 250 GENERAL PHARMACY W/O HCPCS: Performed by: ANESTHESIOLOGY

## 2025-05-30 RX ORDER — TRIAMCINOLONE ACETONIDE 40 MG/ML
INJECTION, SUSPENSION INTRA-ARTICULAR; INTRAMUSCULAR AS NEEDED
Status: COMPLETED | OUTPATIENT
Start: 2025-05-30 | End: 2025-05-30

## 2025-05-30 RX ORDER — ASPIRIN 81 MG/1
TABLET ORAL
COMMUNITY

## 2025-05-30 RX ORDER — SODIUM CHLORIDE 9 MG/ML
INJECTION, SOLUTION INTRAMUSCULAR; INTRAVENOUS; SUBCUTANEOUS AS NEEDED
Status: COMPLETED | OUTPATIENT
Start: 2025-05-30 | End: 2025-05-30

## 2025-05-30 RX ORDER — ASPIRIN 81 MG/1
81 TABLET ORAL
COMMUNITY

## 2025-05-30 RX ORDER — LIDOCAINE HYDROCHLORIDE 5 MG/ML
INJECTION, SOLUTION INFILTRATION; INTRAVENOUS AS NEEDED
Status: COMPLETED | OUTPATIENT
Start: 2025-05-30 | End: 2025-05-30

## 2025-05-30 RX ADMIN — SODIUM CHLORIDE 10 ML: 9 INJECTION, SOLUTION INTRAMUSCULAR; INTRAVENOUS; SUBCUTANEOUS at 14:12

## 2025-05-30 RX ADMIN — LIDOCAINE HYDROCHLORIDE 10 ML: 5 INJECTION, SOLUTION INFILTRATION at 14:12

## 2025-05-30 RX ADMIN — TRIAMCINOLONE ACETONIDE 40 MG: 40 INJECTION, SUSPENSION INTRA-ARTICULAR; INTRAMUSCULAR at 14:12

## 2025-05-30 ASSESSMENT — PAIN DESCRIPTION - DESCRIPTORS: DESCRIPTORS: ACHING

## 2025-05-30 ASSESSMENT — PAIN - FUNCTIONAL ASSESSMENT
PAIN_FUNCTIONAL_ASSESSMENT: 0-10
PAIN_FUNCTIONAL_ASSESSMENT: 0-10

## 2025-05-30 ASSESSMENT — PAIN SCALES - GENERAL
PAINLEVEL_OUTOF10: 10 - WORST POSSIBLE PAIN
PAINLEVEL_OUTOF10: 1

## 2025-08-12 ENCOUNTER — APPOINTMENT (OUTPATIENT)
Dept: NEUROLOGY | Facility: CLINIC | Age: 70
End: 2025-08-12
Payer: MEDICARE

## 2025-08-12 VITALS
BODY MASS INDEX: 25.02 KG/M2 | WEIGHT: 141.2 LBS | HEIGHT: 63 IN | TEMPERATURE: 97.3 F | DIASTOLIC BLOOD PRESSURE: 80 MMHG | HEART RATE: 75 BPM | SYSTOLIC BLOOD PRESSURE: 146 MMHG | RESPIRATION RATE: 18 BRPM

## 2025-08-12 DIAGNOSIS — I63.211 CEREBRAL INFARCTION DUE TO OCCLUSION OF RIGHT VERTEBRAL ARTERY (MULTI): Primary | ICD-10-CM

## 2025-08-12 DIAGNOSIS — I77.74: ICD-10-CM

## 2025-08-12 DIAGNOSIS — R55 SYNCOPE AND COLLAPSE: ICD-10-CM

## 2025-08-12 DIAGNOSIS — R51.9 CHRONIC DAILY HEADACHE: ICD-10-CM

## 2025-08-12 DIAGNOSIS — M48.02 CERVICAL STENOSIS OF SPINAL CANAL: ICD-10-CM

## 2025-08-12 DIAGNOSIS — R20.2 FACIAL PARESTHESIA: ICD-10-CM

## 2025-08-12 DIAGNOSIS — R42 DIZZINESS: ICD-10-CM

## 2025-08-12 DIAGNOSIS — R27.0 ATAXIA: ICD-10-CM

## 2025-08-12 DIAGNOSIS — I63.211 CEREBROVASCULAR ACCIDENT (CVA) DUE TO OCCLUSION OF RIGHT VERTEBRAL ARTERY (MULTI): ICD-10-CM

## 2025-08-12 PROCEDURE — 1160F RVW MEDS BY RX/DR IN RCRD: CPT | Performed by: PSYCHIATRY & NEUROLOGY

## 2025-08-12 PROCEDURE — 1159F MED LIST DOCD IN RCRD: CPT | Performed by: PSYCHIATRY & NEUROLOGY

## 2025-08-12 PROCEDURE — 1157F ADVNC CARE PLAN IN RCRD: CPT | Performed by: PSYCHIATRY & NEUROLOGY

## 2025-08-12 PROCEDURE — 3008F BODY MASS INDEX DOCD: CPT | Performed by: PSYCHIATRY & NEUROLOGY

## 2025-08-12 PROCEDURE — 99215 OFFICE O/P EST HI 40 MIN: CPT | Performed by: PSYCHIATRY & NEUROLOGY

## 2025-08-12 PROCEDURE — G2211 COMPLEX E/M VISIT ADD ON: HCPCS | Performed by: PSYCHIATRY & NEUROLOGY

## 2025-08-12 PROCEDURE — 1125F AMNT PAIN NOTED PAIN PRSNT: CPT | Performed by: PSYCHIATRY & NEUROLOGY

## 2025-08-12 RX ORDER — TOPIRAMATE 25 MG/1
25 TABLET, FILM COATED ORAL 2 TIMES DAILY
Qty: 60 TABLET | Refills: 11 | Status: SHIPPED | OUTPATIENT
Start: 2025-08-12 | End: 2026-08-12

## 2025-08-12 RX ORDER — TOPIRAMATE 25 MG/1
25 TABLET, FILM COATED ORAL 2 TIMES DAILY
Qty: 60 TABLET | Refills: 11 | Status: SHIPPED | OUTPATIENT
Start: 2025-08-12 | End: 2025-08-12 | Stop reason: SDUPTHER

## 2025-08-12 ASSESSMENT — PATIENT HEALTH QUESTIONNAIRE - PHQ9
1. LITTLE INTEREST OR PLEASURE IN DOING THINGS: NOT AT ALL
2. FEELING DOWN, DEPRESSED OR HOPELESS: NOT AT ALL
SUM OF ALL RESPONSES TO PHQ9 QUESTIONS 1 AND 2: 0

## 2025-08-12 ASSESSMENT — ENCOUNTER SYMPTOMS: HEADACHES: 1

## 2025-08-12 ASSESSMENT — PAIN SCALES - GENERAL: PAINLEVEL_OUTOF10: 5

## 2025-09-02 ENCOUNTER — TELEPHONE (OUTPATIENT)
Dept: NEUROLOGY | Facility: CLINIC | Age: 70
End: 2025-09-02
Payer: MEDICARE

## 2025-09-03 ENCOUNTER — OFFICE VISIT (OUTPATIENT)
Dept: PAIN MEDICINE | Facility: CLINIC | Age: 70
End: 2025-09-03
Payer: MEDICARE

## 2025-09-03 VITALS
HEART RATE: 68 BPM | BODY MASS INDEX: 24.98 KG/M2 | RESPIRATION RATE: 15 BRPM | WEIGHT: 141 LBS | DIASTOLIC BLOOD PRESSURE: 81 MMHG | TEMPERATURE: 97 F | SYSTOLIC BLOOD PRESSURE: 140 MMHG | OXYGEN SATURATION: 97 %

## 2025-09-03 DIAGNOSIS — M54.42 CHRONIC BILATERAL LOW BACK PAIN WITH BILATERAL SCIATICA: ICD-10-CM

## 2025-09-03 DIAGNOSIS — M54.41 CHRONIC BILATERAL LOW BACK PAIN WITH BILATERAL SCIATICA: ICD-10-CM

## 2025-09-03 DIAGNOSIS — G89.29 CHRONIC BILATERAL LOW BACK PAIN WITH BILATERAL SCIATICA: ICD-10-CM

## 2025-09-03 DIAGNOSIS — M54.16 LUMBAR NEURITIS: Primary | ICD-10-CM

## 2025-09-03 DIAGNOSIS — M51.26 LUMBAR DISC HERNIATION: ICD-10-CM

## 2025-09-03 DIAGNOSIS — M47.816 LUMBAR SPONDYLOSIS: ICD-10-CM

## 2025-09-03 PROCEDURE — 99212 OFFICE O/P EST SF 10 MIN: CPT

## 2025-09-03 ASSESSMENT — PAIN SCALES - GENERAL
PAINLEVEL_OUTOF10: 7
PAINLEVEL_OUTOF10: 7

## 2025-09-03 ASSESSMENT — PAIN DESCRIPTION - DESCRIPTORS: DESCRIPTORS: ACHING;DISCOMFORT

## 2025-09-03 ASSESSMENT — ENCOUNTER SYMPTOMS
OCCASIONAL FEELINGS OF UNSTEADINESS: 1
LOSS OF SENSATION IN FEET: 0

## 2025-09-03 ASSESSMENT — PAIN - FUNCTIONAL ASSESSMENT: PAIN_FUNCTIONAL_ASSESSMENT: 0-10

## 2025-09-04 DIAGNOSIS — R51.9 CHRONIC DAILY HEADACHE: ICD-10-CM

## 2025-09-04 RX ORDER — TOPIRAMATE 50 MG/1
50 TABLET, FILM COATED ORAL 2 TIMES DAILY
Qty: 60 TABLET | Refills: 0 | Status: SHIPPED | OUTPATIENT
Start: 2025-09-04 | End: 2025-10-04